# Patient Record
Sex: FEMALE | Race: WHITE | NOT HISPANIC OR LATINO | URBAN - METROPOLITAN AREA
[De-identification: names, ages, dates, MRNs, and addresses within clinical notes are randomized per-mention and may not be internally consistent; named-entity substitution may affect disease eponyms.]

---

## 2020-11-25 PROCEDURE — 86787 VARICELLA-ZOSTER ANTIBODY: CPT | Performed by: INTERNAL MEDICINE

## 2020-11-25 PROCEDURE — 86706 HEP B SURFACE ANTIBODY: CPT | Performed by: INTERNAL MEDICINE

## 2020-11-25 PROCEDURE — 86480 TB TEST CELL IMMUN MEASURE: CPT | Performed by: INTERNAL MEDICINE

## 2021-01-05 ENCOUNTER — IMMUNIZATION (OUTPATIENT)
Dept: IMMUNIZATION | Facility: CLINIC | Age: 31
End: 2021-01-05

## 2021-02-02 ENCOUNTER — IMMUNIZATION (OUTPATIENT)
Dept: IMMUNIZATION | Facility: CLINIC | Age: 31
End: 2021-02-02

## 2024-01-10 ENCOUNTER — APPOINTMENT (EMERGENCY)
Dept: RADIOLOGY | Facility: HOSPITAL | Age: 34
End: 2024-01-10
Attending: EMERGENCY MEDICINE
Payer: COMMERCIAL

## 2024-01-10 ENCOUNTER — HOSPITAL ENCOUNTER (OUTPATIENT)
Facility: HOSPITAL | Age: 34
Setting detail: OBSERVATION
Discharge: HOME | End: 2024-01-12
Attending: EMERGENCY MEDICINE | Admitting: COLON & RECTAL SURGERY
Payer: COMMERCIAL

## 2024-01-10 DIAGNOSIS — K81.9 ACALCULOUS CHOLECYSTITIS: Primary | ICD-10-CM

## 2024-01-10 DIAGNOSIS — K80.00 CHOLELITHIASIS AND ACUTE CHOLECYSTITIS WITHOUT OBSTRUCTION: ICD-10-CM

## 2024-01-10 LAB
ALBUMIN SERPL-MCNC: 3.6 G/DL (ref 3.5–5.7)
ALP SERPL-CCNC: 104 IU/L (ref 34–125)
ALT SERPL-CCNC: 94 IU/L (ref 7–52)
ANION GAP SERPL CALC-SCNC: 7 MEQ/L (ref 3–15)
AST SERPL-CCNC: 84 IU/L (ref 13–39)
B-HCG UR QL: NEGATIVE
BACTERIA URNS QL MICRO: ABNORMAL /HPF
BASOPHILS # BLD: 0.15 K/UL (ref 0.01–0.1)
BASOPHILS NFR BLD: 1.1 %
BILIRUB SERPL-MCNC: 0.2 MG/DL (ref 0.3–1.2)
BILIRUB UR QL STRIP.AUTO: NEGATIVE MG/DL
BUN SERPL-MCNC: 14 MG/DL (ref 7–25)
CALCIUM SERPL-MCNC: 8.6 MG/DL (ref 8.6–10.3)
CHLORIDE SERPL-SCNC: 109 MEQ/L (ref 98–107)
CLARITY UR REFRACT.AUTO: CLEAR
CO2 SERPL-SCNC: 26 MEQ/L (ref 21–31)
COLOR UR AUTO: COLORLESS
CREAT SERPL-MCNC: 0.6 MG/DL (ref 0.6–1.2)
DIFFERENTIAL METHOD BLD: ABNORMAL
EGFRCR SERPLBLD CKD-EPI 2021: >60 ML/MIN/1.73M*2
EOSINOPHIL # BLD: 0.38 K/UL (ref 0.04–0.36)
EOSINOPHIL NFR BLD: 2.8 %
ERYTHROCYTE [DISTWIDTH] IN BLOOD BY AUTOMATED COUNT: 13.6 % (ref 11.7–14.4)
GLUCOSE SERPL-MCNC: 82 MG/DL (ref 70–99)
GLUCOSE UR STRIP.AUTO-MCNC: NEGATIVE MG/DL
HCG UR QL: NEGATIVE
HCT VFR BLDCO AUTO: 34.5 % (ref 35–45)
HGB BLD-MCNC: 11.1 G/DL (ref 11.8–15.7)
HGB UR QL STRIP.AUTO: NEGATIVE
HYALINE CASTS #/AREA URNS LPF: ABNORMAL /LPF
IMM GRANULOCYTES # BLD AUTO: 0.03 K/UL (ref 0–0.08)
IMM GRANULOCYTES NFR BLD AUTO: 0.2 %
KETONES UR STRIP.AUTO-MCNC: NEGATIVE MG/DL
LEUKOCYTE ESTERASE UR QL STRIP.AUTO: 1
LIPASE SERPL-CCNC: 27 U/L (ref 11–82)
LYMPHOCYTES # BLD: 3.58 K/UL (ref 1.2–3.5)
LYMPHOCYTES NFR BLD: 26.7 %
MCH RBC QN AUTO: 30.2 PG (ref 28–33.2)
MCHC RBC AUTO-ENTMCNC: 32.2 G/DL (ref 32.2–35.5)
MCV RBC AUTO: 93.8 FL (ref 83–98)
MONOCYTES # BLD: 1.54 K/UL (ref 0.28–0.8)
MONOCYTES NFR BLD: 11.5 %
MUCOUS THREADS URNS QL MICRO: ABNORMAL /LPF
NEUTROPHILS # BLD: 7.72 K/UL (ref 1.7–7)
NEUTS SEG NFR BLD: 57.7 %
NITRITE UR QL STRIP.AUTO: NEGATIVE
NRBC BLD-RTO: 0 %
PDW BLD AUTO: 10.3 FL (ref 9.4–12.3)
PH UR STRIP.AUTO: 6 [PH]
PLATELET # BLD AUTO: 448 K/UL (ref 150–369)
POCT TEST: NORMAL
POTASSIUM SERPL-SCNC: 3.9 MEQ/L (ref 3.5–5.1)
PROT SERPL-MCNC: 6.5 G/DL (ref 6–8.2)
PROT UR QL STRIP.AUTO: NEGATIVE
RBC # BLD AUTO: 3.68 M/UL (ref 3.93–5.22)
RBC #/AREA URNS HPF: ABNORMAL /HPF
SODIUM SERPL-SCNC: 142 MEQ/L (ref 136–145)
SP GR UR REFRACT.AUTO: 1.01
SQUAMOUS URNS QL MICRO: ABNORMAL /HPF
UROBILINOGEN UR STRIP-ACNC: 0.2 EU/DL
WBC # BLD AUTO: 13.4 K/UL (ref 3.8–10.5)
WBC #/AREA URNS HPF: ABNORMAL /HPF

## 2024-01-10 PROCEDURE — 99285 EMERGENCY DEPT VISIT HI MDM: CPT | Mod: 25

## 2024-01-10 PROCEDURE — 96376 TX/PRO/DX INJ SAME DRUG ADON: CPT | Mod: 59

## 2024-01-10 PROCEDURE — 63600000 HC DRUGS/DETAIL CODE: Performed by: EMERGENCY MEDICINE

## 2024-01-10 PROCEDURE — 3E0337Z INTRODUCTION OF ELECTROLYTIC AND WATER BALANCE SUBSTANCE INTO PERIPHERAL VEIN, PERCUTANEOUS APPROACH: ICD-10-PCS | Performed by: EMERGENCY MEDICINE

## 2024-01-10 PROCEDURE — 81001 URINALYSIS AUTO W/SCOPE: CPT | Performed by: EMERGENCY MEDICINE

## 2024-01-10 PROCEDURE — 96374 THER/PROPH/DIAG INJ IV PUSH: CPT | Mod: 59

## 2024-01-10 PROCEDURE — 3E033GC INTRODUCTION OF OTHER THERAPEUTIC SUBSTANCE INTO PERIPHERAL VEIN, PERCUTANEOUS APPROACH: ICD-10-PCS | Performed by: EMERGENCY MEDICINE

## 2024-01-10 PROCEDURE — 80053 COMPREHEN METABOLIC PANEL: CPT | Performed by: EMERGENCY MEDICINE

## 2024-01-10 PROCEDURE — 25000000 HC PHARMACY GENERAL: Performed by: EMERGENCY MEDICINE

## 2024-01-10 PROCEDURE — 96367 TX/PROPH/DG ADDL SEQ IV INF: CPT | Mod: 59

## 2024-01-10 PROCEDURE — 83690 ASSAY OF LIPASE: CPT | Performed by: EMERGENCY MEDICINE

## 2024-01-10 PROCEDURE — 74177 CT ABD & PELVIS W/CONTRAST: CPT | Mod: MG

## 2024-01-10 PROCEDURE — 25500000 HC DRUGS/INCIDENT RAD: Performed by: EMERGENCY MEDICINE

## 2024-01-10 PROCEDURE — 85025 COMPLETE CBC W/AUTO DIFF WBC: CPT | Performed by: EMERGENCY MEDICINE

## 2024-01-10 PROCEDURE — 3E033NZ INTRODUCTION OF ANALGESICS, HYPNOTICS, SEDATIVES INTO PERIPHERAL VEIN, PERCUTANEOUS APPROACH: ICD-10-PCS | Performed by: EMERGENCY MEDICINE

## 2024-01-10 PROCEDURE — 96361 HYDRATE IV INFUSION ADD-ON: CPT | Mod: 59

## 2024-01-10 PROCEDURE — 76705 ECHO EXAM OF ABDOMEN: CPT

## 2024-01-10 PROCEDURE — 63600000 HC DRUGS/DETAIL CODE: Performed by: PHYSICIAN ASSISTANT

## 2024-01-10 PROCEDURE — 63600105 HC IODINE BASED CONTRAST: Mod: JZ | Performed by: EMERGENCY MEDICINE

## 2024-01-10 PROCEDURE — 84703 CHORIONIC GONADOTROPIN ASSAY: CPT | Performed by: EMERGENCY MEDICINE

## 2024-01-10 PROCEDURE — 36415 COLL VENOUS BLD VENIPUNCTURE: CPT | Performed by: EMERGENCY MEDICINE

## 2024-01-10 PROCEDURE — 3E03329 INTRODUCTION OF OTHER ANTI-INFECTIVE INTO PERIPHERAL VEIN, PERCUTANEOUS APPROACH: ICD-10-PCS | Performed by: EMERGENCY MEDICINE

## 2024-01-10 PROCEDURE — 96375 TX/PRO/DX INJ NEW DRUG ADDON: CPT | Mod: 59

## 2024-01-10 PROCEDURE — 96366 THER/PROPH/DIAG IV INF ADDON: CPT | Mod: 59

## 2024-01-10 PROCEDURE — A9698 NON-RAD CONTRAST MATERIALNOC: HCPCS | Performed by: EMERGENCY MEDICINE

## 2024-01-10 PROCEDURE — 3E0333Z INTRODUCTION OF ANTI-INFLAMMATORY INTO PERIPHERAL VEIN, PERCUTANEOUS APPROACH: ICD-10-PCS | Performed by: EMERGENCY MEDICINE

## 2024-01-10 PROCEDURE — 96365 THER/PROPH/DIAG IV INF INIT: CPT | Mod: 59

## 2024-01-10 RX ORDER — MORPHINE SULFATE 2 MG/ML
2 INJECTION, SOLUTION INTRAMUSCULAR; INTRAVENOUS ONCE
Status: COMPLETED | OUTPATIENT
Start: 2024-01-10 | End: 2024-01-10

## 2024-01-10 RX ORDER — FAMOTIDINE 10 MG/ML
20 INJECTION INTRAVENOUS ONCE
Status: COMPLETED | OUTPATIENT
Start: 2024-01-10 | End: 2024-01-10

## 2024-01-10 RX ORDER — KETOROLAC TROMETHAMINE 15 MG/ML
15 INJECTION, SOLUTION INTRAMUSCULAR; INTRAVENOUS ONCE
Status: COMPLETED | OUTPATIENT
Start: 2024-01-10 | End: 2024-01-10

## 2024-01-10 RX ORDER — IOPAMIDOL 755 MG/ML
100 INJECTION, SOLUTION INTRAVASCULAR
Status: COMPLETED | OUTPATIENT
Start: 2024-01-10 | End: 2024-01-10

## 2024-01-10 RX ADMIN — KETOROLAC TROMETHAMINE 15 MG: 15 INJECTION, SOLUTION INTRAMUSCULAR; INTRAVENOUS at 20:59

## 2024-01-10 RX ADMIN — IOPAMIDOL 100 ML: 755 INJECTION, SOLUTION INTRAVENOUS at 23:22

## 2024-01-10 RX ADMIN — FAMOTIDINE 20 MG: 10 INJECTION, SOLUTION INTRAVENOUS at 18:51

## 2024-01-10 RX ADMIN — MORPHINE SULFATE 2 MG: 2 INJECTION, SOLUTION INTRAMUSCULAR; INTRAVENOUS at 22:34

## 2024-01-10 RX ADMIN — IOHEXOL 1000 ML: 9 SOLUTION ORAL at 22:15

## 2024-01-10 NOTE — ED PROVIDER NOTES
"Emergency Medicine Note  HPI   HISTORY OF PRESENT ILLNESS     The patient complains of epigastric pain which started gradually last night.  It is a sharp pain that radiates to her back.  It is associated with nausea.  No vomiting.  She had a small bowel movement yesterday and again this morning.  After that, she had some diarrhea.  She went to urgent care and they \"did not do anything\".  She still feels like there is something wrong, however.  No fevers or chills.  She does not drink alcohol.  She has never had this pain before.  No history of peptic ulcers or gastritis.            Patient History   PAST HISTORY     Reviewed from Nursing Triage:  Meds       No past medical history on file.    No past surgical history on file.    No family history on file.           Review of Systems   REVIEW OF SYSTEMS     Review of Systems      VITALS     ED Vitals    Date/Time Temp Pulse Resp BP SpO2 [facility]   01/11/24 1210 -- -- -- 110/61 -- [facility]   01/11/24 1145 36.9 °C (98.5 °F) 76 14 131/67 98 % [facility]   01/11/24 0800 37.1 °C (98.7 °F) 77 22 125/76 98 % [facility]   01/11/24 0633 -- 73 -- 120/73 98 % BD   01/11/24 0345 -- 50 -- 113/71 97 % BD   01/11/24 0044 -- 50 -- 137/76 98 % [facility]   01/10/24 2345 -- 56 -- 121/70 99 % [facility]   01/10/24 2237 -- 61 -- -- 98 % [facility]   01/10/24 1900 -- 66 15 129/77 99 % [facility]   01/10/24 1742 -- 62 16 118/76 99 % AMP   01/10/24 1718 36.8 °C (98.3 °F) 62 16 126/74 100 % NN        Pulse Ox %: 98 % (01/10/24 2352)  Pulse Ox Interpretation: Normal (01/10/24 2352)  Heart Rate: 61 (01/10/24 2352)        Physical Exam   PHYSICAL EXAM     Physical Exam  Vitals and nursing note reviewed.   Constitutional:       Appearance: She is well-developed.   HENT:      Head: Normocephalic and atraumatic.   Eyes:      Conjunctiva/sclera: Conjunctivae normal.   Neck:      Vascular: No JVD.   Cardiovascular:      Rate and Rhythm: Normal rate and regular rhythm.   Pulmonary:      Effort: Pulmonary effort is normal. No respiratory distress. "      Breath sounds: Normal breath sounds.   Abdominal:      General: Bowel sounds are normal.      Palpations: Abdomen is soft.      Tenderness: There is abdominal tenderness (Epigastric). There is no guarding or rebound.   Musculoskeletal:         General: No deformity.      Cervical back: Neck supple.   Skin:     General: Skin is warm and dry.   Neurological:      General: No focal deficit present.      Mental Status: She is alert and oriented to person, place, and time.      Cranial Nerves: No cranial nerve deficit.   Psychiatric:         Thought Content: Thought content normal.         Judgment: Judgment normal.           PROCEDURES     Procedures     DATA     Results     Procedure Component Value Units Date/Time    Urinalysis with Reflex Culture [[id number]]  (Abnormal) Collected: 01/10/24 1949    Specimen: Urine, Clean Catch Updated: 01/10/24 2022    Narrative:      The following orders were created for panel order Urinalysis with Reflex Culture.  Procedure                               Abnormality         Status                     ---------                               -----------         ------                     UA Reflex to Culture (Ma...[[id number]]  Abnormal            Final result               UA Microscopic[[id number]]               Abnormal            Final result                 Please view results for these tests on the individual orders.    UA Microscopic [[id number]]  (Abnormal) Collected: 01/10/24 1949    Specimen: Urine, Clean Catch Updated: 01/10/24 2022     RBC, Urine 0 TO 4 /HPF      WBC, Urine 0 TO 3 /HPF      Squamous Epithelial Rare /hpf      Hyaline Cast None Seen /lpf      Bacteria, Urine None Seen /HPF      Mucus Rare /LPF     UA Reflex to Culture (Macroscopic) [[id number]]  (Abnormal) Collected: 01/10/24 1949    Specimen: Urine, Clean Catch Updated: 01/10/24 2020     Color, Urine Colorless     Clarity, Urine Clear     Specific Gravity, Urine 1.010     pH, Urine 6.0     Leukocyte  Esterase +1     Nitrite, Urine Negative     Protein, Urine Negative     Glucose, Urine Negative mg/dL      Ketones, Urine Negative mg/dL      Urobilinogen, Urine 0.2 EU/dL      Bilirubin, Urine Negative mg/dL      Blood, Urine Negative     Comment: The sensitivity of the occult blood test is equivalent to approximately 4 intact RBC/HPF.       Comprehensive metabolic panel [[id number]]  (Abnormal) Collected: 01/10/24 1742    Specimen: Blood, Venous Updated: 01/10/24 1844     Sodium 142 mEQ/L      Potassium 3.9 mEQ/L      Comment: Results obtained on plasma. Plasma Potassium values may be up to 0.4 mEQ/L less than serum values. The differences may be greater for patients with high platelet or white cell counts.        Chloride 109 mEQ/L      CO2 26 mEQ/L      BUN 14 mg/dL      Creatinine 0.6 mg/dL      Glucose 82 mg/dL      Calcium 8.6 mg/dL      AST (SGOT) 84 IU/L      ALT (SGPT) 94 IU/L      Alkaline Phosphatase 104 IU/L      Total Protein 6.5 g/dL      Comment: Test performed on plasma which typically contains approximately 0.4 g/dL more protein than serum.        Albumin 3.6 g/dL      Bilirubin, Total 0.2 mg/dL      eGFR >60.0 mL/min/1.73m*2      Comment: Calculation based on the Chronic Kidney Disease Epidemiology Collaboration (CKD-EPI) equation refit without adjustment for race.        Anion Gap 7 mEQ/L     Lipase, if pain is above umbilicus [[id number]]  (Normal) Collected: 01/10/24 1742    Specimen: Blood, Venous Updated: 01/10/24 1844     Lipase 27 U/L     BhCG, Serum, Qual [[id number]]  (Normal) Collected: 01/10/24 1742    Specimen: Blood, Venous Updated: 01/10/24 1808     Preg Test, Serum Negative    CBC and differential [[id number]]  (Abnormal) Collected: 01/10/24 1742    Specimen: Blood, Venous Updated: 01/10/24 1757     WBC 13.40 K/uL      RBC 3.68 M/uL      Hemoglobin 11.1 g/dL      Hematocrit 34.5 %      MCV 93.8 fL      MCH 30.2 pg      MCHC 32.2 g/dL      RDW 13.6 %      Platelets 448 K/uL      MPV 10.3  fL      Differential Type Auto     nRBC 0.0 %      Immature Granulocytes 0.2 %      Neutrophils 57.7 %      Lymphocytes 26.7 %      Monocytes 11.5 %      Eosinophils 2.8 %      Basophils 1.1 %      Immature Granulocytes, Absolute 0.03 K/uL      Neutrophils, Absolute 7.72 K/uL      Lymphocytes, Absolute 3.58 K/uL      Monocytes, Absolute 1.54 K/uL      Eosinophils, Absolute 0.38 K/uL      Basophils, Absolute 0.15 K/uL           Imaging Results          CT ABDOMEN PELVIS WITH IV CONTRAST (Edited Result - FINAL)  Result time 01/11/24 09:36:14    Addendum (preliminary) 1 of 1    Vipul ERVIN spoke with Dr. Ladonna Sen at 8:58am on 1/11/2024.               Final result                 Impression:    IMPRESSION:    1.  Redemonstrated edematous thickening of gallbladder wall and small amount of  pericholecystic fluid. Findings are concordant with prior sonogram and  compatible with acute acalculous cholecystitis based on patient's symptoms.  However, consider reactive gallbladder changes secondary to primary hepatic  pathology in the absence of cholelithiasis.  2.  Mild hepatomegaly. Periportal edema is nonspecific but could be compatible  with acute hepatitis or cholangitis.  3.  Status post splenectomy.  4.  Trace abdominal ascites. Moderate pelvic ascites.  5.  Very small right pleural effusion with mild bibasilar atelectasis.    Consider obtaining abdominal MRI/MRCP with and without contrast for further  evaluation of liver, gallbladder and biliary tree, as clinically indicated.             Narrative:    CLINICAL HISTORY:  34-year-old female with sharp, epigastric abdominal pain  radiating to back with nausea since last night.    COMPARISON:  Ultrasound examination of gallbladder dated 1/10/2024.    TECHNIQUE:  Axial CT images of the abdomen and pelvis are obtained following the  uneventful administration of intravenous contrast. Oral contrast is  administered. Coronal and sagittal images are reformatted. One or more  dose  reduction techniques (e.g., automated exposure control, adjustment of the mA  and/or kV according to the patient size, use of iterative reconstruction  technique) are utilized for this examination.    CONTRAST:  100 mL Isovue-370 IV contrast    COMMENT:    Lung Bases:  Very small, right-sided pleural effusion and trace pleural fluid in  left hemithorax with mild, passive or dependent atelectasis in posterior lower  lobes.    Liver:  Mildly enlarged. Mild periportal edema.    Gallbladder and Biliary Tree:  Edematous thickening of gallbladder wall and  small amount of pericholecystic fluid are concordant with sonographic findings.    Spleen:  Surgically absent.    Pancreas:  No suspicious findings.    Adrenal Glands:  No suspicious findings.    Kidneys:  Mild, right-sided pelvocaliectasis and mild right hydroureter, without  obstructive calculus identified. Small extrarenal pelves are noted bilaterally.    Pelvic Viscera:  No suspicious findings.    Bowel:  No bowel obstruction. Very small hiatal hernia. Portion of normal  caliber, gas containing appendix is seen in right lower quadrant.    Peritoneum, Retroperitoneum and Lymph Nodes:  No pneumoperitoneum. Moderate  volume of pelvic ascites. Very small volume of ascites in paracolic gutters.  Trace perihepatic ascites. No suspect mass or lymphadenopathy.    Osseous Structures:  No suspicious findings.    Additional Findings:  None.                    Preliminary Interpretation    Preliminary Impression:    Correlation was made with earlier ultrasound. Gallbladder wall edema and pericholecystic fluid concerning for acute cholecystitis. No calcified gallstones are seen. Status post splenectomy. No evidence for hydronephrosis. No evidence for bowel obstruction. Normal appendix. Moderate amount of stool in the colon. Moderate pelvic ascites. Bibasilar atelectasis. Small right pleural effusion.                                ULTRASOUND GALLBLADDER (Final result)   Result time 01/10/24 19:44:52    Final result                 Impression:    IMPRESSION:  Gallbladder wall thickening and pericholecystic fluid, concerning for acute  cholecystitis.               Narrative:      CLINICAL HISTORY:    Epigastric abdominal pain.    COMPARISON:   None    COMMENT:  Technique: Realtime grayscale and Color Doppler ultrasonography of the right  upper quadrant of the abdomen was performed.    Liver:              Normal in size, measuring 16.3 cm. There is normal liver  echogenicity. There is no mass. There is no intrahepatic or extrahepatic bile  duct dilatation. The common bile duct measures 2 mm, which is within normal  limits.  Gallbladder: There is gallbladder wall thickening, which measures up to 5 mm.  Additionally, there is a trace amount of pericholecystic fluid.  Pancreas:     The body appears normal. The head and tail are not well  visualized.  Right kidney: The right kidney measures 12.3 x 4.6 x 4.2 cm. There is normal  echogenicity. There is no hydronephrosis, calculus or mass.  Visualized upper abdominal aorta/IVC: Unremarkable                                ECG 12 lead          Scoring tools                                  ED Course & MDM   MDM / ED COURSE / CLINICAL IMPRESSION / DISPO     [facility]    ED Course as of 01/12/24 1004   Wed Ezra 10, 2024   2012 I spoke to the patient and her  about her results and plan of care.  I spoke to the surgery resident who will come to the ED to evaluate her. [HS]   3168 Preliminary Impression:    Correlation was made with earlier ultrasound. Gallbladder wall edema and pericholecystic fluid concerning for acute cholecystitis. No calcified gallstones are seen. Status post splenectomy. No evidence for hydronephrosis. No evidence for bowel obstruction. Normal appendix. Moderate amount of stool in the colon. Moderate pelvic ascites. Bibasilar atelectasis. Small right pleural effusion.    [KM]   3778 Paged surgery with CT results [KM]      ED  Course User Index  [HS] Cal Galan (Ed) MD Alexsander  [KM] Deneen Yo PA C     Clinical Impression      Acalculous cholecystitis     _________________     ED Disposition   Admit / Observation                   Cal Galan (Ed) MD Alexsander  01/12/24 1008

## 2024-01-11 ENCOUNTER — ANESTHESIA EVENT (INPATIENT)
Dept: OPERATING ROOM | Facility: HOSPITAL | Age: 34
End: 2024-01-11
Payer: COMMERCIAL

## 2024-01-11 ENCOUNTER — APPOINTMENT (INPATIENT)
Dept: RADIOLOGY | Facility: HOSPITAL | Age: 34
End: 2024-01-11
Attending: STUDENT IN AN ORGANIZED HEALTH CARE EDUCATION/TRAINING PROGRAM
Payer: COMMERCIAL

## 2024-01-11 PROBLEM — K80.00 CHOLELITHIASIS AND ACUTE CHOLECYSTITIS WITHOUT OBSTRUCTION: Status: ACTIVE | Noted: 2024-01-11

## 2024-01-11 LAB
ALBUMIN SERPL-MCNC: 3.3 G/DL (ref 3.5–5.7)
ALP SERPL-CCNC: 107 IU/L (ref 34–125)
ALT SERPL-CCNC: 127 IU/L (ref 7–52)
ANION GAP SERPL CALC-SCNC: 7 MEQ/L (ref 3–15)
AST SERPL-CCNC: 124 IU/L (ref 13–39)
BILIRUB DIRECT SERPL-MCNC: 0.1 MG/DL
BILIRUB SERPL-MCNC: 0.2 MG/DL (ref 0.3–1.2)
BUN SERPL-MCNC: 11 MG/DL (ref 7–25)
CALCIUM SERPL-MCNC: 8.3 MG/DL (ref 8.6–10.3)
CHLORIDE SERPL-SCNC: 107 MEQ/L (ref 98–107)
CO2 SERPL-SCNC: 25 MEQ/L (ref 21–31)
CREAT SERPL-MCNC: 0.7 MG/DL (ref 0.6–1.2)
EGFRCR SERPLBLD CKD-EPI 2021: >60 ML/MIN/1.73M*2
ERYTHROCYTE [DISTWIDTH] IN BLOOD BY AUTOMATED COUNT: 13.7 % (ref 11.7–14.4)
GLUCOSE SERPL-MCNC: 111 MG/DL (ref 70–99)
HCT VFR BLDCO AUTO: 31.2 % (ref 35–45)
HGB BLD-MCNC: 10.1 G/DL (ref 11.8–15.7)
MAGNESIUM SERPL-MCNC: 1.8 MG/DL (ref 1.8–2.5)
MCH RBC QN AUTO: 30.1 PG (ref 28–33.2)
MCHC RBC AUTO-ENTMCNC: 32.4 G/DL (ref 32.2–35.5)
MCV RBC AUTO: 93.1 FL (ref 83–98)
PDW BLD AUTO: 11.4 FL (ref 9.4–12.3)
PHOSPHATE SERPL-MCNC: 4.3 MG/DL (ref 2.4–4.7)
PLATELET # BLD AUTO: 386 K/UL (ref 150–369)
POTASSIUM SERPL-SCNC: 3.4 MEQ/L (ref 3.5–5.1)
PROT SERPL-MCNC: 5.8 G/DL (ref 6–8.2)
RBC # BLD AUTO: 3.35 M/UL (ref 3.93–5.22)
SODIUM SERPL-SCNC: 139 MEQ/L (ref 136–145)
WBC # BLD AUTO: 13.63 K/UL (ref 3.8–10.5)

## 2024-01-11 PROCEDURE — 63700000 HC SELF-ADMINISTRABLE DRUG

## 2024-01-11 PROCEDURE — 63700000 HC SELF-ADMINISTRABLE DRUG: Performed by: STUDENT IN AN ORGANIZED HEALTH CARE EDUCATION/TRAINING PROGRAM

## 2024-01-11 PROCEDURE — 25800000 HC PHARMACY IV SOLUTIONS: Performed by: STUDENT IN AN ORGANIZED HEALTH CARE EDUCATION/TRAINING PROGRAM

## 2024-01-11 PROCEDURE — 93005 ELECTROCARDIOGRAM TRACING: CPT

## 2024-01-11 PROCEDURE — 21400000 HC ROOM AND CARE CCU/INTERMEDIATE

## 2024-01-11 PROCEDURE — 83735 ASSAY OF MAGNESIUM: CPT

## 2024-01-11 PROCEDURE — 80048 BASIC METABOLIC PNL TOTAL CA: CPT

## 2024-01-11 PROCEDURE — 63600000 HC DRUGS/DETAIL CODE: Mod: JZ | Performed by: STUDENT IN AN ORGANIZED HEALTH CARE EDUCATION/TRAINING PROGRAM

## 2024-01-11 PROCEDURE — 36415 COLL VENOUS BLD VENIPUNCTURE: CPT

## 2024-01-11 PROCEDURE — 25000000 HC PHARMACY GENERAL

## 2024-01-11 PROCEDURE — G0378 HOSPITAL OBSERVATION PER HR: HCPCS

## 2024-01-11 PROCEDURE — 80076 HEPATIC FUNCTION PANEL: CPT

## 2024-01-11 PROCEDURE — 25000000 HC PHARMACY GENERAL: Performed by: STUDENT IN AN ORGANIZED HEALTH CARE EDUCATION/TRAINING PROGRAM

## 2024-01-11 PROCEDURE — 85027 COMPLETE CBC AUTOMATED: CPT

## 2024-01-11 PROCEDURE — 99223 1ST HOSP IP/OBS HIGH 75: CPT | Performed by: COLON & RECTAL SURGERY

## 2024-01-11 PROCEDURE — 71045 X-RAY EXAM CHEST 1 VIEW: CPT

## 2024-01-11 PROCEDURE — 63600000 HC DRUGS/DETAIL CODE: Mod: JZ,JG

## 2024-01-11 PROCEDURE — 84100 ASSAY OF PHOSPHORUS: CPT

## 2024-01-11 PROCEDURE — 63600000 HC DRUGS/DETAIL CODE: Performed by: PHARMACIST

## 2024-01-11 PROCEDURE — 25800000 HC PHARMACY IV SOLUTIONS

## 2024-01-11 RX ORDER — DEXTROSE MONOHYDRATE, SODIUM CHLORIDE, AND POTASSIUM CHLORIDE 50; 1.49; 4.5 G/1000ML; G/1000ML; G/1000ML
INJECTION, SOLUTION INTRAVENOUS CONTINUOUS
Status: DISCONTINUED | OUTPATIENT
Start: 2024-01-11 | End: 2024-01-12

## 2024-01-11 RX ORDER — SUMATRIPTAN 6 MG/.5ML
6 INJECTION, SOLUTION SUBCUTANEOUS ONCE
Status: COMPLETED | OUTPATIENT
Start: 2024-01-11 | End: 2024-01-11

## 2024-01-11 RX ORDER — DOCUSATE SODIUM 100 MG/1
100 CAPSULE, LIQUID FILLED ORAL 2 TIMES DAILY PRN
Status: DISCONTINUED | OUTPATIENT
Start: 2024-01-11 | End: 2024-01-12 | Stop reason: HOSPADM

## 2024-01-11 RX ORDER — DIPHENHYDRAMINE HCL 50 MG/ML
50 VIAL (ML) INJECTION ONCE
Status: COMPLETED | OUTPATIENT
Start: 2024-01-11 | End: 2024-01-11

## 2024-01-11 RX ORDER — IBUPROFEN 200 MG
16-32 TABLET ORAL AS NEEDED
Status: DISCONTINUED | OUTPATIENT
Start: 2024-01-11 | End: 2024-01-12 | Stop reason: HOSPADM

## 2024-01-11 RX ORDER — CYANOCOBALAMIN (VITAMIN B-12) 500 MCG
1 TABLET ORAL NIGHTLY
Status: COMPLETED | OUTPATIENT
Start: 2024-01-11 | End: 2024-01-11

## 2024-01-11 RX ORDER — OXYCODONE HYDROCHLORIDE 5 MG/1
5 TABLET ORAL EVERY 6 HOURS PRN
Status: DISCONTINUED | OUTPATIENT
Start: 2024-01-11 | End: 2024-01-12 | Stop reason: HOSPADM

## 2024-01-11 RX ORDER — METOCLOPRAMIDE HYDROCHLORIDE 5 MG/ML
5 INJECTION INTRAMUSCULAR; INTRAVENOUS EVERY 6 HOURS PRN
Status: DISCONTINUED | OUTPATIENT
Start: 2024-01-11 | End: 2024-01-11

## 2024-01-11 RX ORDER — ONDANSETRON HYDROCHLORIDE 2 MG/ML
4 INJECTION, SOLUTION INTRAVENOUS EVERY 6 HOURS PRN
Status: DISCONTINUED | OUTPATIENT
Start: 2024-01-11 | End: 2024-01-11

## 2024-01-11 RX ORDER — KETOROLAC TROMETHAMINE 15 MG/ML
15 INJECTION, SOLUTION INTRAMUSCULAR; INTRAVENOUS EVERY 6 HOURS PRN
Status: DISCONTINUED | OUTPATIENT
Start: 2024-01-11 | End: 2024-01-12 | Stop reason: HOSPADM

## 2024-01-11 RX ORDER — LANOLIN ALCOHOL/MO/W.PET/CERES
400 CREAM (GRAM) TOPICAL ONCE
Status: DISCONTINUED | OUTPATIENT
Start: 2024-01-11 | End: 2024-01-11

## 2024-01-11 RX ORDER — SENNOSIDES 8.6 MG/1
1 TABLET ORAL NIGHTLY
Status: DISCONTINUED | OUTPATIENT
Start: 2024-01-11 | End: 2024-01-12 | Stop reason: HOSPADM

## 2024-01-11 RX ORDER — ACETAMINOPHEN 325 MG/1
975 TABLET ORAL EVERY 6 HOURS PRN
Status: DISCONTINUED | OUTPATIENT
Start: 2024-01-11 | End: 2024-01-12 | Stop reason: HOSPADM

## 2024-01-11 RX ORDER — DEXTROSE 50 % IN WATER (D50W) INTRAVENOUS SYRINGE
25 AS NEEDED
Status: DISCONTINUED | OUTPATIENT
Start: 2024-01-11 | End: 2024-01-12 | Stop reason: HOSPADM

## 2024-01-11 RX ORDER — DEXTROSE 40 %
15-30 GEL (GRAM) ORAL AS NEEDED
Status: DISCONTINUED | OUTPATIENT
Start: 2024-01-11 | End: 2024-01-12 | Stop reason: HOSPADM

## 2024-01-11 RX ORDER — HEPARIN SODIUM 5000 [USP'U]/ML
5000 INJECTION, SOLUTION INTRAVENOUS; SUBCUTANEOUS EVERY 8 HOURS
Status: DISCONTINUED | OUTPATIENT
Start: 2024-01-12 | End: 2024-01-11

## 2024-01-11 RX ORDER — POTASSIUM CHLORIDE 14.9 MG/ML
20 INJECTION INTRAVENOUS ONCE
Status: DISCONTINUED | OUTPATIENT
Start: 2024-01-11 | End: 2024-01-11

## 2024-01-11 RX ORDER — CLONAZEPAM 0.5 MG/1
0.5 TABLET ORAL DAILY PRN
COMMUNITY

## 2024-01-11 RX ORDER — KETOROLAC TROMETHAMINE 30 MG/ML
30 INJECTION, SOLUTION INTRAMUSCULAR; INTRAVENOUS ONCE
Status: COMPLETED | OUTPATIENT
Start: 2024-01-11 | End: 2024-01-11

## 2024-01-11 RX ORDER — IBUPROFEN 400 MG/1
800 TABLET ORAL EVERY 6 HOURS PRN
Status: DISCONTINUED | OUTPATIENT
Start: 2024-01-11 | End: 2024-01-11

## 2024-01-11 RX ORDER — GUAIFENESIN 600 MG/1
600 TABLET, EXTENDED RELEASE ORAL 2 TIMES DAILY
Status: DISCONTINUED | OUTPATIENT
Start: 2024-01-11 | End: 2024-01-12

## 2024-01-11 RX ORDER — DEXTROSE MONOHYDRATE, SODIUM CHLORIDE, AND POTASSIUM CHLORIDE 50; 1.49; 4.5 G/1000ML; G/1000ML; G/1000ML
INJECTION, SOLUTION INTRAVENOUS CONTINUOUS
Status: DISCONTINUED | OUTPATIENT
Start: 2024-01-11 | End: 2024-01-11

## 2024-01-11 RX ORDER — HEPARIN SODIUM 5000 [USP'U]/ML
5000 INJECTION, SOLUTION INTRAVENOUS; SUBCUTANEOUS EVERY 8 HOURS
Status: DISCONTINUED | OUTPATIENT
Start: 2024-01-12 | End: 2024-01-12

## 2024-01-11 RX ORDER — MORPHINE SULFATE 2 MG/ML
2 INJECTION, SOLUTION INTRAMUSCULAR; INTRAVENOUS ONCE
Status: DISCONTINUED | OUTPATIENT
Start: 2024-01-11 | End: 2024-01-11

## 2024-01-11 RX ORDER — PANTOPRAZOLE SODIUM 40 MG/10ML
40 INJECTION, POWDER, LYOPHILIZED, FOR SOLUTION INTRAVENOUS EVERY 24 HOURS
Status: DISCONTINUED | OUTPATIENT
Start: 2024-01-11 | End: 2024-01-12 | Stop reason: HOSPADM

## 2024-01-11 RX ORDER — METRONIDAZOLE 500 MG/100ML
500 INJECTION, SOLUTION INTRAVENOUS
Status: DISCONTINUED | OUTPATIENT
Start: 2024-01-11 | End: 2024-01-12

## 2024-01-11 RX ORDER — HYDROMORPHONE HYDROCHLORIDE 1 MG/ML
0.5 INJECTION, SOLUTION INTRAMUSCULAR; INTRAVENOUS; SUBCUTANEOUS EVERY 4 HOURS PRN
Status: DISCONTINUED | OUTPATIENT
Start: 2024-01-11 | End: 2024-01-11

## 2024-01-11 RX ORDER — CLONAZEPAM 0.5 MG/1
0.5 TABLET ORAL NIGHTLY PRN
Status: DISCONTINUED | OUTPATIENT
Start: 2024-01-11 | End: 2024-01-12 | Stop reason: HOSPADM

## 2024-01-11 RX ADMIN — POTASSIUM CHLORIDE, DEXTROSE MONOHYDRATE AND SODIUM CHLORIDE: 150; 5; 450 INJECTION, SOLUTION INTRAVENOUS at 18:04

## 2024-01-11 RX ADMIN — METRONIDAZOLE 500 MG: 5 INJECTION, SOLUTION INTRAVENOUS at 08:20

## 2024-01-11 RX ADMIN — POTASSIUM CHLORIDE, DEXTROSE MONOHYDRATE AND SODIUM CHLORIDE: 150; 5; 450 INJECTION, SOLUTION INTRAVENOUS at 10:04

## 2024-01-11 RX ADMIN — CLONAZEPAM 0.5 MG: 0.5 TABLET ORAL at 20:45

## 2024-01-11 RX ADMIN — METRONIDAZOLE 500 MG: 5 INJECTION, SOLUTION INTRAVENOUS at 17:07

## 2024-01-11 RX ADMIN — OXYCODONE HYDROCHLORIDE 5 MG: 5 TABLET ORAL at 08:17

## 2024-01-11 RX ADMIN — HYDROMORPHONE HYDROCHLORIDE 0.5 MG: 1 INJECTION, SOLUTION INTRAMUSCULAR; INTRAVENOUS; SUBCUTANEOUS at 10:04

## 2024-01-11 RX ADMIN — POTASSIUM CHLORIDE, DEXTROSE MONOHYDRATE AND SODIUM CHLORIDE: 150; 5; 450 INJECTION, SOLUTION INTRAVENOUS at 03:43

## 2024-01-11 RX ADMIN — DOCUSATE SODIUM 100 MG: 100 CAPSULE, LIQUID FILLED ORAL at 12:59

## 2024-01-11 RX ADMIN — HYDROMORPHONE HYDROCHLORIDE 0.5 MG: 1 INJECTION, SOLUTION INTRAMUSCULAR; INTRAVENOUS; SUBCUTANEOUS at 06:31

## 2024-01-11 RX ADMIN — PANTOPRAZOLE SODIUM 40 MG: 40 INJECTION, POWDER, LYOPHILIZED, FOR SOLUTION INTRAVENOUS at 08:17

## 2024-01-11 RX ADMIN — OXYCODONE HYDROCHLORIDE 5 MG: 5 TABLET ORAL at 20:45

## 2024-01-11 RX ADMIN — KETOROLAC TROMETHAMINE 30 MG: 30 INJECTION, SOLUTION INTRAMUSCULAR at 16:10

## 2024-01-11 RX ADMIN — SENNOSIDES 1 TABLET: 8.6 TABLET, FILM COATED ORAL at 20:45

## 2024-01-11 RX ADMIN — Medication 1 MG: at 03:41

## 2024-01-11 RX ADMIN — ONDANSETRON 4 MG: 2 INJECTION INTRAMUSCULAR; INTRAVENOUS at 01:32

## 2024-01-11 RX ADMIN — MORPHINE SULFATE 2 MG: 2 INJECTION, SOLUTION INTRAMUSCULAR; INTRAVENOUS at 02:27

## 2024-01-11 RX ADMIN — METRONIDAZOLE 500 MG: 5 INJECTION, SOLUTION INTRAVENOUS at 01:34

## 2024-01-11 RX ADMIN — CEFTRIAXONE SODIUM 1 G: 1 INJECTION, POWDER, FOR SOLUTION INTRAMUSCULAR; INTRAVENOUS at 00:41

## 2024-01-11 RX ADMIN — SUMATRIPTAN 6 MG: 6 INJECTION, SOLUTION SUBCUTANEOUS at 16:10

## 2024-01-11 RX ADMIN — MAGNESIUM SULFATE HEPTAHYDRATE 2 G: 40 INJECTION, SOLUTION INTRAVENOUS at 14:20

## 2024-01-11 RX ADMIN — ONDANSETRON 4 MG: 2 INJECTION INTRAMUSCULAR; INTRAVENOUS at 11:35

## 2024-01-11 RX ADMIN — DIPHENHYDRAMINE HYDROCHLORIDE 50 MG: 50 INJECTION INTRAMUSCULAR; INTRAVENOUS at 14:09

## 2024-01-11 ASSESSMENT — COGNITIVE AND FUNCTIONAL STATUS - GENERAL
CLIMB 3 TO 5 STEPS WITH RAILING: 4 - NONE
STANDING UP FROM CHAIR USING ARMS: 4 - NONE
MOVING TO AND FROM BED TO CHAIR: 4 - NONE
MOVING TO AND FROM BED TO CHAIR: 4 - NONE
CLIMB 3 TO 5 STEPS WITH RAILING: 4 - NONE
STANDING UP FROM CHAIR USING ARMS: 4 - NONE
CLIMB 3 TO 5 STEPS WITH RAILING: 4 - NONE
STANDING UP FROM CHAIR USING ARMS: 4 - NONE
WALKING IN HOSPITAL ROOM: 4 - NONE
MOVING TO AND FROM BED TO CHAIR: 4 - NONE

## 2024-01-11 NOTE — PLAN OF CARE
Care Coordination Admission Assessment Note    General Information:  Readmission Within the last 30 days: no previous admission in last 30 days  Does patient have a :    Patient-Specific Goals (include timeframe): to have the surgery    Living Arrangements:  Arrived From: home  Current Living Arrangements: home  People in Home: spouse  Home Accessibility:    Living Arrangement Comments: lives w/ spouse in 2 , 5 [place]    Housing Stability and Utility Access (SDOH):  In the last 12 months, was there a time when you were not able to pay the mortgage or rent on time?: No  In the last 12 months, how many places have you lived?: 1  In the last 12 months, was there a time when you did not have a steady place to sleep or slept in a shelter (including now)?: No  In the past 12 months has the electric, gas, oil, or water company threatened to shut off services in your home?: No    Functional Status Prior to Admission:   Assistive Device/Animal Currently Used at Home: none  Functional Status Comments:    IADL Comments:       Supports and Services:  Current Outpatient/Agency/Support Group: none  Type of Current Home Care Services:    History of home care episode or rehab stay:      Discharge Needs Assessment:   Concerns to be Addressed: no discharge needs identified  Current Discharge Risk:    Anticipated Changes Related to Illness: none    Patient/Family Anticipated Discharge Plan:  Patient/Family Anticipates Transition To: home with family  Patient/Family Anticipated Services at Transition: none    Connection to Community  Not applicable      Patient Choice:   Offered/Gave Vendor List: no  Patient's Choice of Community Agency(s):         Anticipated Discharge Plan:     Anticipated Discharge Disposition: home without assistance or services     Transportation Needs (SDOH):  Transportation Concerns:    Transportation Anticipated: family or friend will provide  Is Out of Hospital DNR needed at discharge?:  no    In the past 12 months, has lack of transportation kept you from medical appointments or from getting medications?: No  In the past 12 months, has lack of transportation kept you from meetings, work, or from getting things needed for daily living?: No    Concerns - comments:  RNCC met w/ pt at bedside to complete CMA    Confirmed PCP, preferred pharmacy, address, emergency contacts    Vaccine status: rec'd Moderna covid vaccine plus 1 booster; did not get flu shot    LW/POA: none    Adm w/ acute cholecystitis  Plan : NPO/ IVF, for OR for lap brandi 1/12    No dc needs identified  CC will continue to follow until discharged

## 2024-01-11 NOTE — H&P
General Surgery History and Physical ([facility] [id number])         HPI     Patient is a 34 y.o. female, with previous medical history of ITP s/p laparoscopic splenectomy (2010), urinary stent, who presents with abdominal discomfort, distention.  Patient's  at bedside.  Patient reports that yesterday she started having discomfort in her upper abdomen.  Describes it as soreness, not as severe as pain.  She was also feeling that area.  Later on her soreness started radiating to her back.  Endorses nausea.  Denies vomiting, fever, chills.    In the ED she is afebrile hemodynamically stable  Labs are significant for leukocytosis white count 13.4, BMP within normal limits.  LFTs mildly elevated (AST 84, ALT 94, , T. bili 0.2, lipase 27).  Ultrasound showing gallbladder wall thickening 4.4-5mm, pericholecystic fluid. CBD is 1.8 mm  CT abdomen pelvis was done, showing gallbladder wall edema and pericholecystic fluid concerning for acute cholecystitis      Medical History: No past medical history on file.    Surgical History: No past surgical history on file.    Social History:   Social History     Socioeconomic History   • Marital status:      Social Determinants of Health     Food Insecurity: No Food Insecurity (1/10/2024)    Hunger Vital Sign    • Worried About Running Out of Food in the Last Year: Never true    • Ran Out of Food in the Last Year: Never true       Family History: No family history on file.    Allergies: Patient has no known allergies.    Home Medications:  Not in a hospital admission.    Current Medications:  •  acetaminophen, 975 mg, oral, q6h PRN  •  cefTRIAXone, 1 g, intravenous, q24h INT  •  glucose, 16-32 g of dextrose, oral, PRN **OR** dextrose, 15-30 g of dextrose, oral, PRN **OR** glucagon, 1 mg, intramuscular, PRN **OR** dextrose 50 % in water (D50), 25 mL, intravenous, PRN  •  potassium chloride-D5-0.45%NaCl, , intravenous, Continuous  •  [START ON 1/12/2024] heparin (porcine),  5,000 Units, subcutaneous, q8h NIKOLE  •  metroNIDAZOLE, 500 mg, intravenous, q8h INT  •  ondansetron, 4 mg, intravenous, q6h PRN  •  oxyCODONE, 5 mg, oral, q6h PRN  •  pantoprazole, 40 mg, intravenous, q24h    Review of Systems  Pertinent items are noted in HPI.    Objective     Vital Signs for the last 24 hours:  Temp:  [36.8 °C (98.3 °F)] 36.8 °C (98.3 °F)  Heart Rate:  [56-66] 56  Resp:  [15-16] 15  BP: (118-129)/(70-77) 121/70    Physicial Exam    General appearance: alert, appears stated age and cooperative  Head: normocephalic, without obvious abnormality, atraumatic  Eyes: PERRL, EOMI  Neck: no JVD and supple, symmetrical, trachea midline  Lungs: Non copious breathing, symmetrical chest expansion  Heart: RRR  Abdomen: Soft, diffusely tender, mostly epigastrium and right upper quadrant, upon palpation of lower abdomen patient endorses tenderness radiating to her right upper quadrant, no guarding, no rigidity, not peritonitic, negative Cisneros  Extremities: extremities normal, warm and well-perfused; no cyanosis, clubbing, or edema  Skin: Skin color, texture, turgor normal. No rashes or lesions  Neurologic: Grossly normal        Labs   Latest Reference Range & Units 01/10/24 17:42   WBC 3.80 - 10.50 K/uL 13.40 (H)   RBC 3.93 - 5.22 M/uL 3.68 (L)   Hemoglobin 11.8 - 15.7 g/dL 11.1 (L)   Hematocrit 35.0 - 45.0 % 34.5 (L)   MCV 83.0 - 98.0 fL 93.8   MCH 28.0 - 33.2 pg 30.2   MCHC 32.2 - 35.5 g/dL 32.2   RDW 11.7 - 14.4 % 13.6   Platelets 150 - 369 K/uL 448 (H)   MPV 9.4 - 12.3 fL 10.3   (H): Data is abnormally high  (L): Data is abnormally low     Latest Reference Range & Units 01/10/24 17:42   Sodium 136 - 145 mEQ/L 142   Potassium, Bld 3.5 - 5.1 mEQ/L 3.9   Chloride 98 - 107 mEQ/L 109 (H)   CO2 21 - 31 mEQ/L 26   BUN 7 - 25 mg/dL 14   Creatinine 0.6 - 1.2 mg/dL 0.6   Glucose 70 - 99 mg/dL 82   Calcium 8.6 - 10.3 mg/dL 8.6   AST (SGOT) 13 - 39 IU/L 84 (H)   ALT (SGPT) 7 - 52 IU/L 94 (H)   Alkaline Phosphatase 34 -  125 IU/L 104   Total Protein 6.0 - 8.2 g/dL 6.5   Albumin 3.5 - 5.7 g/dL 3.6   Bilirubin, Total 0.3 - 1.2 mg/dL 0.2 (L)   eGFR >=60.0 mL/min/1.73m*2 >60.0   Anion Gap 3 - 15 mEQ/L 7   Lipase 11 - 82 U/L 27   (H): Data is abnormally high  (L): Data is abnormally low    Imaging  U/s Gallbladder 1/10/2024    Narrative & Impression      CLINICAL HISTORY:    Epigastric abdominal pain.     COMPARISON:   None     COMMENT:  Technique: Realtime grayscale and Color Doppler ultrasonography of the right  upper quadrant of the abdomen was performed.     Liver:              Normal in size, measuring 16.3 cm. There is normal liver  echogenicity. There is no mass. There is no intrahepatic or extrahepatic bile  duct dilatation. The common bile duct measures 2 mm, which is within normal  limits.  Gallbladder: There is gallbladder wall thickening, which measures up to 5 mm.  Additionally, there is a trace amount of pericholecystic fluid.  Pancreas:     The body appears normal. The head and tail are not well  visualized.  Right kidney: The right kidney measures 12.3 x 4.6 x 4.2 cm. There is normal  echogenicity. There is no hydronephrosis, calculus or mass.  Visualized upper abdominal aorta/IVC: Unremarkable     --  IMPRESSION:  Gallbladder wall thickening and pericholecystic fluid, concerning for acute  cholecystitis.         CT A/P 1/10/2024    Deneen Yo PA C on 1/10/2024 11:50 PM ([facility], Emergency Medicine)   Preliminary Impression:     Correlation was made with earlier ultrasound. Gallbladder wall edema and pericholecystic fluid concerning for acute cholecystitis. No calcified gallstones are seen. Status post splenectomy. No evidence for hydronephrosis. No evidence for bowel obstruction. Normal appendix. Moderate amount of stool in the colon. Moderate pelvic ascites. Bibasilar atelectasis. Small right pleural effusion.            Assessment/Plan     34 y.o. F, with PMH of ITP s/p  laparoscopic splenectomy (2010), urinary stent, who presents with abdominal soreness and nausea. ,    Nontoxic-appearing, afebrile, hemodynamically stable. Labs significant for leukocytosis 13.4, BMP within normal limits.  LFTs mildly elevated (AST 84, ALT 94, , T. bili 0.2, lipase 27). Ultrasound showing gallbladder wall thickening 4.4-5mm, pericholecystic fluid. CBD is 1.8 mm, consistent with acute cholecystitis    -Admit to general surgery  -NPO/IVF  -Ceftriaxone, Flagyl  -Pain meds  -Plan for lap brandi on Friday 1/12    Code Status: Full Code    Discussed with Dr. Tu Wall MD  PGY-3 General Surgery Resident   Please page [phone] with any questions or concerns

## 2024-01-11 NOTE — PRE-PROCEDURE INSTRUCTIONS
Surgery Pre-operative Note    Pre-op diagnosis Pre-op Diagnosis     * Cholelithiasis and acute cholecystitis without obstruction [K80.00]    Procedure:  CHOLECYSTECTOMY LAPAROSCOPIC    Schedule:  1/11/24   Code status: Full Code   Labs Results from last 7 days   Lab Units 01/11/24  0357 01/10/24  1742   WBC K/uL 13.63* 13.40*   HEMOGLOBIN g/dL 10.1* 11.1*   HEMATOCRIT % 31.2* 34.5*   PLATELETS K/uL 386* 448*     Results from last 7 days   Lab Units 01/11/24  0357 01/10/24  1742   SODIUM mEQ/L 139 142   POTASSIUM mEQ/L 3.4* 3.9   CHLORIDE mEQ/L 107 109*   CO2 mEQ/L 25 26   BUN mg/dL 11 14   CREATININE mg/dL 0.7 0.6   GLUCOSE mg/dL 111* 82   CALCIUM mg/dL 8.3* 8.6   MAGNESIUM mg/dL 1.8  --    PHOSPHORUS mg/dL 4.3  --         Coagulation studies  Anticoagulation  Antiplatelets     PT/INR ordered    Blood Type and screen ordered   CXR/Imaging:  XR CHEST 1 VW 01/11/2024    Narrative  CLINICAL HISTORY: OR tomorrow.    AP portable view of the chest.    COMPARISON: CT abdomen/pelvis on 1024    COMMENT:    The heart is normal in size.  The cardiomediastinal silhouette is unremarkable.  The lungs are clear. The trace bilateral pleural effusions seen on the earlier  CT are not visible radiographically. The visualized soft tissue and osseous  structures are unremarkable.    Impression  IMPRESSION:    No active disease.     Diet NPO after midnight   Consent Obtained, in OR preop   Allergies NKDA   Periop abx Ancef on call to OR as needed      Please page [phone] with any questions or concerns.  LETICIA Arroyo

## 2024-01-11 NOTE — PROGRESS NOTES
Spoke with patient and confirmed with medication list from [facility] and/or patient's own pharmacy to complete the medication reconciliation.     Prior to admission medication list:  Current Outpatient Medications:   •  clonazePAM (klonoPIN) 0.5 mg tablet, Take 0.5 mg by mouth daily as needed for anxiety.    Comments about home medications:  -Patient reports she weaned herself off Prozac last week.    Compliance:   -Recent fills on clonazepam.

## 2024-01-12 ENCOUNTER — ANESTHESIA (INPATIENT)
Dept: OPERATING ROOM | Facility: HOSPITAL | Age: 34
End: 2024-01-12
Payer: COMMERCIAL

## 2024-01-12 VITALS
DIASTOLIC BLOOD PRESSURE: 69 MMHG | OXYGEN SATURATION: 99 % | RESPIRATION RATE: 18 BRPM | HEIGHT: 60 IN | BODY MASS INDEX: 23.36 KG/M2 | WEIGHT: 119 LBS | HEART RATE: 70 BPM | SYSTOLIC BLOOD PRESSURE: 115 MMHG | TEMPERATURE: 97.4 F

## 2024-01-12 LAB
ABO + RH BLD: NORMAL
ALBUMIN SERPL-MCNC: 3.1 G/DL (ref 3.5–5.7)
ALP SERPL-CCNC: 92 IU/L (ref 34–125)
ALT SERPL-CCNC: 88 IU/L (ref 7–52)
ANION GAP SERPL CALC-SCNC: 5 MEQ/L (ref 3–15)
APTT PPP: 24 SEC (ref 23–35)
AST SERPL-CCNC: 57 IU/L (ref 13–39)
ATRIAL RATE: 49
BILIRUB DIRECT SERPL-MCNC: 0.1 MG/DL
BILIRUB SERPL-MCNC: 0.2 MG/DL (ref 0.3–1.2)
BLD GP AB SCN SERPL QL: NEGATIVE
BUN SERPL-MCNC: 7 MG/DL (ref 7–25)
CALCIUM SERPL-MCNC: 8.1 MG/DL (ref 8.6–10.3)
CHLORIDE SERPL-SCNC: 110 MEQ/L (ref 98–107)
CO2 SERPL-SCNC: 24 MEQ/L (ref 21–31)
CREAT SERPL-MCNC: 0.6 MG/DL (ref 0.6–1.2)
D AG BLD QL: POSITIVE
EGFRCR SERPLBLD CKD-EPI 2021: >60 ML/MIN/1.73M*2
ERYTHROCYTE [DISTWIDTH] IN BLOOD BY AUTOMATED COUNT: 13.8 % (ref 11.7–14.4)
GLUCOSE SERPL-MCNC: 97 MG/DL (ref 70–99)
HCT VFR BLDCO AUTO: 33.5 % (ref 35–45)
HGB BLD-MCNC: 10.8 G/DL (ref 11.8–15.7)
INR PPP: 1.1
LABORATORY COMMENT REPORT: NORMAL
MAGNESIUM SERPL-MCNC: 2 MG/DL (ref 1.8–2.5)
MCH RBC QN AUTO: 30.3 PG (ref 28–33.2)
MCHC RBC AUTO-ENTMCNC: 32.2 G/DL (ref 32.2–35.5)
MCV RBC AUTO: 93.8 FL (ref 83–98)
P AXIS: 59
PDW BLD AUTO: 11.4 FL (ref 9.4–12.3)
PHOSPHATE SERPL-MCNC: 3.3 MG/DL (ref 2.4–4.7)
PLATELET # BLD AUTO: 397 K/UL (ref 150–369)
POTASSIUM SERPL-SCNC: 4.1 MEQ/L (ref 3.5–5.1)
PR INTERVAL: 132
PROT SERPL-MCNC: 5.4 G/DL (ref 6–8.2)
PROTHROMBIN TIME: 14.1 SEC (ref 12.2–14.5)
QRS DURATION: 78
QT INTERVAL: 448
QTC CALCULATION(BAZETT): 404
R AXIS: 71
RBC # BLD AUTO: 3.57 M/UL (ref 3.93–5.22)
SODIUM SERPL-SCNC: 139 MEQ/L (ref 136–145)
SPECIMEN EXP DATE BLD: NORMAL
T WAVE AXIS: 56
VENTRICULAR RATE: 49
WBC # BLD AUTO: 9.17 K/UL (ref 3.8–10.5)

## 2024-01-12 PROCEDURE — 0FT44ZZ RESECTION OF GALLBLADDER, PERCUTANEOUS ENDOSCOPIC APPROACH: ICD-10-PCS | Performed by: COLON & RECTAL SURGERY

## 2024-01-12 PROCEDURE — 25800000 HC PHARMACY IV SOLUTIONS: Performed by: STUDENT IN AN ORGANIZED HEALTH CARE EDUCATION/TRAINING PROGRAM

## 2024-01-12 PROCEDURE — 25800000 HC PHARMACY IV SOLUTIONS: Performed by: NURSE ANESTHETIST, CERTIFIED REGISTERED

## 2024-01-12 PROCEDURE — G0378 HOSPITAL OBSERVATION PER HR: HCPCS

## 2024-01-12 PROCEDURE — 84100 ASSAY OF PHOSPHORUS: CPT

## 2024-01-12 PROCEDURE — 71000011 HC PACU PHASE 1 EA ADDL MIN: Performed by: COLON & RECTAL SURGERY

## 2024-01-12 PROCEDURE — 63700000 HC SELF-ADMINISTRABLE DRUG

## 2024-01-12 PROCEDURE — 71000001 HC PACU PHASE 1 INITIAL 30MIN: Performed by: COLON & RECTAL SURGERY

## 2024-01-12 PROCEDURE — 200200 PR NO CHARGE: Performed by: COLON & RECTAL SURGERY

## 2024-01-12 PROCEDURE — 85610 PROTHROMBIN TIME: CPT

## 2024-01-12 PROCEDURE — 63600000 HC DRUGS/DETAIL CODE: Mod: JW | Performed by: NURSE ANESTHETIST, CERTIFIED REGISTERED

## 2024-01-12 PROCEDURE — 27200000 HC STERILE SUPPLY: Performed by: COLON & RECTAL SURGERY

## 2024-01-12 PROCEDURE — 63600000 HC DRUGS/DETAIL CODE: Mod: JZ | Performed by: STUDENT IN AN ORGANIZED HEALTH CARE EDUCATION/TRAINING PROGRAM

## 2024-01-12 PROCEDURE — 63600000 HC DRUGS/DETAIL CODE: Mod: JZ,JG

## 2024-01-12 PROCEDURE — 36000014 HC OR LEVEL 4 EA ADDL MIN: Performed by: COLON & RECTAL SURGERY

## 2024-01-12 PROCEDURE — 93010 ELECTROCARDIOGRAM REPORT: CPT | Performed by: INTERNAL MEDICINE

## 2024-01-12 PROCEDURE — 63600000 HC DRUGS/DETAIL CODE: Mod: JZ | Performed by: ANESTHESIOLOGY

## 2024-01-12 PROCEDURE — 25000000 HC PHARMACY GENERAL: Performed by: NURSE ANESTHETIST, CERTIFIED REGISTERED

## 2024-01-12 PROCEDURE — 88304 TISSUE EXAM BY PATHOLOGIST: CPT | Performed by: COLON & RECTAL SURGERY

## 2024-01-12 PROCEDURE — 25000000 HC PHARMACY GENERAL: Performed by: STUDENT IN AN ORGANIZED HEALTH CARE EDUCATION/TRAINING PROGRAM

## 2024-01-12 PROCEDURE — 47562 LAPAROSCOPIC CHOLECYSTECTOMY: CPT | Performed by: COLON & RECTAL SURGERY

## 2024-01-12 PROCEDURE — 63600000 HC DRUGS/DETAIL CODE: Mod: JZ,JG | Performed by: COLON & RECTAL SURGERY

## 2024-01-12 PROCEDURE — 36415 COLL VENOUS BLD VENIPUNCTURE: CPT

## 2024-01-12 PROCEDURE — 80048 BASIC METABOLIC PNL TOTAL CA: CPT

## 2024-01-12 PROCEDURE — 86901 BLOOD TYPING SEROLOGIC RH(D): CPT

## 2024-01-12 PROCEDURE — 63700000 HC SELF-ADMINISTRABLE DRUG: Performed by: STUDENT IN AN ORGANIZED HEALTH CARE EDUCATION/TRAINING PROGRAM

## 2024-01-12 PROCEDURE — 25800000 HC PHARMACY IV SOLUTIONS

## 2024-01-12 PROCEDURE — 63600000 HC DRUGS/DETAIL CODE: Mod: JZ | Performed by: NURSE ANESTHETIST, CERTIFIED REGISTERED

## 2024-01-12 PROCEDURE — 63600000 HC DRUGS/DETAIL CODE: Performed by: PHARMACIST

## 2024-01-12 PROCEDURE — 83735 ASSAY OF MAGNESIUM: CPT

## 2024-01-12 PROCEDURE — 80076 HEPATIC FUNCTION PANEL: CPT

## 2024-01-12 PROCEDURE — 85027 COMPLETE CBC AUTOMATED: CPT

## 2024-01-12 PROCEDURE — 37000001 HC ANESTHESIA GENERAL: Performed by: COLON & RECTAL SURGERY

## 2024-01-12 PROCEDURE — 36000004 HC OR LEVEL 4 INITIAL 30MIN: Performed by: COLON & RECTAL SURGERY

## 2024-01-12 PROCEDURE — 85730 THROMBOPLASTIN TIME PARTIAL: CPT

## 2024-01-12 RX ORDER — MIDAZOLAM HYDROCHLORIDE 2 MG/2ML
INJECTION, SOLUTION INTRAMUSCULAR; INTRAVENOUS AS NEEDED
Status: DISCONTINUED | OUTPATIENT
Start: 2024-01-12 | End: 2024-01-12 | Stop reason: SURG

## 2024-01-12 RX ORDER — IBUPROFEN 200 MG
16-32 TABLET ORAL AS NEEDED
Status: DISCONTINUED | OUTPATIENT
Start: 2024-01-12 | End: 2024-01-12 | Stop reason: HOSPADM

## 2024-01-12 RX ORDER — DEXTROSE 40 %
15-30 GEL (GRAM) ORAL AS NEEDED
Status: DISCONTINUED | OUTPATIENT
Start: 2024-01-12 | End: 2024-01-12 | Stop reason: HOSPADM

## 2024-01-12 RX ORDER — IPRATROPIUM BROMIDE AND ALBUTEROL SULFATE 2.5; .5 MG/3ML; MG/3ML
3 SOLUTION RESPIRATORY (INHALATION) ONCE
Status: COMPLETED | OUTPATIENT
Start: 2024-01-12 | End: 2024-01-12

## 2024-01-12 RX ORDER — HYDROMORPHONE HYDROCHLORIDE 1 MG/ML
0.5 INJECTION, SOLUTION INTRAMUSCULAR; INTRAVENOUS; SUBCUTANEOUS
Status: DISCONTINUED | OUTPATIENT
Start: 2024-01-12 | End: 2024-01-12 | Stop reason: HOSPADM

## 2024-01-12 RX ORDER — SODIUM CHLORIDE 9 MG/ML
INJECTION, SOLUTION INTRAVENOUS CONTINUOUS PRN
Status: DISCONTINUED | OUTPATIENT
Start: 2024-01-12 | End: 2024-01-12 | Stop reason: SURG

## 2024-01-12 RX ORDER — FENTANYL CITRATE 50 UG/ML
INJECTION, SOLUTION INTRAMUSCULAR; INTRAVENOUS AS NEEDED
Status: DISCONTINUED | OUTPATIENT
Start: 2024-01-12 | End: 2024-01-12 | Stop reason: SURG

## 2024-01-12 RX ORDER — SODIUM CHLORIDE, SODIUM GLUCONATE, SODIUM ACETATE, POTASSIUM CHLORIDE AND MAGNESIUM CHLORIDE 30; 37; 368; 526; 502 MG/100ML; MG/100ML; MG/100ML; MG/100ML; MG/100ML
80 INJECTION, SOLUTION INTRAVENOUS CONTINUOUS
Status: DISCONTINUED | OUTPATIENT
Start: 2024-01-12 | End: 2024-01-12

## 2024-01-12 RX ORDER — LIDOCAINE HYDROCHLORIDE 10 MG/ML
INJECTION, SOLUTION INFILTRATION; PERINEURAL AS NEEDED
Status: DISCONTINUED | OUTPATIENT
Start: 2024-01-12 | End: 2024-01-12 | Stop reason: SURG

## 2024-01-12 RX ORDER — PROPOFOL 10 MG/ML
INJECTION, EMULSION INTRAVENOUS AS NEEDED
Status: DISCONTINUED | OUTPATIENT
Start: 2024-01-12 | End: 2024-01-12 | Stop reason: SURG

## 2024-01-12 RX ORDER — DEXTROSE 50 % IN WATER (D50W) INTRAVENOUS SYRINGE
25 AS NEEDED
Status: DISCONTINUED | OUTPATIENT
Start: 2024-01-12 | End: 2024-01-12 | Stop reason: HOSPADM

## 2024-01-12 RX ORDER — ONDANSETRON HYDROCHLORIDE 2 MG/ML
4 INJECTION, SOLUTION INTRAVENOUS
Status: DISCONTINUED | OUTPATIENT
Start: 2024-01-12 | End: 2024-01-12 | Stop reason: HOSPADM

## 2024-01-12 RX ORDER — HEPARIN SODIUM 5000 [USP'U]/ML
5000 INJECTION, SOLUTION INTRAVENOUS; SUBCUTANEOUS EVERY 8 HOURS
Status: DISCONTINUED | OUTPATIENT
Start: 2024-01-13 | End: 2024-01-12 | Stop reason: HOSPADM

## 2024-01-12 RX ORDER — ROCURONIUM BROMIDE 10 MG/ML
INJECTION, SOLUTION INTRAVENOUS AS NEEDED
Status: DISCONTINUED | OUTPATIENT
Start: 2024-01-12 | End: 2024-01-12 | Stop reason: SURG

## 2024-01-12 RX ORDER — FAMOTIDINE 10 MG/ML
INJECTION INTRAVENOUS AS NEEDED
Status: DISCONTINUED | OUTPATIENT
Start: 2024-01-12 | End: 2024-01-12 | Stop reason: SURG

## 2024-01-12 RX ORDER — HYDROMORPHONE HYDROCHLORIDE 1 MG/ML
INJECTION, SOLUTION INTRAMUSCULAR; INTRAVENOUS; SUBCUTANEOUS AS NEEDED
Status: DISCONTINUED | OUTPATIENT
Start: 2024-01-12 | End: 2024-01-12 | Stop reason: SURG

## 2024-01-12 RX ORDER — ONDANSETRON HYDROCHLORIDE 2 MG/ML
INJECTION, SOLUTION INTRAVENOUS AS NEEDED
Status: DISCONTINUED | OUTPATIENT
Start: 2024-01-12 | End: 2024-01-12 | Stop reason: SURG

## 2024-01-12 RX ORDER — OXYCODONE HYDROCHLORIDE 5 MG/1
5 TABLET ORAL EVERY 6 HOURS PRN
Qty: 16 TABLET | Refills: 0 | Status: SHIPPED | OUTPATIENT
Start: 2024-01-13

## 2024-01-12 RX ORDER — SODIUM CHLORIDE, SODIUM GLUCONATE, SODIUM ACETATE, POTASSIUM CHLORIDE AND MAGNESIUM CHLORIDE 30; 37; 368; 526; 502 MG/100ML; MG/100ML; MG/100ML; MG/100ML; MG/100ML
INJECTION, SOLUTION INTRAVENOUS CONTINUOUS PRN
Status: DISCONTINUED | OUTPATIENT
Start: 2024-01-12 | End: 2024-01-12 | Stop reason: SURG

## 2024-01-12 RX ORDER — FENTANYL CITRATE 50 UG/ML
50 INJECTION, SOLUTION INTRAMUSCULAR; INTRAVENOUS EVERY 5 MIN PRN
Status: COMPLETED | OUTPATIENT
Start: 2024-01-12 | End: 2024-01-12

## 2024-01-12 RX ORDER — DEXAMETHASONE SODIUM PHOSPHATE 4 MG/ML
INJECTION, SOLUTION INTRA-ARTICULAR; INTRALESIONAL; INTRAMUSCULAR; INTRAVENOUS; SOFT TISSUE AS NEEDED
Status: DISCONTINUED | OUTPATIENT
Start: 2024-01-12 | End: 2024-01-12 | Stop reason: SURG

## 2024-01-12 RX ORDER — KETOROLAC TROMETHAMINE 15 MG/ML
INJECTION, SOLUTION INTRAMUSCULAR; INTRAVENOUS AS NEEDED
Status: DISCONTINUED | OUTPATIENT
Start: 2024-01-12 | End: 2024-01-12 | Stop reason: SURG

## 2024-01-12 RX ORDER — METOCLOPRAMIDE HYDROCHLORIDE 5 MG/ML
INJECTION INTRAMUSCULAR; INTRAVENOUS AS NEEDED
Status: DISCONTINUED | OUTPATIENT
Start: 2024-01-12 | End: 2024-01-12 | Stop reason: SURG

## 2024-01-12 RX ORDER — GUAIFENESIN AND DEXTROMETHORPHAN HYDROBROMIDE 10; 100 MG/5ML; MG/5ML
5 SYRUP ORAL EVERY 4 HOURS PRN
Status: DISCONTINUED | OUTPATIENT
Start: 2024-01-12 | End: 2024-01-12 | Stop reason: HOSPADM

## 2024-01-12 RX ORDER — BUPIVACAINE HYDROCHLORIDE 5 MG/ML
INJECTION, SOLUTION EPIDURAL; INTRACAUDAL
Status: DISCONTINUED | OUTPATIENT
Start: 2024-01-12 | End: 2024-01-12 | Stop reason: HOSPADM

## 2024-01-12 RX ADMIN — IPRATROPIUM BROMIDE AND ALBUTEROL SULFATE 3 ML: .5; 3 SOLUTION RESPIRATORY (INHALATION) at 12:57

## 2024-01-12 RX ADMIN — POTASSIUM CHLORIDE, DEXTROSE MONOHYDRATE AND SODIUM CHLORIDE: 150; 5; 450 INJECTION, SOLUTION INTRAVENOUS at 14:03

## 2024-01-12 RX ADMIN — FENTANYL CITRATE 50 MCG: 50 INJECTION, SOLUTION INTRAMUSCULAR; INTRAVENOUS at 11:06

## 2024-01-12 RX ADMIN — SODIUM CHLORIDE, SODIUM GLUCONATE, SODIUM ACETATE, POTASSIUM CHLORIDE AND MAGNESIUM CHLORIDE: 526; 502; 368; 37; 30 INJECTION, SOLUTION INTRAVENOUS at 12:39

## 2024-01-12 RX ADMIN — HYDROMORPHONE HYDROCHLORIDE 0.25 MG: 1 INJECTION, SOLUTION INTRAMUSCULAR; INTRAVENOUS; SUBCUTANEOUS at 12:51

## 2024-01-12 RX ADMIN — SUGAMMADEX 200 MG: 100 INJECTION, SOLUTION INTRAVENOUS at 12:30

## 2024-01-12 RX ADMIN — PROPOFOL 150 MG: 10 INJECTION, EMULSION INTRAVENOUS at 11:07

## 2024-01-12 RX ADMIN — FENTANYL CITRATE 50 MCG: 50 INJECTION INTRAMUSCULAR; INTRAVENOUS at 13:16

## 2024-01-12 RX ADMIN — HEPARIN SODIUM 5000 UNITS: 5000 INJECTION INTRAVENOUS; SUBCUTANEOUS at 00:10

## 2024-01-12 RX ADMIN — ROCURONIUM BROMIDE 100 MG: 10 INJECTION INTRAVENOUS at 11:07

## 2024-01-12 RX ADMIN — CEFAZOLIN 2 G: 2 INJECTION, POWDER, FOR SOLUTION INTRAMUSCULAR; INTRAVENOUS at 11:14

## 2024-01-12 RX ADMIN — ONDANSETRON HYDROCHLORIDE 4 MG: 2 SOLUTION INTRAMUSCULAR; INTRAVENOUS at 12:26

## 2024-01-12 RX ADMIN — DEXAMETHASONE SODIUM PHOSPHATE 4 MG: 4 INJECTION, SOLUTION INTRAMUSCULAR; INTRAVENOUS at 11:17

## 2024-01-12 RX ADMIN — DOCUSATE SODIUM 100 MG: 100 CAPSULE, LIQUID FILLED ORAL at 08:52

## 2024-01-12 RX ADMIN — FENTANYL CITRATE 50 MCG: 50 INJECTION, SOLUTION INTRAMUSCULAR; INTRAVENOUS at 11:35

## 2024-01-12 RX ADMIN — ACETAMINOPHEN 975 MG: 325 TABLET ORAL at 08:42

## 2024-01-12 RX ADMIN — GUAIFENESIN AND DEXTROMETHORPHAN 5 ML: 100; 10 SYRUP ORAL at 17:08

## 2024-01-12 RX ADMIN — SUGAMMADEX 200 MG: 100 INJECTION, SOLUTION INTRAVENOUS at 12:36

## 2024-01-12 RX ADMIN — KETOROLAC TROMETHAMINE 30 MG: 15 INJECTION, SOLUTION INTRAMUSCULAR; INTRAVENOUS at 12:29

## 2024-01-12 RX ADMIN — METRONIDAZOLE 500 MG: 5 INJECTION, SOLUTION INTRAVENOUS at 00:10

## 2024-01-12 RX ADMIN — OXYCODONE HYDROCHLORIDE 5 MG: 5 TABLET ORAL at 17:11

## 2024-01-12 RX ADMIN — SODIUM CHLORIDE: 9 INJECTION, SOLUTION INTRAVENOUS at 11:02

## 2024-01-12 RX ADMIN — PANTOPRAZOLE SODIUM 40 MG: 40 INJECTION, POWDER, LYOPHILIZED, FOR SOLUTION INTRAVENOUS at 09:00

## 2024-01-12 RX ADMIN — FAMOTIDINE 20 MG: 10 INJECTION, SOLUTION INTRAVENOUS at 11:00

## 2024-01-12 RX ADMIN — HYDROMORPHONE HYDROCHLORIDE 0.25 MG: 1 INJECTION, SOLUTION INTRAMUSCULAR; INTRAVENOUS; SUBCUTANEOUS at 12:45

## 2024-01-12 RX ADMIN — POTASSIUM CHLORIDE, DEXTROSE MONOHYDRATE AND SODIUM CHLORIDE: 150; 5; 450 INJECTION, SOLUTION INTRAVENOUS at 08:22

## 2024-01-12 RX ADMIN — LIDOCAINE HYDROCHLORIDE 5 ML: 10 INJECTION, SOLUTION INFILTRATION; PERINEURAL at 11:07

## 2024-01-12 RX ADMIN — METOCLOPRAMIDE 10 MG: 5 INJECTION, SOLUTION INTRAMUSCULAR; INTRAVENOUS at 10:57

## 2024-01-12 RX ADMIN — METRONIDAZOLE 500 MG: 5 INJECTION, SOLUTION INTRAVENOUS at 08:21

## 2024-01-12 RX ADMIN — MIDAZOLAM HYDROCHLORIDE 2 MG: 1 INJECTION, SOLUTION INTRAMUSCULAR; INTRAVENOUS at 11:02

## 2024-01-12 RX ADMIN — CEFTRIAXONE SODIUM 1 G: 1 INJECTION, POWDER, FOR SOLUTION INTRAMUSCULAR; INTRAVENOUS at 00:10

## 2024-01-12 RX ADMIN — FENTANYL CITRATE 50 MCG: 50 INJECTION INTRAMUSCULAR; INTRAVENOUS at 13:22

## 2024-01-12 ASSESSMENT — COGNITIVE AND FUNCTIONAL STATUS - GENERAL
CLIMB 3 TO 5 STEPS WITH RAILING: 4 - NONE
STANDING UP FROM CHAIR USING ARMS: 4 - NONE
WALKING IN HOSPITAL ROOM: 4 - NONE
MOVING TO AND FROM BED TO CHAIR: 4 - NONE

## 2024-01-12 ASSESSMENT — ENCOUNTER SYMPTOMS: HEADACHES: 1

## 2024-01-12 ASSESSMENT — PAIN SCALES - GENERAL: PAIN_LEVEL: 3

## 2024-01-12 NOTE — PLAN OF CARE
Problem: Adult Inpatient Plan of Care  Goal: Plan of Care Review  Outcome: Progressing  Flowsheets (Taken 1/12/2024 1812)  Progress: improving  Outcome Evaluation: Pt post op and being discharged home  Plan of Care Reviewed With: patient  Goal: Patient-Specific Goal (Individualized)  Outcome: Progressing  Goal: Absence of Hospital-Acquired Illness or Injury  Outcome: Progressing  Goal: Optimal Comfort and Wellbeing  Outcome: Progressing  Goal: Readiness for Transition of Care  Outcome: Progressing   Plan of Care Review  Plan of Care Reviewed With: patient  Progress: improving  Outcome Evaluation: Pt post op and being discharged home

## 2024-01-12 NOTE — PLAN OF CARE
Care Coordination Discharge Plan Note     Discharge Needs Assessment  Concerns to be Addressed: no discharge needs identified  Current Discharge Risk:      Anticipated Discharge Plan  Anticipated Discharge Disposition: home without assistance or services     Patient Choice  Offered/Gave Vendor List: no  Patient's Choice of Community Agency(s):      Concerns Comments: Per update from clinical team dc following OR today vs tomorrow. No discharge needs anticipated.    Discharge Plan:   Disposition/Destination: Home  /    Discharge Facility:    Community Resources:      Discharge Transportation:  Is Out of Hospital DNR needed at Discharge: no  Does patient need discharge transport?

## 2024-01-12 NOTE — ANESTHESIA PROCEDURE NOTES
Airway  Urgency: elective    Start Time: 1/12/2024 11:13 AM  Airway not difficult    General Information and Staff    Patient location during procedure: OR  Performed by: Herlinda Grant CRNA  Authorized by: Magda Mathews MD      Indications and Patient Condition  Indications for airway management: anesthesia  Sedation level: deep  Preoxygenated: yes  Patient position: sniffing  MILS maintained throughout  Mask difficulty assessment: 0 - not attempted    Final Airway Details  Final airway type: endotracheal airway      Successful airway: ETT  Cuffed: yes   Successful intubation technique: video laryngoscopy  Facilitating devices/methods: intubating stylet  Endotracheal tube insertion site: oral  Blade type: meyer.  Blade size: #3  ETT size (mm): 7.0  Cormack-Lehane Classification: grade I - full view of glottis  Placement verified by: chest auscultation and capnometry   Measured from: lips  ETT to lips (cm): 22  Number of attempts at approach: 1  Number of other approaches attempted: 0  Atraumatic airway insertion

## 2024-01-12 NOTE — ANESTHESIOLOGIST PRE-PROCEDURE ATTESTATION
Pre-Procedure Patient Identification:  I am the Primary Anesthesiologist and have identified the patient on 01/12/24 at 10:31 AM.   I have confirmed the procedure(s) will be performed by the following surgeon/proceduralist Notamika, Harish WHITE MD.

## 2024-01-12 NOTE — DISCHARGE INSTRUCTIONS
[facility] Discharge Instructions  [facility]      You had acute cholecystitis and underwent a laparoscopic cholecystectomy      Follow Up Appointment:  Call [phone] to schedule an appointment with Dr Mae in 10-14 days after discharge   Please make an appointment to see your PCP in 2-4 weeks following discharge    Medications:  Resume all home medications   AVOID blood thinning medications such as NSAIDS (Aleve, Advil, Ibuprofen, Motrin, Aspirin, ect)    PAIN: Take Oxycode 5mg 1 tablet every 4- 6 hours as needed for severe pain. You can also take TYLENOL 650mg every 4 hours as needed for mild pain. Do not operate heavy machinery while taking the narcotics       Reasons to Call the Surgeon:  Severe and persistent shortness of breath not relieved with rest   Severe abdominal pain, nausea or vomiting   Fever above 101.5 oF   Redness, swelling or drainage from incision     Activity:  No strenuous exercise or heavy lifting until follow up with doctor   No driving while on pain medication, or for the first few days  Walking is the best exercise. Walk at least 2-3 times a day and increase your distance a small amount each day   Going up and down stairs is okay       Nutrition:  Low fat diet       Wound Care Instructions:  You have 4 incisions which are covered with steri-strips, they will fall off on their own in several days.  Shower daily  Clean incision with soap and water. No lotions, creams, perfumes, etc. directly on incision   No tub baths until incision completely healed in about 4-6 weeks       Avoid Constipation  Drink plenty of water   Use over the counter stool softeners (Colace), laxatives (senokot, milk of magnesia), or suppositories (Dulcolax)   Stop medications if experience diarrhea     Please call Dr. Mae's office if you have any questions/concerns. Call if you experience the following: fevers (>101)/chills, nausea, vomiting, diarrhea, pain not controlled by prescribed  medications, bleeding or other drainage from wound, headache, lightheadedness, dizziness or changes in vision.     Dr. Harish Mae  [facility]  [address]  [address] [place] [address] [phone]

## 2024-01-12 NOTE — ANESTHESIA PREPROCEDURE EVALUATION
Relevant Problems   No relevant active problems       Anesthesia ROS/MED HX    Anesthesia History    Previous anesthetics  No history of anesthetic complications  Pulmonary - neg  Neuro/Psych    Headaches  Cardiovascular- neg   ECG reviewed  Hematological    thrombocytopenia  GI/Hepatic   GERD    Control: well controlled  Musculoskeletal- neg  Renal Disease- neg  Endo/Other- neg  Body Habitus: Normal  ROS/MED HX Comments:    ROS/Hx: History of ITP s/p splenectomy- no treatments needed after the splenectomy  Takes clonazepam prn at home  Pt had CP last night- EKG Sinus bradycardia.  Pt is active w a 1 year old child.  She does not have CP or SOB at home.  She also says she has CP when she coughs- she has had URI symptoms.  Her CP does not appear to be cardiac related     Hemoglobin 10.8, plt 397  Pregnancy negative  Vomited yesterday due to migraines but nausea has resolved.       No past surgical history on file.    Physical Exam    Airway   Mallampati: II  Cardiovascular - normal   Rhythm: regular   Rate: normalPulmonary - normal   clear to auscultation  Dental - normal        Anesthesia Plan    Plan: general    Technique: general endotracheal     Lines and Monitors: PIV     Airway: video laryngoscope   2 ASA  Blood Products:     Use of Blood Products Discussed: Yes     Consented to blood products  Anesthetic plan and risks discussed with: patient  Induction:    intravenous   Postop Plan:   Patient Disposition: inpatient floor planned admission   Pain Management: IV analgesics  Comments:    Plan: Rapid sequence induction.

## 2024-01-12 NOTE — OR SURGEON
Pre-Procedure patient identification:  I am the primary operating surgeon/proceduralist and I have reviewed the applicable pathology reports and radiology studies for this procedure. I have identified the patient on 01/12/24 at 10:03 AM Harish Mae MD  Phone Number: [phone]

## 2024-01-12 NOTE — OP NOTE
CHOLECYSTECTOMY LAPAROSCOPIC Procedure Note    Hospital: [facility]  Medical Record Number:  [id number]  Patient Name:  Arianna Yee  YOB: 1990   Date of Operation:  1/12/2024    Procedure:    CHOLECYSTECTOMY LAPAROSCOPIC  CPT(R) Code:  99624 - [place] LAP,CHOLECYSTECTOMY      Pre-op Diagnosis     * Cholelithiasis and acute cholecystitis without obstruction [K80.00]       Post-op Diagnosis     * Cholelithiasis and acute cholecystitis without obstruction [K80.00]    Surgeon(s) and Role:     * Harish Mae MD - Primary     * Sakshi Mccain MD - Resident - Assisting     * Harriet Peña DO - Resident - Observing    Anesthesia: General    Staff:   Circulator: Clive More, CHRISTOPHER; Zandra Paz RN  Scrub Person: Hannah Weinstein RN; Melisa Nuñez RN     Clinical History: This 34-year-old woman presented with significant right upper quadrant pain.  Imaging shows significant gallbladder wall thickening but no obvious stones.  It appears that she has a calculus cholecystitis.  She has improved somewhat with antibiotics.  She presents for cholecystectomy.    Findings: Significant gallbladder wall edema and reactive fluid around the liver.    Procedure Details   The patient was placed in supine position the operating table and general anesthesia was induced. The abdomen was prepped with ChloraPrep and draped sterilely. A vertical incision in the umbilicus was made and the peritoneal cavity was entered. Stay sutures placed and the Brandt trocar was inserted. The abdomen was inflated. A 5 mm subxiphoid port was placed, and 2 right subcostal 5 mm ports were placed. The gallbladder was retracted superiorly and the peritoneum stripped off of the neck of the gallbladder to expose the cystic duct. This was doubly clipped and divided. The cystic artery was doubly clipped and divided. The gallbladder was  from the liver bed using hook cautery.  There was no spillage  of bile or stones.  The gallbladder was placed in a specimen bag and removed through the umbilical port. Hemostasis was confirmed. The abdomen was deflated and the ports removed. The umbilical site was closed with a figure-of-eight suture of 0 Maxon and the skin incisions closed with 4-0 Polysorb subcutaneous sutures and Steri-Strips.    Estimated Blood Loss: No blood loss documented.    Specimens:                Order Name Source Comment Collection Info Order Time   PATHOLOGY TISSUE EXAM Gallbladder Pre-op diagnosis:  Cholelithiasis and acute cholecystitis without obstruction [K80.00] Collected By: Harish Mae MD 1/12/2024 12:08 PM     Release to patient   Immediate              Drains: * No LDAs found *    Implants: * No implants in log *           Complications:  None; patient tolerated the procedure well.           Disposition: PACU - hemodynamically stable.           Condition: stable    Harish Mae MD

## 2024-01-12 NOTE — PLAN OF CARE
Plan of Care Review  Plan of Care Reviewed With: patient  Progress: no change  Outcome Evaluation: pt continued to have c/o pain to abdomen with relief from prn oxycodone. NPO since MN for sx in AM.

## 2024-01-12 NOTE — BRIEF OP NOTE
CHOLECYSTECTOMY LAPAROSCOPIC Procedure Note    Procedure:    CHOLECYSTECTOMY LAPAROSCOPIC  CPT(R) Code:  96982 - [place] LAP,CHOLECYSTECTOMY      Pre-op Diagnosis     * Cholelithiasis and acute cholecystitis without obstruction [K80.00]       Post-op Diagnosis     * Cholelithiasis and acute cholecystitis without obstruction [K80.00]    Surgeon(s) and Role:     * Harish Mae MD - Primary     * Sakshi Mccain MD - Resident - Assisting     * Harriet Peña DO - Resident - Observing    Anesthesia: General    Staff:   Circulator: Clive More, CHRISTOPHER; Zandra Paz RN  Scrub Person: Hannah Weinstein RN; Melisa Nuñez RN    Procedure Details   Laparoscopic cholecystectomy     Estimated Blood Loss: No blood loss documented.    Specimens:                Order Name Source Comment Collection Info Order Time   PATHOLOGY TISSUE EXAM Gallbladder Pre-op diagnosis:  Cholelithiasis and acute cholecystitis without obstruction [K80.00] Collected By: Harish Mae MD 1/12/2024 12:08 PM     Release to patient   Immediate              Drains: * No LDAs found *    Implants: * No implants in log *           Complications:  None; patient tolerated the procedure well.           Disposition: PACU - hemodynamically stable.           Condition: stable    Harish Mae MD  Phone Number: [phone]

## 2024-01-12 NOTE — PROGRESS NOTES
[facility] Service (Minimally Invasive Surgery, Breast Surgery, [facility] Colorectal Surgery) Daily Progress Note  Pager: [phone]    Subjective     Patient is feeling much improved this morning, severe migraine pain has resolved. She denies nausea, emesis. Endorses flatus and bowel movements. Slight abdominal discomfort at present. NPO for OR today.     Objective     Vital signs in last 24 hours:  Temp:  [36.5 °C (97.7 °F)-36.9 °C (98.5 °F)] 36.5 °C (97.7 °F)  Heart Rate:  [48-85] 55  Resp:  [14-18] 18  BP: (110-131)/(61-79) 126/76      Intake/Output Summary (Last 24 hours) at 1/12/2024 1049  Last data filed at 1/11/2024 1707  Gross per 24 hour   Intake 150 ml   Output --   Net 150 ml     Intake/Output this shift:  No intake/output data recorded.    Physical Exam  General appearance: alert, appears stated age and cooperative  Head: normocephalic, without obvious abnormality, atraumatic  Eyes: PERRL, EOMI  Neck: no JVD and supple, symmetrical, trachea midline  Lungs: Non copious breathing, symmetrical chest expansion  Heart: RRR  Abdomen: Soft, mildly TTP right abdomen, no guarding, no rigidity, not peritonitic  Extremities: extremities normal, warm and well-perfused; no cyanosis, clubbing, or edema  Skin: Skin color, texture, turgor normal. No rashes or lesions  Neurologic: Grossly normal      VTE Assessment: I have reassessed and the patient's VTE risk and treatment plan is appropriate.    Labs  CBC Results       01/12/24 01/11/24 01/10/24     0547 0357 1742    WBC 9.17 13.63 13.40    RBC 3.57 3.35 3.68    HGB 10.8 10.1 11.1    HCT 33.5 31.2 34.5    MCV 93.8 93.1 93.8    MCH 30.3 30.1 30.2    MCHC 32.2 32.4 32.2     386 448        CMP Results       01/12/24 01/11/24 01/10/24     0547 0357 1742     139 142    K 4.1 3.4 3.9    Cl 110 107 109    CO2 24 25 26    Glucose 97 111 82    BUN 7 11 14    Creatinine 0.6 0.7 0.6    Calcium 8.1 8.3 8.6    Anion Gap 5 7 7    AST 57 124 84    ALT 88 127 94    Albumin 3.1 3.3 3.6     EGFR >60.0 >60.0 >60.0         Comment for K at 0357 on 01/11/24: Results obtained on plasma. Plasma Potassium values may be up to 0.4 mEQ/L less than serum values. The differences may be greater for patients with high platelet or white cell counts.    Comment for K at 1742 on 01/10/24: Results obtained on plasma. Plasma Potassium values may be up to 0.4 mEQ/L less than serum values. The differences may be greater for patients with high platelet or white cell counts.    Comment for EGFR at 0547 on 01/12/24: Calculation based on the Chronic Kidney Disease Epidemiology Collaboration (CKD-EPI) equation refit without adjustment for race.    Comment for EGFR at 0357 on 01/11/24: Calculation based on the Chronic Kidney Disease Epidemiology Collaboration (CKD-EPI) equation refit without adjustment for race.    Comment for EGFR at 1742 on 01/10/24: Calculation based on the Chronic Kidney Disease Epidemiology Collaboration (CKD-EPI) equation refit without adjustment for race.          Assessment/Plan     34 y.o. F, with PMH of ITP s/p laparoscopic splenectomy (2010), urinary stent, who presents with abdominal soreness and nausea. Labs significant for leukocytosis 13.4, BMP within normal limits.  LFTs mildly elevated (AST 84, ALT 94, , T. bili 0.2, lipase 27). Ultrasound showing gallbladder wall thickening 4.4-5mm, pericholecystic fluid. CBD is 1.8 mm, consistent with acute cholecystitis     -OR today, lap brandi   -Ceftriaxone, Flagyl  -Pain meds  -Plan for low fat diet 2 wks at dc    Please page [phone] with any questions/concerns.  [address] Arroyo C

## 2024-01-12 NOTE — ANESTHESIA POSTPROCEDURE EVALUATION
Patient: Arianna Yee    Procedure Summary     Date: 01/12/24 Room / Location: [facility] OR 12 / [facility] OR    Anesthesia Start: 1102 Anesthesia Stop: 1257    Procedure: CHOLECYSTECTOMY LAPAROSCOPIC Diagnosis:       Cholelithiasis and acute cholecystitis without obstruction      (Cholelithiasis and acute cholecystitis without obstruction [K80.00])    Surgeons: Harish aMe MD Responsible Provider: Magda Mathews MD    Anesthesia Type: general ASA Status: 2          Anesthesia Type: general  PACU Vitals  1/12/2024 1239 - 1/12/2024 1339      1/12/2024  1243 1/12/2024  1300 1/12/2024  1315 1/12/2024  1316    BP: 112/67 117/61 128/92 118/61    Temp: 37.1 °C (98.7 °F) -- -- --    Pulse: 76 92 104 100    Resp: 18 20 18 20    SpO2: 100 % 100 % 98 % --              1/12/2024  1322 1/12/2024  1330          BP: 110/60 102/55      Temp: -- --      Pulse: 70 70      Resp: 16 15      SpO2: -- 99 %              Anesthesia Post Evaluation    Pain score: 3  Pain management: adequate  Mode of pain management: IV medication  Patient location during evaluation: PACU  Patient participation: complete - patient participated  Level of consciousness: awake and alert  Cardiovascular status: acceptable  Airway Patency: adequate  Respiratory status: acceptable  Hydration status: acceptable  Anesthetic complications: no

## 2024-01-13 NOTE — NURSING NOTE
D/C instructions reviewed with pt and a copy was given to her. She verbalized an understanding and denied any questions.  Pt D/C to home

## 2024-01-15 ENCOUNTER — TELEPHONE (OUTPATIENT)
Dept: SURGERY | Facility: CLINIC | Age: 34
End: 2024-01-15
Payer: COMMERCIAL

## 2024-01-15 LAB
CASE RPRT: NORMAL
CLINICAL INFO: NORMAL
PATH REPORT.FINAL DX SPEC: NORMAL
PATH REPORT.GROSS SPEC: NORMAL

## 2024-01-17 PROBLEM — Z71.89: Status: ACTIVE | Noted: 2024-01-17

## 2024-01-17 NOTE — TELEPHONE ENCOUNTER
She had a laparoscopic cholecystectomy a week and a half ago.  She had some cold-like symptoms at that time and then was improving but now is feeling worse.  She just recently got tested and was negative for flu but positive for COVID.  She does not have any risk factors such as pulmonary disease.  She is vaccinated but not boosted recently.  I do not think she is at any higher risk because of her recent surgery.  I we decided not to pursue Paxlovid.

## 2024-01-17 NOTE — DISCHARGE SUMMARY
Inpatient Discharge Summary    BRIEF OVERVIEW  Admitting Provider:  H&P Notes 12/17/2023 to 1/17/2024         Date of Service Author Author Type Status Note Type File Time    01/11/24 0029 Aneesh Wall MD Resident Attested H&P 01/11/24 1827          Attending Provider: No att. providers found Attending phys phone: N/A  Primary Care Physician at Discharge: Delfino Boothe [phone]    Admission Date: 1/10/2024     Discharge Date: 1/16/2024    Primary Discharge Diagnosis  Cholelithiasis and acute cholecystitis without obstruction    Secondary Discharge Diagnosis  Active Hospital Problems    Diagnosis Date Noted   • Cholelithiasis and acute cholecystitis without obstruction 01/11/2024      Resolved Hospital Problems   No resolved problems to display.       DETAILS OF HOSPITAL STAY    Operative Procedures Performed  Procedure(s):  CHOLECYSTECTOMY LAPAROSCOPIC    Consults:       Consult Orders During Admission:  None       Imaging  X-RAY CHEST 1 VIEW    Result Date: 1/11/2024  IMPRESSION: No active disease.    CT ABDOMEN PELVIS WITH IV CONTRAST    Addendum Date: 1/11/2024    Vipul ERVIN spoke with Dr. Ladonna Sen at 8:58am on 1/11/2024.    Result Date: 1/11/2024  IMPRESSION: 1.  Redemonstrated edematous thickening of gallbladder wall and small amount of pericholecystic fluid. Findings are concordant with prior sonogram and compatible with acute acalculous cholecystitis based on patient's symptoms. However, consider reactive gallbladder changes secondary to primary hepatic pathology in the absence of cholelithiasis. 2.  Mild hepatomegaly. Periportal edema is nonspecific but could be compatible with acute hepatitis or cholangitis. 3.  Status post splenectomy. 4.  Trace abdominal ascites. Moderate pelvic ascites. 5.  Very small right pleural effusion with mild bibasilar atelectasis. Consider obtaining abdominal MRI/MRCP with and without contrast for further evaluation of liver, gallbladder and biliary tree, as  clinically indicated.    ULTRASOUND GALLBLADDER    Result Date: 1/10/2024  IMPRESSION: Gallbladder wall thickening and pericholecystic fluid, concerning for acute cholecystitis.       Presenting Problem/History of Present Illness  Acalculous cholecystitis [K81.9]  Cholelithiasis and acute cholecystitis without obstruction [K80.00]     Hospital Course  Arianna Yee presented to the ED on 1/10 with two days of upper abdominal pain which became severe. She was found to have thickening inflammatory changes of the gallbladder with no obvious stones on ultrasound and was diagnosed with acalculous cholecystitis. She was admitted to the general surgery service with plans for a laparoscopic cholecystectomy. On 1/11, she complained of a severe migraine causing pain and nausea that was refractory to tylenol, NSAIDs, and the narcotic pain medications prescribed for her abdominal pain. Her migraine eventually subsided with administration of sumitriptan, IV benadryl, and other analgesic medications. On 1/12 she underwent a laparoscopic cholecystectomy without complication and recovered in the PACU before being discharged to [facility]. On post-op exam, she was feeling very well and denied pain, and had been tolerating a regular low fat diet. She was discharged home that evening with instructions to follow up in the office in two weeks.     Discharge Orders     Medication List      START taking these medications    oxyCODONE 5 mg immediate release tablet  Commonly known as: ROXICODONE  Take 1 tablet (5 mg total) by mouth every 6 (six) hours as needed for severe pain.  Dose: 5 mg        CONTINUE taking these medications    clonazePAM 0.5 mg tablet  Commonly known as: klonoPIN  Take 0.5 mg by mouth daily as needed for anxiety.  Dose: 0.5 mg              Instructions for after discharge     Call provider for:  persistent nausea or vomiting      Call provider for:  redness, tenderness, or signs of infection (pain, swelling,  redness, odor or green/yellow discharge around incision site)      Call provider for:  severe uncontrolled pain      Call provider for:  temperature >100.4      Follow-up with primary physician (PCP)      Follow-up with provider      Harish Mae MD   [phone]    [address]  [place]  [facility] [address]             Outpatient Follow-Ups            In 2 weeks Harish Mae MD [facility]        Referrals:  No orders of the defined types were placed in this encounter.      Test Results Pending at Discharge  Unresulted Labs (From admission, onward)    None        Discharge Disposition  Disposition: Home   Destination:        Code Status at Discharge: Prior  Physician Order for Life-Sustaining Treatment Document Status      No documents found

## 2024-02-08 ENCOUNTER — OFFICE VISIT (OUTPATIENT)
Dept: SURGERY | Facility: CLINIC | Age: 34
End: 2024-02-08
Payer: COMMERCIAL

## 2024-02-08 VITALS
HEIGHT: 60 IN | SYSTOLIC BLOOD PRESSURE: 120 MMHG | BODY MASS INDEX: 23.56 KG/M2 | OXYGEN SATURATION: 98 % | HEART RATE: 98 BPM | WEIGHT: 120 LBS | DIASTOLIC BLOOD PRESSURE: 72 MMHG | TEMPERATURE: 97.9 F

## 2024-02-08 DIAGNOSIS — K81.0 ACUTE CHOLECYSTITIS WITHOUT CALCULUS: Primary | ICD-10-CM

## 2024-02-08 DIAGNOSIS — N13.39 OTHER HYDRONEPHROSIS: ICD-10-CM

## 2024-02-08 PROCEDURE — 99024 POSTOP FOLLOW-UP VISIT: CPT | Performed by: COLON & RECTAL SURGERY

## 2024-02-08 NOTE — ASSESSMENT & PLAN NOTE
MD Notification    Notified Person: MD    Notified Person Name: Dr. Hensley with Cards    Notification Date/Time:10/14/18 @ 1050  Notification Interaction: Spoke with physician    Purpose of Notification:  Pt c/o epigastric/ substernal CP, 12 lead performed that appeared to have Twave changes, had Dr. Hensley assess it. /57  Orders Received:    Comments:  MD went to assess pt   She is healing up very nicely from her surgery.  She does not have any diet or activity restrictions except to avoid heavy lifting and straining.  Her child is a bit over 20 pounds but I think it is okay for her to lift the baby.  We discussed avoiding sun exposure on the fresh incisions.  She will follow-up with me as needed.

## 2024-02-08 NOTE — ASSESSMENT & PLAN NOTE
She has mild hydronephrosis on the right side on her imaging.  She had hydronephrosis during her pregnancy and required stent placement.  I am concerned that she has not returned to normal and I recommended follow-up with urology.

## 2024-02-08 NOTE — PROGRESS NOTES
Colorectal Postoperative Follow-up     Arianna Yee returns after her recent procedure.    She presented to the hospital with significant right upper quadrant pain and imaging showed gallbladder wall thickening without any obvious stones.  We proceeded to laparoscopic cholecystectomy and confirmed that edematous gallbladder with reactive fluid.  Uncomplicated laparoscopic cholecystectomy was performed and she went home later on the day of surgery.    Subjective: She feels well now.  She is not having any further pain.  She has been eating normally.  She is not having any postprandial diarrhea.  She does get some discomfort at the most lateral port site when she is holding her baby.    She developed a COVID infection the week after her discharge and she has recovered from this now.    Of note, in addition to the biliary findings, she was noted to have mild hydronephrosis on the right side.  She informs me that during her pregnancy 14 months ago, she had hydronephrosis that required stent placement.    Exam:   Physical Exam  Cardiovascular:      Rate and Rhythm: Normal rate and regular rhythm.   Pulmonary:      Breath sounds: Normal breath sounds.   Abdominal:      General: There is no distension.      Palpations: Abdomen is soft. There is no mass.      Tenderness: There is no abdominal tenderness.      Comments: Laparoscopic incisions are healing beautifully.         CHOLECYSTECTOMY LAPAROSCOPIC Procedure Note     Hospital: [facility]  Medical Record Number:  [id number]  Patient Name:  Arianna Yee  YOB: 1990   Date of Operation:  1/12/2024     Procedure:    CHOLECYSTECTOMY LAPAROSCOPIC  CPT(R) Code:  10896 - CT LAP,CHOLECYSTECTOMY        Pre-op Diagnosis     * Cholelithiasis and acute cholecystitis without obstruction [K80.00]       Post-op Diagnosis     * Cholelithiasis and acute cholecystitis without obstruction [K80.00]     Surgeon(s) and Role:     * Harish Mae MD - Primary      * Sakshi Mccain MD - Resident - Assisting     * Harriet Peña DO - Resident - Observing     Anesthesia: General     Staff:   Circulator: Clive More, CHRISTOPHER; Zandra Paz RN  Scrub Person: Hannah Weinstein RN; Melisa Nuñez RN      Clinical History: This 34-year-old woman presented with significant right upper quadrant pain.  Imaging shows significant gallbladder wall thickening but no obvious stones.  It appears that she has acalculus cholecystitis.  She has improved somewhat with antibiotics.  She presents for cholecystectomy.     Findings: Significant gallbladder wall edema and reactive fluid around the liver.     Procedure Details   The patient was placed in supine position the operating table and general anesthesia was induced. The abdomen was prepped with ChloraPrep and draped sterilely. A vertical incision in the umbilicus was made and the peritoneal cavity was entered. Stay sutures placed and the Brandt trocar was inserted. The abdomen was inflated. A 5 mm subxiphoid port was placed, and 2 right subcostal 5 mm ports were placed. The gallbladder was retracted superiorly and the peritoneum stripped off of the neck of the gallbladder to expose the cystic duct. This was doubly clipped and divided. The cystic artery was doubly clipped and divided. The gallbladder was  from the liver bed using hook cautery.  There was no spillage of bile or stones.  The gallbladder was placed in a specimen bag and removed through the umbilical port. Hemostasis was confirmed. The abdomen was deflated and the ports removed. The umbilical site was closed with a figure-of-eight suture of 0 Maxon and the skin incisions closed with 4-0 Polysorb subcutaneous sutures and Steri-Strips.    Final Diagnosis   Date Value Ref Range Status   01/12/2024   Final    GALLBLADDER:    Gallbladder with no pathologic change.        **ATTENTION PATIENTS**  **The findings in this report have been made available  for review potentially before your provider has had a chance to review and discuss the results with you.  Please allow time for your provider to review your results.  If you have any questions or concerns about these results, please contact the healthcare provider who ordered the test.**               Assessment / Plan    Acute cholecystitis without calculus  She is healing up very nicely from her surgery.  She does not have any diet or activity restrictions except to avoid heavy lifting and straining.  Her child is a bit over 20 pounds but I think it is okay for her to lift the baby.  We discussed avoiding sun exposure on the fresh incisions.  She will follow-up with me as needed.    Other hydronephrosis  She has mild hydronephrosis on the right side on her imaging.  She had hydronephrosis during her pregnancy and required stent placement.  I am concerned that she has not returned to normal and I recommended follow-up with urology.

## 2024-02-08 NOTE — LETTER
February 8, 2024     Delfino Boothe  [address] [facility] [address]    Patient: Arianna Yee  YOB: 1990  Date of Visit: 2/8/2024      Dear Dr. Boothe:    Thank you for referring Arianna Yee to me for evaluation. Below are my notes for this consultation.    If you have questions, please do not hesitate to call me. I look forward to following your patient along with you.         Sincerely,        Harish Mae MD        CC: No Recipients    Harish Mae MD  2/8/2024  5:46 PM  Signed  Colorectal Postoperative Follow-up     Arianna Yee returns after her recent procedure.    She presented to the hospital with significant right upper quadrant pain and imaging showed gallbladder wall thickening without any obvious stones.  We proceeded to laparoscopic cholecystectomy and confirmed that edematous gallbladder with reactive fluid.  Uncomplicated laparoscopic cholecystectomy was performed and she went home later on the day of surgery.    Subjective: She feels well now.  She is not having any further pain.  She has been eating normally.  She is not having any postprandial diarrhea.  She does get some discomfort at the most lateral port site when she is holding her baby.    She developed a COVID infection the week after her discharge and she has recovered from this now.    Of note, in addition to the biliary findings, she was noted to have mild hydronephrosis on the right side.  She informs me that during her pregnancy 14 months ago, she had hydronephrosis that required stent placement.    Exam:   Physical Exam  Cardiovascular:      Rate and Rhythm: Normal rate and regular rhythm.   Pulmonary:      Breath sounds: Normal breath sounds.   Abdominal:      General: There is no distension.      Palpations: Abdomen is soft. There is no mass.      Tenderness: There is no abdominal tenderness.      Comments: Laparoscopic incisions are healing beautifully.         CHOLECYSTECTOMY LAPAROSCOPIC Procedure  Note     Hospital: [facility]  Medical Record Number:  [id number]  Patient Name:  Arianna Yee  YOB: 1990   Date of Operation:  1/12/2024     Procedure:    CHOLECYSTECTOMY LAPAROSCOPIC  CPT(R) Code:  67187 - MI LAP,CHOLECYSTECTOMY        Pre-op Diagnosis     * Cholelithiasis and acute cholecystitis without obstruction [K80.00]       Post-op Diagnosis     * Cholelithiasis and acute cholecystitis without obstruction [K80.00]     Surgeon(s) and Role:     * Harish Mae MD - Primary     * Sakshi Mccain MD - Resident - Assisting     * Harriet Peña DO - Resident - Observing     Anesthesia: General     Staff:   Circulator: Clive More, CHRISTOPHER; Zandra Paz RN  Scrub Person: Hannah Weinstein RN; Melisa Nuñez RN      Clinical History: This 34-year-old woman presented with significant right upper quadrant pain.  Imaging shows significant gallbladder wall thickening but no obvious stones.  It appears that she has acalculus cholecystitis.  She has improved somewhat with antibiotics.  She presents for cholecystectomy.     Findings: Significant gallbladder wall edema and reactive fluid around the liver.     Procedure Details   The patient was placed in supine position the operating table and general anesthesia was induced. The abdomen was prepped with ChloraPrep and draped sterilely. A vertical incision in the umbilicus was made and the peritoneal cavity was entered. Stay sutures placed and the Brandt trocar was inserted. The abdomen was inflated. A 5 mm subxiphoid port was placed, and 2 right subcostal 5 mm ports were placed. The gallbladder was retracted superiorly and the peritoneum stripped off of the neck of the gallbladder to expose the cystic duct. This was doubly clipped and divided. The cystic artery was doubly clipped and divided. The gallbladder was  from the liver bed using hook cautery.  There was no spillage of bile or stones.  The  gallbladder was placed in a specimen bag and removed through the umbilical port. Hemostasis was confirmed. The abdomen was deflated and the ports removed. The umbilical site was closed with a figure-of-eight suture of 0 Maxon and the skin incisions closed with 4-0 Polysorb subcutaneous sutures and Steri-Strips.    Final Diagnosis   Date Value Ref Range Status   01/12/2024   Final    GALLBLADDER:    Gallbladder with no pathologic change.        **ATTENTION PATIENTS**  **The findings in this report have been made available for review potentially before your provider has had a chance to review and discuss the results with you.  Please allow time for your provider to review your results.  If you have any questions or concerns about these results, please contact the healthcare provider who ordered the test.**               Assessment / Plan    Acute cholecystitis without calculus  She is healing up very nicely from her surgery.  She does not have any diet or activity restrictions except to avoid heavy lifting and straining.  Her child is a bit over 20 pounds but I think it is okay for her to lift the baby.  We discussed avoiding sun exposure on the fresh incisions.  She will follow-up with me as needed.    Other hydronephrosis  She has mild hydronephrosis on the right side on her imaging.  She had hydronephrosis during her pregnancy and required stent placement.  I am concerned that she has not returned to normal and I recommended follow-up with urology.

## 2024-06-05 LAB
EXTERNAL ABO GROUPING: NORMAL
EXTERNAL ANTIBODY SCREEN: NORMAL
EXTERNAL CHLAMYDIA SCREEN: NEGATIVE
EXTERNAL GONORRHEA SCREEN: NEGATIVE
EXTERNAL HEMATOCRIT: 38.4 %
EXTERNAL HEMOGLOBIN: 12.8 G/DL
EXTERNAL HEPATITIS B SURFACE ANTIGEN: NEGATIVE
EXTERNAL HIV-1/2 AB-AG: NORMAL
EXTERNAL PLATELET COUNT: 480 K/ΜL
EXTERNAL RH FACTOR: POSITIVE
EXTERNAL RUBELLA IGG QUANTITATION: NORMAL
EXTERNAL SYPHILIS TOTAL IGG/IGM SCREENING: NONREACTIVE
HCV AB SER-ACNC: NONREACTIVE

## 2024-06-12 ENCOUNTER — TELEPHONE (OUTPATIENT)
Dept: OBGYN CLINIC | Facility: CLINIC | Age: 34
End: 2024-06-12

## 2024-06-12 NOTE — TELEPHONE ENCOUNTER
Relocating to the [facility] after 7/10. She is currently 11 weeks now based on LMP 3/25, is being cared for by Leann CHUNG and her last visit with them will be 7/11. Genetic and all prenatal labs, 2 Ultrasounds/Dating & Viability have been completed. Last Pap was done in 2023. Prior normal pregnancy/vaginal delivery 10/2022 with pre-eclampsia.

## 2024-06-27 ENCOUNTER — TELEPHONE (OUTPATIENT)
Dept: OBGYN CLINIC | Facility: CLINIC | Age: 34
End: 2024-06-27

## 2024-06-27 NOTE — TELEPHONE ENCOUNTER
Pt has an upcoming OB appointment scheduled on 7/31/24, spoke with patient to please bring any prior OB records or have forwarded to  the office after her OB palomo't on 7/11/24.  PN labs received from [facility] scanned into chart however unable to read some lab values. Requested to bring a copy to her upcoming appointment.

## 2024-07-26 NOTE — PROGRESS NOTES
China Scott 1990  OB INTAKE INTERVIEW  Patient is 34 y.o. who presents for OB intake at 18.2wks  She is accompanied by herself  during this encounter  The father of her baby (Ced) is involved in the pregnancy and is 37 years old.      Last Menstrual Period: 2024 (exact)  Menstrual cycles are regular at every 28 days.    Ultrasound showed SLIUP -corresponds with LMP, FHT detected. Measured 7 weeks 2 days on 05/15/2024.  Seen on 24 for vaginal bleeding-  Estimated Date of Delivery: 2024     Signs/Symptoms of Pregnancy  Current pregnancy symptoms:   Nausea in waves, not frequent: took Diclegis in first trimester.   Constipation YES, taking Colace as needed.   Headaches YES,frequent migraines-Recommend Tylenol with caffeine PRN for first line headache treatment in pregnancy. Discussed may consider initiation of Riboflavin (VIT B2) 400 mg daily with Magnesium Oxide 400 mg daily for migraine prevention. Also encourage adequate hydration.        Cramping/spotting YES, Seen for spotting at 16w2d on 24. Reports she has not had any further spotting or cramping.   Upon review 24 US showed -anterior placenta, NT wnl, CL wnl. +FHT on 24   PICA cravings no     Diabetes- There is no height or weight on file to calculate BMI.  If patient has 1 or more, please order early 1-hour GTT  History of GDM no  BMI >35 no BMI on 24-26.89  History of PCOS or current metformin use no  History of LGA/macrosomic infant (4000g/9lbs) no    If patient has 2 or more, please order early 1-hour GTT  BMI>30 no  AMA no  First degree relative with type 2 diabetes YES, her father   History of chronic HTN, hyperlipidemia, elevated A1C no  High risk race (, , ,  or ) no     Hypertension- if you answer yes to any of the following, please order baseline preeclampsia labs (cbc, comprehensive metabolic panel, urine protein creatinine ratio, uric  acid)  History of of chronic HTN no  History of gestational HTN YES, third trimester    History of preeclampsia, eclampsia, or HELLP syndrome YES, Pre-eclampsia   History of diabetes no  History of lupus, autoimmune disease, kidney disease no     Thyroid- if yes order TSH with reflex T4  History of thyroid disease YES   2024-TSH-2.970 and T4-1.0  7/3/24-  TSH-3.150 and T4 1.12    Bleeding Disorder or Hx of DVT-patient or first degree relative with history of. Order the following if not done previously.  (Factor V, antithrombin III, prothrombin gene mutation, protein C and S Ag, lupus anticoagulant, anticardiolipin, beta-2 glycoprotein)  YES, ITP , splenectomy     Discussed with Dr. Mendoza to confirm no additional blood ork is needed.   OB/GYN-  History of abnormal pap smear no   Date of last pap smear 2023-NILM/Negative HPV  History of HPV no  History of Herpes/HSV no  History of other STI (gonorrhea, chlamydia, trich) no  History of prior  YES  History of prior  no  History of  delivery prior to 36 weeks 6 days no  History of blood transfusion no  (Had immuno globin for ITPi in )   Ok for blood transfusion YES     Substance screening-   History of tobacco use no  Currently using tobacco no  Substance Use Screen Level No Risk      MRSA Screening-   Does the pt have a hx of MRSA? no     Immunizations:  Influenza vaccine given this season No  Discussed Tdap vaccine yes  Discussed COVID Vaccine yes     Genetic/MFM-  Do you or your partner have a history of any of the following in yourselves or first degree relatives?  Cystic fibrosis no  Spinal muscular atrophy no  Hemoglobinopathy/Sickle Cell/Thalassemia no  Fragile X Intellectual Disability no     If yes, discuss Carrier Screening and recommend consultation with MFM/Genetic Counseling and place specific MFM Referral for.     If no, discuss Carrier Screening being completed once in a lifetime as a standard of care lab test.  Place orders for Cystic Fibrosis Gene Test ([id number]) and Spinal Muscular Atrophy DNA ([id number])  4/13/22-CF SMA carrier screening done-Both Negative     Appointment for Nuchal Translucency Ultrasound at [facility] scheduled for            Interview education  [facility] Pregnancy Essentials Book reviewed, discussed and attached to their AVS yes        Ambulatory Referral to  placed  EPDS: 8 Has been on and off SSI during college, stopped all medication with last pregnancy/. Took Klonopin as needed.   Has 2 yrs old at home and recently relocated to the area.       Prenatal lab work scripts  6/5/24- Materna 21-negative   7/13/24-MSAFP screen-negative   6/05/24 PN blood work:  HBSAG screen -Negative  Hep B surface AB, qual: reactive   HCV-NR  Rubella-immune  Blood type B positive  HIV-negative  RPR-non reactive  HH-12.8/38.4  WBC-13.0  Plt 480(H)  CMP- Create 0.52, Na 132, AST-24, ALT- 12  Prot-Creat Urine (random)- Create urine-8.9, Protein <4.0  Prot/Create ratio -Abnormal (dilute specimen)  Urine culture-no growth  6/5/24-GC/Chlamydia-negative.   TSH-2.970  T4-1.0  7/13/24-  TSH-3.150  T4 1.12         Preferred Lab: [facility]   Extra labs ordered:       Aspirin/Preeclampsia Screen     Risk Level Risk Factor Recommendation   LOW Prior Uncomplicated full-term delivery no No Aspirin recommendation           MODERATE Nulliparity no Recommend low-dose aspirin if      BMI>30 no 2 or more moderate risk factors     Family History Preeclampsia (mother/sister) no       35yr old or greater Pt will be 36 yo on 1/10/2025       Black Race, Concern for SDOH/Low Socioeconomic no       IVF Pregnancy no       Personal History Risks (low birth weight, prior adverse preg outcome, >10yr preg interval) no             HIGH History of Preeclampsia YES Recommend low-dose aspirin if      Multifetal gestation no 1 or more high risk factors     Chronic HTN no       Type 1 or 2 Diabetes no       Renal Disease no       Autoimmune Disease no         Contraindications to ASA therapy:  NSAID/ ASA allergy: no  Nasal polyps: no  Asthma with history of ASA induced bronchospasm: no  Relative contraindications:  History of GI bleed: no  Active peptic ulcer disease: no  Severe hepatic dysfunction: no     Patient should be recommended to take ASA 162mg during this pregnancy from 12-36wks to lower her risk of preeclampsia:   High Risk-history of pre-eclampsia, has been taking low dose ASA 81 mg daily. Please review for appropriate dosing of 162 mg ASA daily as indicated.        The patient has a history now or in prior pregnancy notable for:   See Below         Details that I feel the provider should be aware of:   China is an 18 week OB transfer from [facility]. She desires to establish care with [facility]. Her prior pregnancy history includes one full term vaginal delivery that was complicated by GHTN in third trimester, PP Pre-eclampsia, placed on mag drip and MAB in 2021, requiring medical intervention with D&E.   GHTN-Third trimester   History of PP Pre-eclampsia -placed on Procardia 6 weeks PP. -Baseline CMP, Urine Prot/create random done 6/5/24   Seen for spotting at 16w2d on 7/17/24 Upon review 6/19/24 US showed -anterior placenta, NT wnl, CL wnl. +FHT on 7/17/24   Hydronephrosis right Kidney 7//2022-at 20 weeks pregnancy      Past medical history includes:  Thyroid disease-Currently on Synthroid 75 mcg daily. (During pregnancy only) Managed by OB during pregnancy.  Dose recently  increased from 50 mcg to 75 mcg 7/13/24   Anxiety-History of medical management with Prozac -Does not like to take medication with pregnancy.    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 Discussed may consider initiation of Riboflavin (VIT B2) 400 mg daily with Magnesium Oxide 400 mg daily for migraine prevention. Also encourage adequate hydration.           PN1 visit scheduled. The patient was oriented to our practice, the navigator role, reviewed delivering physicians and [facility] for Delivery. All questions were answered.    Interviewed by: SANJAY Aguilar RN

## 2024-07-31 ENCOUNTER — INITIAL PRENATAL (OUTPATIENT)
Dept: OBGYN CLINIC | Facility: CLINIC | Age: 34
End: 2024-07-31

## 2024-07-31 ENCOUNTER — PATIENT OUTREACH (OUTPATIENT)
Dept: OBGYN CLINIC | Facility: CLINIC | Age: 34
End: 2024-07-31

## 2024-07-31 ENCOUNTER — INITIAL PRENATAL (OUTPATIENT)
Dept: OBGYN CLINIC | Facility: CLINIC | Age: 34
End: 2024-07-31
Payer: COMMERCIAL

## 2024-07-31 VITALS
DIASTOLIC BLOOD PRESSURE: 60 MMHG | WEIGHT: 147 LBS | SYSTOLIC BLOOD PRESSURE: 90 MMHG | BODY MASS INDEX: 28.86 KG/M2 | HEIGHT: 60 IN

## 2024-07-31 VITALS
BODY MASS INDEX: 28.86 KG/M2 | SYSTOLIC BLOOD PRESSURE: 90 MMHG | WEIGHT: 147 LBS | DIASTOLIC BLOOD PRESSURE: 60 MMHG | HEIGHT: 60 IN

## 2024-07-31 DIAGNOSIS — O99.282 HYPOTHYROID IN PREGNANCY, ANTEPARTUM, SECOND TRIMESTER: Primary | ICD-10-CM

## 2024-07-31 DIAGNOSIS — E03.9 HYPOTHYROID IN PREGNANCY, ANTEPARTUM, SECOND TRIMESTER: Primary | ICD-10-CM

## 2024-07-31 DIAGNOSIS — Z3A.18 18 WEEKS GESTATION OF PREGNANCY: ICD-10-CM

## 2024-07-31 DIAGNOSIS — Z36.89 ENCOUNTER FOR OTHER SPECIFIED ANTENATAL SCREENING: ICD-10-CM

## 2024-07-31 DIAGNOSIS — Z3A.18 18 WEEKS GESTATION OF PREGNANCY: Primary | ICD-10-CM

## 2024-07-31 DIAGNOSIS — Z87.59 HISTORY OF SEVERE PRE-ECLAMPSIA: ICD-10-CM

## 2024-07-31 LAB
SL AMB  POCT GLUCOSE, UA: NORMAL
SL AMB POCT URINE PROTEIN: NORMAL

## 2024-07-31 PROCEDURE — PNV: Performed by: OBSTETRICS & GYNECOLOGY

## 2024-07-31 PROCEDURE — 81002 URINALYSIS NONAUTO W/O SCOPE: CPT | Performed by: OBSTETRICS & GYNECOLOGY

## 2024-07-31 RX ORDER — FAMOTIDINE 20 MG/1
20 TABLET, FILM COATED ORAL 2 TIMES DAILY
COMMUNITY

## 2024-07-31 RX ORDER — ASPIRIN 81 MG/1
81 TABLET, CHEWABLE ORAL DAILY
COMMUNITY

## 2024-07-31 RX ORDER — LEVOTHYROXINE SODIUM 0.07 MG/1
75 TABLET ORAL DAILY
COMMUNITY
Start: 2024-07-15 | End: 2025-07-15

## 2024-07-31 RX ORDER — CHOLECALCIFEROL (VITAMIN D3) 25 MCG
TABLET,CHEWABLE ORAL
COMMUNITY

## 2024-07-31 NOTE — PATIENT INSTRUCTIONS
Congratulations!! Please review our Pregnancy Essential Guide and [facility] L&D Virtual tour from our networks website.     [facility] Pregnancy Essentials Guide  [facility] ([email or web address])     [facility] ([place])

## 2024-07-31 NOTE — PROGRESS NOTES
SW referred for initial prenatal assessment. Patient is a 52h4uRC transfer with an MARIALUISA of 12/30/24. She is agreeable to meeting with SW today.    Patient reports this is a planned pregnancy, she is expecting a girl. Patient and FOB ( Ced) both work and drive. Patient denies needing information for MA/WIC/SNAP, parenting education, or baby supply resources today. Patient denies current or h/o substance use, DV/IPV, CYS involvement, and legal concerns.     Reports h/o depression and anxiety - has felt low this pregnancy, but states she has been under more stress recently. She feels more anxious/worried than depressed. Patient sees a therapist once every 4-6 weeks, but could see her more often if needed. Has been on/off medication in the past, unsure if she would like to start during/after pregnancy. SW encouraged her to discuss this with the provider if ever interested. Indicates good support from FOB, family, and friends.     No additional SW needs identified today, SW to f/u with patient in 3 months.

## 2024-08-01 ENCOUNTER — TELEPHONE (OUTPATIENT)
Age: 34
End: 2024-08-01

## 2024-08-01 NOTE — TELEPHONE ENCOUNTER
----- Message from Keegan Osorio MD sent at 8/1/2024  9:59 AM EDT -----  Regarding: RE: 18 wk transfer of care from [facility]  Gary Reich,    She needs a detailed US with us in 1 to 4 weeks.    Thanks,    Vel  ----- Message -----  From: Rachana Hoyos  Sent: 8/1/2024   9:40 AM EDT  To: Keegan sOorio MD  Subject: 18 wk transfer of care from [facility]     Good morning Dr. Osorio,    We have an 18 wk transfer of care patient from [facility].    Thank you    Rachana

## 2024-08-02 ENCOUNTER — TELEPHONE (OUTPATIENT)
Age: 34
End: 2024-08-02

## 2024-08-21 ENCOUNTER — ROUTINE PRENATAL (OUTPATIENT)
Facility: HOSPITAL | Age: 34
End: 2024-08-21
Attending: OBSTETRICS & GYNECOLOGY
Payer: COMMERCIAL

## 2024-08-21 VITALS
BODY MASS INDEX: 30.39 KG/M2 | HEIGHT: 60 IN | SYSTOLIC BLOOD PRESSURE: 98 MMHG | HEART RATE: 82 BPM | WEIGHT: 154.8 LBS | DIASTOLIC BLOOD PRESSURE: 62 MMHG

## 2024-08-21 DIAGNOSIS — O99.282 HYPOTHYROID IN PREGNANCY, ANTEPARTUM, SECOND TRIMESTER: ICD-10-CM

## 2024-08-21 DIAGNOSIS — E03.9 HYPOTHYROID IN PREGNANCY, ANTEPARTUM, SECOND TRIMESTER: ICD-10-CM

## 2024-08-21 DIAGNOSIS — Z3A.21 21 WEEKS GESTATION OF PREGNANCY: Primary | ICD-10-CM

## 2024-08-21 DIAGNOSIS — Z36.86 ENCOUNTER FOR ANTENATAL SCREENING FOR CERVICAL LENGTH: ICD-10-CM

## 2024-08-21 DIAGNOSIS — Z36.3 ENCOUNTER FOR ANTENATAL SCREENING FOR MALFORMATION USING ULTRASOUND: ICD-10-CM

## 2024-08-21 DIAGNOSIS — Z36.89 ENCOUNTER FOR OTHER SPECIFIED ANTENATAL SCREENING: ICD-10-CM

## 2024-08-21 PROCEDURE — 76817 TRANSVAGINAL US OBSTETRIC: CPT | Performed by: OBSTETRICS & GYNECOLOGY

## 2024-08-21 PROCEDURE — 76805 OB US >/= 14 WKS SNGL FETUS: CPT | Performed by: OBSTETRICS & GYNECOLOGY

## 2024-08-21 PROCEDURE — 99203 OFFICE O/P NEW LOW 30 MIN: CPT | Performed by: OBSTETRICS & GYNECOLOGY

## 2024-08-21 NOTE — PROGRESS NOTES
Ultrasound Probe Disinfection    A transvaginal ultrasound was performed.   Prior to use, disinfection was performed with High Level Disinfection Process (Refurrlon).  Probe serial number [id number] was used.    Viv Cunha  08/21/24  2:35 PM

## 2024-08-21 NOTE — PROGRESS NOTES
On exam today, the patient appears well, in no acute distress, and denies any complaints. China recently transferred from [place].  She has a history of a prior term vaginal delivery complicated by preeclampsia.  She is currently taking low-dose aspirin.  She has a history of hypothyroidism currently on levothyroxine.  There is a history of depression and anxiety not currently requiring medications.  She has a remote history of ITP status post a splenectomy in 2010.  She had a cholecystectomy in January.  There is a family history of diabetes and hypertension.  A review of systems is otherwise negative.    I reviewed the patient's genetic screening.  The patient had noninvasive prenatal testing through [facility] using the [id number] plus test.  Her results were normal, placing her in a very low risk category.  The sensitivity of detecting Trisomy 21 with this test is 99.1% with a specificity of 99.9%.  The sensitivity of detecting Trisomy 18 is >99.9% with a specificity of 99.6%.  The sensitivity of detecting Trisomy 13 is 91.7% with a specificity of 99.7%.  Her negative result is very reassuring that the likelihood of her having a fetus with the aforementioned Trisomies is very low.    The fetal anatomic survey is complete. There is no sonographic evidence of fetal abnormalities at this time. Good fetal movement and tone are seen. The amniotic fluid volume appears normal. A transvaginal ultrasound was performed to assess the cervix, which was not seen well transabdominally. The cervical length was 3.28 centimeters, which is normal for the current gestational age. There was no significant funneling or dynamic changes appreciated. The patient was informed of today's findings and all of her questions were answered. The limitations of ultrasound were reviewed.    The patient and I discussed her current state of hypothyroidism with a TSH level of 3.15 in July.  She had her Synthroid adjusted from 50 mcg to  75 at that time.. I recommend repeating her TSH within the next week or so and adjusting her Synthroid dose to a TSH level with the trimester specific goal in (mU/ML) as follows: First trimester 0.1 to 2.5, Second trimester 0.2 to 3.0, Third trimester 0.3 to 3.0.. I also recommend drawing a TSH level every 4-6 weeks and adjusting her Synthroid dose accordingly as pregnancy can significantly increase your requirements for thyroid hormones especially during the second trimester. As long as the patient's hypothyroidism is well controlled, she should not have any significant increased risk of adverse pregnancy outcome. Poorly controlled maternal hypothyroidism does place the pregnancy at risk for preeclampsia, placental abruption, nonreassuring fetal heart rate,  birth, low birthweight,  morbidity and mortality, neuropsychological and cognitive impairment, and postpartum hemorrhage. We discussed these risks and the importance of medication adherence to achieve euthyroidism. As long as the patient's hypothyroidism is well controlled and no issues arise during the pregnancy, no deviation from normal labor and delivery is required.    We discussed follow-up in detail, and I recommend she return in 10 to 12 weeks to assess fetal growth.    Thank you very much for allowing us to participate in the care of this very nice patient. Should you have any questions, please do not hesitate to contact our office.

## 2024-08-24 LAB — TSH SERPL DL<=0.005 MIU/L-ACNC: 2.45 UIU/ML (ref 0.45–4.5)

## 2024-08-28 ENCOUNTER — ROUTINE PRENATAL (OUTPATIENT)
Dept: OBGYN CLINIC | Facility: CLINIC | Age: 34
End: 2024-08-28
Payer: COMMERCIAL

## 2024-08-28 VITALS
DIASTOLIC BLOOD PRESSURE: 58 MMHG | SYSTOLIC BLOOD PRESSURE: 98 MMHG | BODY MASS INDEX: 30.43 KG/M2 | WEIGHT: 155 LBS | HEIGHT: 60 IN

## 2024-08-28 DIAGNOSIS — E03.9 HYPOTHYROID IN PREGNANCY, ANTEPARTUM, SECOND TRIMESTER: Primary | ICD-10-CM

## 2024-08-28 DIAGNOSIS — F32.A DEPRESSION AFFECTING PREGNANCY IN SECOND TRIMESTER, ANTEPARTUM: ICD-10-CM

## 2024-08-28 DIAGNOSIS — O99.342 DEPRESSION AFFECTING PREGNANCY IN SECOND TRIMESTER, ANTEPARTUM: ICD-10-CM

## 2024-08-28 DIAGNOSIS — Z3A.22 22 WEEKS GESTATION OF PREGNANCY: ICD-10-CM

## 2024-08-28 DIAGNOSIS — O99.282 HYPOTHYROID IN PREGNANCY, ANTEPARTUM, SECOND TRIMESTER: Primary | ICD-10-CM

## 2024-08-28 LAB
SL AMB  POCT GLUCOSE, UA: NORMAL
SL AMB POCT URINE PROTEIN: NORMAL

## 2024-08-28 PROCEDURE — PNV: Performed by: OBSTETRICS & GYNECOLOGY

## 2024-08-28 PROCEDURE — 81002 URINALYSIS NONAUTO W/O SCOPE: CPT | Performed by: OBSTETRICS & GYNECOLOGY

## 2024-08-28 RX ORDER — DOXYLAMINE SUCCINATE AND PYRIDOXINE HYDROCHLORIDE, DELAYED RELEASE TABLETS 10 MG/10 MG 10; 10 MG/1; MG/1
TABLET, DELAYED RELEASE ORAL
COMMUNITY
Start: 2024-05-23

## 2024-08-28 RX ORDER — FLUOXETINE 10 MG/1
10 CAPSULE ORAL DAILY
Qty: 30 CAPSULE | Refills: 6 | Status: SHIPPED | OUTPATIENT
Start: 2024-08-28

## 2024-08-28 RX ORDER — CLONAZEPAM 0.5 MG/1
0.5 TABLET ORAL
COMMUNITY

## 2024-08-28 NOTE — PROGRESS NOTES
Routine Prenatal Visit  [facility] [address], [address], [place] [address]    Assessment/Plan:  China is a 34 y.o. year old  at 22w2d who presents for routine prenatal visit.     1. Hypothyroid in pregnancy, antepartum, second trimester  2. Depression affecting pregnancy in second trimester, antepartum  Assessment & Plan:  She has been struggling with depression and non medical management has not been helping. She feels as though she is getting worse and she would like to restart prozac, which she has used in the past. She has done well previously on 10 mg so will start at that dose a monitor response.  Orders:  -     FLUoxetine (PROzac) 10 mg capsule; Take 1 capsule (10 mg total) by mouth daily  3. 22 weeks gestation of pregnancy  -     POCT urine dip        Subjective:     CC: Prenatal care    China Scott is a 34 y.o.  female who presents for routine prenatal care at 22w2d.  Pregnancy ROS: no leakage of fluid, pelvic pain, or vaginal bleeding.  normal fetal movement.    The following portions of the patient's history were reviewed and updated as appropriate: allergies, current medications, past family history, past medical history, obstetric history, gynecologic history, past social history, past surgical history and problem list.      Objective:  BP 98/58 (BP Location: Left arm, Patient Position: Sitting, Cuff Size: Standard)   Ht 5' (1.524 m)   Wt 70.3 kg (155 lb)   LMP 2024 (Exact Date)   BMI 30.27 kg/m²   Pregravid Weight/BMI: 62.1 kg (137 lb) (BMI 26.76)  Current Weight: 70.3 kg (155 lb)   Total Weight Gain: 8.165 kg (18 lb)   Pre- Vitals      Flowsheet Row Most Recent Value   Prenatal Assessment    Fetal Heart Rate 145   Fundal Height (cm) 24 cm   Movement Present   Prenatal Vitals    Blood Pressure 98/58   Weight - Scale 70.3 kg (155 lb)   Urine Albumin/Glucose    Dilation/Effacement/Station    Vaginal Drainage    Edema              General: Well  appearing, no distress  Respiratory: Unlabored breathing  Cardiovascular: Regular rate.  Abdomen: Soft, gravid, nontender  Fundal Height: Appropriate for gestational age.  Extremities: Warm and well perfused.  Non tender.

## 2024-08-30 PROBLEM — Z87.59 HISTORY OF SEVERE PRE-ECLAMPSIA: Status: ACTIVE | Noted: 2024-05-01

## 2024-08-30 PROBLEM — Z3A.18 18 WEEKS GESTATION OF PREGNANCY: Status: ACTIVE | Noted: 2024-07-31

## 2024-08-30 PROBLEM — Z87.59 HISTORY OF SEVERE PRE-ECLAMPSIA: Status: ACTIVE | Noted: 2024-07-31

## 2024-08-30 NOTE — PROGRESS NOTES
Routine Prenatal Visit  [facility] [address], [address], [place] [address]    Assessment/Plan:  China is a 34 y.o. year old  at 18w2d who presents for routine prenatal visit.     1. Hypothyroid in pregnancy, antepartum, second trimester  Assessment & Plan:  Recent TSH was 3.15 and her synthroid dose was increased from 50 to 75 mcg.   Orders:  -     TSH, 3rd generation with Free T4 reflex; Future; Expected date: 2024  -     TSH, 3rd generation with Free T4 reflex  2. History of severe pre-eclampsia  Assessment & Plan:  Tolerating ASA  3. 18 weeks gestation of pregnancy  -     POCT urine dip        Subjective:     CC: Prenatal care    China Scott is a 34 y.o.  female who presents for routine prenatal care at 18w2d.  Pregnancy ROS: no leakage of fluid, pelvic pain, or vaginal bleeding.  normal fetal movement.    The following portions of the patient's history were reviewed and updated as appropriate: allergies, current medications, past family history, past medical history, obstetric history, gynecologic history, past social history, past surgical history and problem list.      Objective:  BP 90/60 (Patient Position: Sitting, Cuff Size: Standard)   Ht 5' (1.524 m)   Wt 66.7 kg (147 lb)   LMP 2024 (Exact Date)   BMI 28.71 kg/m²   Pregravid Weight/BMI: 62.1 kg (137 lb) (BMI 26.76)  Current Weight: 66.7 kg (147 lb)   Total Weight Gain: 4.536 kg (10 lb)   Pre- Vitals      Flowsheet Row Most Recent Value   Prenatal Assessment    Fetal Heart Rate 140   Fundal Height (cm) 20 cm   Movement Present   Prenatal Vitals    Blood Pressure 90/60   Weight - Scale 66.7 kg (147 lb)   Urine Albumin/Glucose    Dilation/Effacement/Station    Vaginal Drainage    Edema              General: Well appearing, no distress  Respiratory: Unlabored breathing  Cardiovascular: Regular rate.  Abdomen: Soft, gravid, nontender  Fundal Height: Appropriate for gestational age.  Extremities:  Warm and well perfused.  Non tender.

## 2024-09-03 NOTE — ASSESSMENT & PLAN NOTE
She has been struggling with depression and non medical management has not been helping. She feels as though she is getting worse and she would like to restart prozac, which she has used in the past. She has done well previously on 10 mg so will start at that dose a monitor response.

## 2024-09-27 ENCOUNTER — ROUTINE PRENATAL (OUTPATIENT)
Dept: OBGYN CLINIC | Facility: CLINIC | Age: 34
End: 2024-09-27
Payer: COMMERCIAL

## 2024-09-27 VITALS
SYSTOLIC BLOOD PRESSURE: 110 MMHG | BODY MASS INDEX: 31.33 KG/M2 | WEIGHT: 159.6 LBS | DIASTOLIC BLOOD PRESSURE: 62 MMHG | HEIGHT: 60 IN

## 2024-09-27 DIAGNOSIS — Z87.59 HISTORY OF SEVERE PRE-ECLAMPSIA: ICD-10-CM

## 2024-09-27 DIAGNOSIS — O99.342 DEPRESSION AFFECTING PREGNANCY IN SECOND TRIMESTER, ANTEPARTUM: ICD-10-CM

## 2024-09-27 DIAGNOSIS — Z36.89 ENCOUNTER FOR OTHER SPECIFIED ANTENATAL SCREENING: ICD-10-CM

## 2024-09-27 DIAGNOSIS — O99.282 HYPOTHYROID IN PREGNANCY, ANTEPARTUM, SECOND TRIMESTER: ICD-10-CM

## 2024-09-27 DIAGNOSIS — F32.A DEPRESSION AFFECTING PREGNANCY IN SECOND TRIMESTER, ANTEPARTUM: ICD-10-CM

## 2024-09-27 DIAGNOSIS — E03.9 HYPOTHYROID IN PREGNANCY, ANTEPARTUM, SECOND TRIMESTER: ICD-10-CM

## 2024-09-27 DIAGNOSIS — Z3A.26 26 WEEKS GESTATION OF PREGNANCY: Primary | ICD-10-CM

## 2024-09-27 LAB
SL AMB  POCT GLUCOSE, UA: NORMAL
SL AMB POCT URINE PROTEIN: NORMAL

## 2024-09-27 PROCEDURE — 81002 URINALYSIS NONAUTO W/O SCOPE: CPT | Performed by: OBSTETRICS & GYNECOLOGY

## 2024-09-27 PROCEDURE — PNV: Performed by: OBSTETRICS & GYNECOLOGY

## 2024-09-28 NOTE — PROGRESS NOTES
Routine Prenatal Visit  [facility] - [address], [address], [place] [address]    Assessment/Plan:  China is a 34 y.o. year old  at 26w5d who presents for routine prenatal visit.     1. 26 weeks gestation of pregnancy  -     POCT urine dip  2. Encounter for other specified  screening  -     Glucose, 1H PG; Future  -     CBC; Future  -     RPR, Rfx Qn RPR/Confirm TP; Future  -     Glucose, 1H PG  -     CBC  -     RPR, Rfx Qn RPR/Confirm TP  3. Hypothyroid in pregnancy, antepartum, second trimester  4. Depression affecting pregnancy in second trimester, antepartum  5. History of severe pre-eclampsia      Next OB Visit 2 weeks.    Subjective:     CC: Prenatal care    China Scott is a 34 y.o.  female who presents for routine prenatal care at 26w5d.  Pregnancy ROS: no leakage of fluid, pelvic pain, or vaginal bleeding.  normal fetal movement.    The following portions of the patient's history were reviewed and updated as appropriate: allergies, current medications, past family history, past medical history, obstetric history, gynecologic history, past social history, past surgical history and problem list.      Objective:  /62 (BP Location: Right arm, Patient Position: Sitting, Cuff Size: Standard)   Ht 5' (1.524 m)   Wt 72.4 kg (159 lb 9.6 oz)   LMP 2024 (Exact Date)   BMI 31.17 kg/m²   Pregravid Weight/BMI: 62.1 kg (137 lb) (BMI 26.76)  Current Weight: 72.4 kg (159 lb 9.6 oz)   Total Weight Gain: 10.3 kg (22 lb 9.6 oz)   Pre-Sherin Vitals      Flowsheet Row Most Recent Value   Prenatal Assessment    Fetal Heart Rate 150   Fundal Height (cm) 27 cm   Movement Present   Prenatal Vitals    Blood Pressure 110/62   Weight - Scale 72.4 kg (159 lb 9.6 oz)   Urine Albumin/Glucose    Dilation/Effacement/Station    Vaginal Drainage    Draining Fluid No   Edema              General: Well appearing, no distress  Abdomen: Soft, gravid, nontender  Extremities: Non  tender.

## 2024-10-01 DIAGNOSIS — E03.9 HYPOTHYROID IN PREGNANCY, ANTEPARTUM, SECOND TRIMESTER: Primary | ICD-10-CM

## 2024-10-01 DIAGNOSIS — O99.282 HYPOTHYROID IN PREGNANCY, ANTEPARTUM, SECOND TRIMESTER: Primary | ICD-10-CM

## 2024-10-09 LAB
EXTERNAL HEMATOCRIT: 32.7 %
EXTERNAL HEMOGLOBIN: 10.9 G/DL
EXTERNAL PLATELET COUNT: 444 K/ΜL
EXTERNAL SYPHILIS TOTAL IGG/IGM SCREENING: NEGATIVE

## 2024-10-10 LAB — TSH SERPL DL<=0.005 MIU/L-ACNC: 1.11 UIU/ML (ref 0.45–4.5)

## 2024-10-11 ENCOUNTER — ROUTINE PRENATAL (OUTPATIENT)
Dept: OBGYN CLINIC | Facility: CLINIC | Age: 34
End: 2024-10-11
Payer: COMMERCIAL

## 2024-10-11 VITALS
WEIGHT: 163 LBS | SYSTOLIC BLOOD PRESSURE: 110 MMHG | DIASTOLIC BLOOD PRESSURE: 56 MMHG | HEIGHT: 60 IN | BODY MASS INDEX: 32 KG/M2

## 2024-10-11 DIAGNOSIS — Z3A.28 28 WEEKS GESTATION OF PREGNANCY: ICD-10-CM

## 2024-10-11 DIAGNOSIS — E03.9 HYPOTHYROIDISM COMPLICATING PREGNANCY, THIRD TRIMESTER: Primary | ICD-10-CM

## 2024-10-11 DIAGNOSIS — Z3A.18 18 WEEKS GESTATION OF PREGNANCY: ICD-10-CM

## 2024-10-11 DIAGNOSIS — Z87.59 HISTORY OF SEVERE PRE-ECLAMPSIA: ICD-10-CM

## 2024-10-11 DIAGNOSIS — O99.283 HYPOTHYROIDISM COMPLICATING PREGNANCY, THIRD TRIMESTER: Primary | ICD-10-CM

## 2024-10-11 DIAGNOSIS — O99.343 DEPRESSION AFFECTING PREGNANCY IN THIRD TRIMESTER, ANTEPARTUM: ICD-10-CM

## 2024-10-11 DIAGNOSIS — F32.A DEPRESSION AFFECTING PREGNANCY IN THIRD TRIMESTER, ANTEPARTUM: ICD-10-CM

## 2024-10-11 LAB
ERYTHROCYTE [DISTWIDTH] IN BLOOD BY AUTOMATED COUNT: 12.1 % (ref 11.7–15.4)
GLUCOSE 1H P 50 G GLC PO SERPL-MCNC: 93 MG/DL (ref 70–139)
HCT VFR BLD AUTO: 32.7 % (ref 34–46.6)
HGB BLD-MCNC: 10.9 G/DL (ref 11.1–15.9)
MCH RBC QN AUTO: 29.9 PG (ref 26.6–33)
MCHC RBC AUTO-ENTMCNC: 33.3 G/DL (ref 31.5–35.7)
MCV RBC AUTO: 90 FL (ref 79–97)
PLATELET # BLD AUTO: 444 X10E3/UL (ref 150–450)
RBC # BLD AUTO: 3.64 X10E6/UL (ref 3.77–5.28)
RPR SER QL: NON REACTIVE
SL AMB  POCT GLUCOSE, UA: NORMAL
SL AMB POCT URINE PROTEIN: NORMAL
WBC # BLD AUTO: 13.6 X10E3/UL (ref 3.4–10.8)

## 2024-10-11 PROCEDURE — PNV: Performed by: PHYSICIAN ASSISTANT

## 2024-10-11 PROCEDURE — 81002 URINALYSIS NONAUTO W/O SCOPE: CPT | Performed by: PHYSICIAN ASSISTANT

## 2024-10-11 NOTE — ASSESSMENT & PLAN NOTE
Reviewed 28 weeks labs. Results console updated.   Reviewed weight gain in pregnancy. Reviewed dietary modifications. Continue to monitor. Reviewed option of nutritionist if worsening.   Reviewed Tdap, Flu, and Maternal vs infant RSV vaccine. Patient will consider.   Continue routine prenatal care.   Return to office for ob check in 2 weeks.

## 2024-10-11 NOTE — ASSESSMENT & PLAN NOTE
Continue Prozac 10mg daily.   Reports follows with therapist for the last 6 years in [facility] area. Has been difficult to get in with her. Reviewed option of [facility] therapist if needed.

## 2024-10-11 NOTE — PROGRESS NOTES
Routine Prenatal Visit  [facility] - [address], [address], [place] [address]    Assessment/Plan:  China is a 34 y.o. year old  at 28w4d who presents for routine prenatal visit.     1. Hypothyroidism complicating pregnancy, third trimester  Assessment & Plan:  Recent TFTs normal. Will continue current dose of Synthroid.   2. 28 weeks gestation of pregnancy  Assessment & Plan:  Reviewed 28 weeks labs. Results console updated.   Reviewed weight gain in pregnancy. Reviewed dietary modifications. Continue to monitor. Reviewed option of nutritionist if worsening.   Reviewed Tdap, Flu, and Maternal vs infant RSV vaccine. Patient will consider.   Continue routine prenatal care.   Return to office for ob check in 2 weeks.     Orders:  -     POCT urine dip  3. Depression affecting pregnancy in third trimester, antepartum  Assessment & Plan:  Continue Prozac 10mg daily.   Reports follows with therapist for the last 6 years in [facility]. Has been difficult to get in with her. Reviewed option of [facility] therapist if needed.   4. History of severe pre-eclampsia  Assessment & Plan:  Continue  mg until 36 weeks.   Continue to monitor swelling. Aware of s/s of preeclampsia to call with.   Consider home BP monitoring. Reassured normal BP today and urine dip negative.   5. Transfer @ 18 weeks      Next OB Visit 2 weeks.    Subjective:     CC: Prenatal care    China Scott is a 34 y.o.  female who presents for routine prenatal care at 28w4d.  Pregnancy ROS: Good FM, some nausea. Reports pelvic discomfort, no contractions. Noticing some swelling in the last week. Mild headaches not out of the ordinary for her. No visual changes. Concerns about weight gain. No VB, LOF, domestic violence, or smoking. Tolerating PNV.        The following portions of the patient's history were reviewed and updated as appropriate: allergies, current medications, past family history, past medical history,  obstetric history, gynecologic history, past social history, past surgical history and problem list.      Objective:  /56 (BP Location: Right arm, Patient Position: Sitting, Cuff Size: Standard)   Ht 5' (1.524 m)   Wt 73.9 kg (163 lb)   LMP 2024 (Exact Date)   BMI 31.83 kg/m²   Pregravid Weight/BMI: 55.3 kg (122 lb) (BMI 23.83)  Current Weight: 73.9 kg (163 lb)   Total Weight Gain: 18.6 kg (41 lb)   Pre-Sherin Vitals      Flowsheet Row Most Recent Value   Prenatal Assessment    Fetal Heart Rate 145   Fundal Height (cm) 28 cm   Movement Present   Prenatal Vitals    Blood Pressure 110/56   Weight - Scale 73.9 kg (163 lb)   Urine Albumin/Glucose    Dilation/Effacement/Station    Vaginal Drainage    Edema    LLE Edema None   RLE Edema None   Facial Edema None             General: Well appearing, no distress  Abdomen: Soft, gravid, nontender  Fundal Height: Appropriate for gestational age.

## 2024-10-11 NOTE — ASSESSMENT & PLAN NOTE
Continue  mg until 36 weeks.   Continue to monitor swelling. Aware of s/s of preeclampsia to call with.   Consider home BP monitoring. Reassured normal BP today and urine dip negative.

## 2024-10-16 PROBLEM — D50.8 IRON DEFICIENCY ANEMIA SECONDARY TO INADEQUATE DIETARY IRON INTAKE: Status: ACTIVE | Noted: 2024-10-16

## 2024-10-20 ENCOUNTER — NURSE TRIAGE (OUTPATIENT)
Dept: OTHER | Facility: OTHER | Age: 34
End: 2024-10-20

## 2024-10-20 NOTE — TELEPHONE ENCOUNTER
"  Reason for Disposition  • Patient sounds very sick or weak to the triager    Answer Assessment - Initial Assessment Questions  1. DESCRIPTION: \"How has your vision changed?\" (e.g., complete vision loss, blurred vision, double vision, floaters, etc.)      Black spotty vision last night- last for a couple minutes, none since     2. LOCATION: \"One or both eyes?\" If one, ask: \"Which eye?\"      Both eyes     3. SEVERITY: \"Can you see anything?\" If Yes, ask: \"What can you see?\" (e.g., fine print)      Mild- not present now     4. ONSET: \"When did this begin?\" \"Did it start suddenly (minutes, hours) or has this been gradual (weeks, months)?\"      Last night- not present now     5. PATTERN: \"Does this come and go, or has it been constant since it started?\"      Gone now     6. PAIN: \"Is there any pain in your eye(s)?\"  (Scale 1-10; or mild, moderate, severe)      Denies     7. CONTACTS-GLASSES: \"Do you wear contacts or glasses?\"      Denies     8. CAUSE: \"What do you think is causing this visual problem?\"      History of Pre-Eclampsia     9. OTHER SYMPTOMS: \"Do you have any other symptoms?\" (e.g., arm or leg weakness, dry eyes, headache)      Mild swelling in hands and feet      Numbness and tingling in arms, and legs- for the last hour, seems more present in right arm and foot       Increased fatigue       Decreased appetite    10. PREGNANCY: \"How many weeks pregnant are you?\"        29w6d    11. MARIALUISA: \"What date are you expecting to deliver?\"        12/30/24    Protocols used: Pregnancy - Vision Loss or Change-Adult-AH    "

## 2024-10-20 NOTE — TELEPHONE ENCOUNTER
Due to numbness/tingling that is present more so on her right side, recommended patient be evaluated in the ED at this time. Patient stated she is currently out of town but was going to be driving home from [place] now with her family, stated it will take about 2 hours to get home. She would prefer to be seen at the [facility] when they arrived home. On call provider contacted, recommends the patient goes to the nearest ED to her now for evaluation. Patient made aware and verbalized understanding.    Admitted

## 2024-10-21 ENCOUNTER — APPOINTMENT (OUTPATIENT)
Dept: MRI IMAGING | Facility: HOSPITAL | Age: 34
End: 2024-10-21
Payer: COMMERCIAL

## 2024-10-21 ENCOUNTER — HOSPITAL ENCOUNTER (OUTPATIENT)
Facility: HOSPITAL | Age: 34
Setting detail: OBSERVATION
Discharge: HOME | End: 2024-10-22
Attending: EMERGENCY MEDICINE | Admitting: OBSTETRICS & GYNECOLOGY
Payer: COMMERCIAL

## 2024-10-21 DIAGNOSIS — R20.2 HAND TINGLING: Primary | ICD-10-CM

## 2024-10-21 DIAGNOSIS — R20.0 NUMBNESS AND TINGLING: ICD-10-CM

## 2024-10-21 DIAGNOSIS — Z3A.30 30 WEEKS GESTATION OF PREGNANCY: ICD-10-CM

## 2024-10-21 DIAGNOSIS — R42 LIGHTHEADEDNESS: ICD-10-CM

## 2024-10-21 DIAGNOSIS — R20.2 NUMBNESS AND TINGLING: ICD-10-CM

## 2024-10-21 DIAGNOSIS — R51.9 HEADACHE: ICD-10-CM

## 2024-10-21 LAB
ALBUMIN SERPL BCG-MCNC: 3.3 G/DL (ref 3.5–5)
ALP SERPL-CCNC: 79 U/L (ref 34–104)
ALT SERPL W P-5'-P-CCNC: 8 U/L (ref 7–52)
ALT SERPL W P-5'-P-CCNC: 8 U/L (ref 7–52)
ANION GAP SERPL CALCULATED.3IONS-SCNC: 7 MMOL/L (ref 4–13)
APTT PPP: 25 SECONDS (ref 23–34)
AST SERPL W P-5'-P-CCNC: 19 U/L (ref 13–39)
ATRIAL RATE: 83 BPM
BACTERIA UR QL AUTO: NORMAL /HPF
BASOPHILS # BLD AUTO: 0.1 THOUSANDS/ΜL (ref 0–0.1)
BASOPHILS NFR BLD AUTO: 1 % (ref 0–1)
BILIRUB SERPL-MCNC: 0.27 MG/DL (ref 0.2–1)
BILIRUB UR QL STRIP: NEGATIVE
BUN SERPL-MCNC: 8 MG/DL (ref 5–25)
CALCIUM ALBUM COR SERPL-MCNC: 9 MG/DL (ref 8.3–10.1)
CALCIUM SERPL-MCNC: 8.4 MG/DL (ref 8.4–10.2)
CHLORIDE SERPL-SCNC: 103 MMOL/L (ref 96–108)
CLARITY UR: CLEAR
CO2 SERPL-SCNC: 24 MMOL/L (ref 21–32)
COLOR UR: YELLOW
CREAT SERPL-MCNC: 0.44 MG/DL (ref 0.6–1.3)
CREAT UR-MCNC: 55.2 MG/DL
EOSINOPHIL # BLD AUTO: 0.18 THOUSAND/ΜL (ref 0–0.61)
EOSINOPHIL NFR BLD AUTO: 1 % (ref 0–6)
ERYTHROCYTE [DISTWIDTH] IN BLOOD BY AUTOMATED COUNT: 13.9 % (ref 11.6–15.1)
GFR SERPL CREATININE-BSD FRML MDRD: 132 ML/MIN/1.73SQ M
GLUCOSE SERPL-MCNC: 94 MG/DL (ref 65–140)
GLUCOSE UR STRIP-MCNC: NEGATIVE MG/DL
HBV SURFACE AB SER-ACNC: 65.2 MIU/ML
HBV SURFACE AG SER QL: NORMAL
HCT VFR BLD AUTO: 35.9 % (ref 34.8–46.1)
HCV AB SER QL: NORMAL
HGB BLD-MCNC: 11.5 G/DL (ref 11.5–15.4)
HGB UR QL STRIP.AUTO: NEGATIVE
HIV 1+2 AB+HIV1 P24 AG SERPL QL IA: NORMAL
HIV 2 AB SERPL QL IA: NORMAL
HIV1 AB SERPL QL IA: NORMAL
HIV1 P24 AG SERPL QL IA: NORMAL
IMM GRANULOCYTES # BLD AUTO: 0.09 THOUSAND/UL (ref 0–0.2)
IMM GRANULOCYTES NFR BLD AUTO: 1 % (ref 0–2)
INR PPP: 1.01 (ref 0.85–1.19)
KETONES UR STRIP-MCNC: NEGATIVE MG/DL
LEUKOCYTE ESTERASE UR QL STRIP: ABNORMAL
LYMPHOCYTES # BLD AUTO: 3.02 THOUSANDS/ΜL (ref 0.6–4.47)
LYMPHOCYTES NFR BLD AUTO: 22 % (ref 14–44)
MAGNESIUM SERPL-MCNC: 1.8 MG/DL (ref 1.9–2.7)
MCH RBC QN AUTO: 30 PG (ref 26.8–34.3)
MCHC RBC AUTO-ENTMCNC: 32 G/DL (ref 31.4–37.4)
MCV RBC AUTO: 94 FL (ref 82–98)
MONOCYTES # BLD AUTO: 1.36 THOUSAND/ΜL (ref 0.17–1.22)
MONOCYTES NFR BLD AUTO: 10 % (ref 4–12)
NEUTROPHILS # BLD AUTO: 8.77 THOUSANDS/ΜL (ref 1.85–7.62)
NEUTS SEG NFR BLD AUTO: 65 % (ref 43–75)
NITRITE UR QL STRIP: NEGATIVE
NON-SQ EPI CELLS URNS QL MICRO: NORMAL /HPF
NRBC BLD AUTO-RTO: 0 /100 WBCS
P AXIS: 40 DEGREES
PH UR STRIP.AUTO: 6.5 [PH]
PLATELET # BLD AUTO: 479 THOUSANDS/UL (ref 149–390)
PMV BLD AUTO: 10.6 FL (ref 8.9–12.7)
POTASSIUM SERPL-SCNC: 3.6 MMOL/L (ref 3.5–5.3)
PR INTERVAL: 140 MS
PROT SERPL-MCNC: 6.7 G/DL (ref 6.4–8.4)
PROT UR STRIP-MCNC: NEGATIVE MG/DL
PROT UR-MCNC: 8.8 MG/DL
PROT/CREAT UR: 0.2 MG/G{CREAT} (ref 0–0.1)
PROTHROMBIN TIME: 13.8 SECONDS (ref 12.3–15)
QRS AXIS: 61 DEGREES
QRSD INTERVAL: 82 MS
QT INTERVAL: 366 MS
QTC INTERVAL: 430 MS
RBC # BLD AUTO: 3.83 MILLION/UL (ref 3.81–5.12)
RBC #/AREA URNS AUTO: NORMAL /HPF
SODIUM SERPL-SCNC: 134 MMOL/L (ref 135–147)
SP GR UR STRIP.AUTO: <1.005 (ref 1–1.03)
T WAVE AXIS: 24 DEGREES
UROBILINOGEN UR STRIP-ACNC: <2 MG/DL
VENTRICULAR RATE: 83 BPM
WBC # BLD AUTO: 13.52 THOUSAND/UL (ref 4.31–10.16)
WBC #/AREA URNS AUTO: NORMAL /HPF

## 2024-10-21 PROCEDURE — 87147 CULTURE TYPE IMMUNOLOGIC: CPT

## 2024-10-21 PROCEDURE — 84460 ALANINE AMINO (ALT) (SGPT): CPT

## 2024-10-21 PROCEDURE — 87389 HIV-1 AG W/HIV-1&-2 AB AG IA: CPT

## 2024-10-21 PROCEDURE — 99285 EMERGENCY DEPT VISIT HI MDM: CPT

## 2024-10-21 PROCEDURE — 83735 ASSAY OF MAGNESIUM: CPT

## 2024-10-21 PROCEDURE — 93010 ELECTROCARDIOGRAM REPORT: CPT | Performed by: INTERNAL MEDICINE

## 2024-10-21 PROCEDURE — 85730 THROMBOPLASTIN TIME PARTIAL: CPT

## 2024-10-21 PROCEDURE — NC001 PR NO CHARGE: Performed by: OBSTETRICS & GYNECOLOGY

## 2024-10-21 PROCEDURE — 70551 MRI BRAIN STEM W/O DYE: CPT

## 2024-10-21 PROCEDURE — 86803 HEPATITIS C AB TEST: CPT

## 2024-10-21 PROCEDURE — 81001 URINALYSIS AUTO W/SCOPE: CPT

## 2024-10-21 PROCEDURE — 99284 EMERGENCY DEPT VISIT MOD MDM: CPT

## 2024-10-21 PROCEDURE — 85025 COMPLETE CBC W/AUTO DIFF WBC: CPT

## 2024-10-21 PROCEDURE — 86706 HEP B SURFACE ANTIBODY: CPT

## 2024-10-21 PROCEDURE — 80053 COMPREHEN METABOLIC PANEL: CPT

## 2024-10-21 PROCEDURE — 36415 COLL VENOUS BLD VENIPUNCTURE: CPT

## 2024-10-21 PROCEDURE — 85610 PROTHROMBIN TIME: CPT

## 2024-10-21 PROCEDURE — 96360 HYDRATION IV INFUSION INIT: CPT

## 2024-10-21 PROCEDURE — 87340 HEPATITIS B SURFACE AG IA: CPT

## 2024-10-21 PROCEDURE — 93005 ELECTROCARDIOGRAM TRACING: CPT

## 2024-10-21 PROCEDURE — 82570 ASSAY OF URINE CREATININE: CPT

## 2024-10-21 PROCEDURE — 87086 URINE CULTURE/COLONY COUNT: CPT

## 2024-10-21 PROCEDURE — 84156 ASSAY OF PROTEIN URINE: CPT

## 2024-10-21 PROCEDURE — 99205 OFFICE O/P NEW HI 60 MIN: CPT | Performed by: STUDENT IN AN ORGANIZED HEALTH CARE EDUCATION/TRAINING PROGRAM

## 2024-10-21 RX ORDER — ACETAMINOPHEN 325 MG/1
650 TABLET ORAL EVERY 6 HOURS PRN
Status: DISCONTINUED | OUTPATIENT
Start: 2024-10-21 | End: 2024-10-22 | Stop reason: HOSPADM

## 2024-10-21 RX ORDER — CALCIUM CARBONATE 500 MG/1
1000 TABLET, CHEWABLE ORAL DAILY PRN
Status: DISCONTINUED | OUTPATIENT
Start: 2024-10-21 | End: 2024-10-22 | Stop reason: HOSPADM

## 2024-10-21 RX ORDER — LEVOTHYROXINE SODIUM 75 UG/1
75 TABLET ORAL DAILY
Status: DISCONTINUED | OUTPATIENT
Start: 2024-10-22 | End: 2024-10-22 | Stop reason: HOSPADM

## 2024-10-21 RX ORDER — FAMOTIDINE 20 MG/1
20 TABLET, FILM COATED ORAL 2 TIMES DAILY
Status: DISCONTINUED | OUTPATIENT
Start: 2024-10-21 | End: 2024-10-22 | Stop reason: HOSPADM

## 2024-10-21 RX ORDER — FLUOXETINE 10 MG/1
10 CAPSULE ORAL DAILY
Status: DISCONTINUED | OUTPATIENT
Start: 2024-10-22 | End: 2024-10-22 | Stop reason: HOSPADM

## 2024-10-21 RX ADMIN — SODIUM CHLORIDE 500 ML: 0.9 INJECTION, SOLUTION INTRAVENOUS at 15:28

## 2024-10-21 RX ADMIN — DOXYLAMINE SUCCINATE 25 MG: 25 TABLET ORAL at 20:51

## 2024-10-21 NOTE — ASSESSMENT & PLAN NOTE
Chronic per patient.  Currently rated 3/10 bifrontal without radiation photophobia nor nausea  Acetaminophen as needed for pain

## 2024-10-21 NOTE — CONSULTS
Consultation - Neurology   Name: China Scott 34 y.o. female I MRN: [id number]  Unit/Bed#: ED 10 I Date of Admission: 10/21/2024   Date of Service: 10/21/2024 I Hospital Day: 0   Consult to neurology  Consult performed by: Marianne Robbins PA-C  Consult ordered by: TITI Martinez      Physician Requesting Evaluation: Nic Cast DO   Reason for Evaluation / Principal Problem: Hand tingling     Assessment & Plan  Numbness and tingling  34 y.o. right handed female currently 30 weeks pregnant with history of ITP, migraines,and hypothyroidism who presented to the ED on 10/21/24 for evaluation of presyncope and transient RUE/RLE numbness and tingling. The latter which occurred constantly yesterday for 7 hours fluctuating in intensity, and R hand numbness which reoccurred today. Patient also with R iliopsoas weakness on exam.     Work-up:  10/9/24 TSH WNL  10/20/24 CTH negative for acute abnormality.  Labs with Na 134, Mg 1.8, WBC 13.5.   LFTs and coags WNL.   Protein/Cr ration 0.2.   UA with small leuks, negative nitrites, 2-4 WBC and occasional bacteria.    Neuroexam unremarkable aside from right iliopsoas weakness.  Etiology of symptoms unclear at this time though would recommend proceeding with MRI brain and MRV to rule out stroke and cerebral venous thrombosis.  Stroke thought to be less likely in the setting of patient reporting pain in addition to numbness and as patient reports no change in chronic headache however in the setting of hypercoagulable state/pregnancy, reasonable to proceed with thorough evaluation.  Will also obtain B12.  Patient has been started on IV fluids and OB plans to do NST.  Will continue to follow.    Plan:  Obtain MRI brain wo and MRV  B12  Headache  Chronic per patient.  Currently rated 3/10 bifrontal without radiation photophobia nor nausea  Acetaminophen as needed for pain    Recommendations for outpatient neurological follow up have yet to be determined.    History of  "Present Illness   China Scott is a 34 y.o. right handed female currently 30 weeks pregnant with history of ITP, migraines,and hypothyroidism who presented to the ED on 10/21/24 for evaluation of dizziness, lightheadedness and R hand numbness and tingling. Patient also reporting chronic HA which is stable.   Upon further conversation with the patient she reports, on Saturday 10/19 she had an episode of seeing black spots in her vision.  On Dave 10/20 she reports she \"felt like crap.\"  She notes that she began experiencing numbness, tingling and some pain in her right upper and lower extremity.  This was constant however the intensity seem to fluctuate over the course of about 7 hours.  She notes that she called OB who recommended that she be evaluated and she went to the local [facility].  They obtained a CT head which was negative.  She reports the blood work they did there was unrevealing as well.  They were recommending that she be transferred to [facility] in [place] however she signed out AMA as she did not want to go to this facility.  She was supposed to come to [facility] upon arriving home however she reports she felt better at that time and therefore did not present to the ED last night.  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  She washed it right away.  She felt fine at that time.  Later when she was in a room with the patient she reports becoming presyncopal and needing to sit down.  She ultimately decided to come to be evaluated in the ED and during the drive she appreciated recurrence of numbness and tingling in her distal right upper extremity.  In trying to understand her symptoms, she reports that the type of numbness and tingling felt similar to when she has had a panic attack in the past however previously that had been in both of her hands, not down her entire right arm and leg.  She also " admitted to having a compressed nerve in her right neck but again has never had symptoms in her leg before.  The patient feels that she is eating and drinking however her  is concerned she is not eating enough.     Of note, patient reports preeclampsia with prior pregnancy.    Today in the ED VSS. Labs with Na 134, Mg 1.8, WBC 13.5. LFTs and coags WNL. Protein/Cr ration 0.2. UA with small leuks, negative nitrites, 2-4 WBC and occasional bacteria.    Review of Systems   Constitutional:  Positive for fatigue.   HENT:  Negative for trouble swallowing.    Respiratory:  Negative for shortness of breath.    Cardiovascular:  Negative for chest pain.   Neurological:  Positive for light-headedness (presyncope), numbness (transient RUE/RLE numbness, tingling and pain) and headaches (chronic). Negative for speech difficulty and weakness.        I have reviewed the patient's PMH, PSH, Social History, Family History, Meds, and Allergies    Objective :  Temp:  [97.9 °F (36.6 °C)] 97.9 °F (36.6 °C)  HR:  [81-84] 84  BP: (124-126)/(71-78) 124/71  Resp:  [17-20] 20  SpO2:  [98 %-99 %] 99 %  O2 Device: None (Room air)    Physical Exam  Constitutional:       General: She is not in acute distress.     Appearance: She is not ill-appearing or toxic-appearing.   HENT:      Head: Normocephalic and atraumatic.   Eyes:      Extraocular Movements: Extraocular movements intact.   Cardiovascular:      Rate and Rhythm: Normal rate.   Pulmonary:      Effort: Pulmonary effort is normal. No respiratory distress.   Abdominal:      Comments: Pregnant abdomen   Psychiatric:         Speech: Speech normal.       Neurological Exam  Mental Status  Awake, alert and oriented to person, place and time. Speech is normal. Language is fluent with no aphasia. Attention and concentration are normal. Fund of knowledge is appropriate for level of education.    Cranial Nerves  CN II: Visual acuity is normal. Visual fields full to confrontation.  CN III, IV,  VI: Extraocular movements intact bilaterally.  CN V: Facial sensation is normal.  CN VII: Full and symmetric facial movement.  CN VIII: Hearing is normal.  CN XI: Shoulder shrug strength is normal.  CN XII: Tongue midline without atrophy or fasciculations.    Motor  Normal muscle bulk throughout. Normal muscle tone. Strength is 5/5 in all four extremities except as noted. No pronator drift.  R iliopsoas 4+.    Sensory  Light touch is normal in upper and lower extremities. Temperature is normal in upper and lower extremities. Vibration is normal in upper and lower extremities.     Reflexes  Brisk reflexes throughout, symmetric.    Coordination  Right: Finger-to-nose normal. Heel-to-shin normal.        Lab Results: I have personally reviewed pertinent reports.    Recent Labs     10/21/24  1345   WBC 13.52*   HGB 11.5   HCT 35.9   *   SODIUM 134*   K 3.6      CO2 24   BUN 8   CREATININE 0.44*   GLUC 94   MG 1.8*     Imaging: 10/20/24 CTH - report reviewed    VTE Prophylaxis: None currently, patient in ED

## 2024-10-21 NOTE — Clinical Note
Case was discussed with Dr. Marcos and the patient's admission status was agreed to be Admission Status: observation status to the service of Dr. Marcos .

## 2024-10-21 NOTE — ASSESSMENT & PLAN NOTE
34 y.o. right handed female currently 30 weeks pregnant with history of ITP, migraines,and hypothyroidism who presented to the ED on 10/21/24 for evaluation of presyncope and transient RUE/RLE numbness and tingling. The latter which occurred constantly yesterday for 7 hours fluctuating in intensity, and R hand numbness which reoccurred today. Patient also with R iliopsoas weakness on exam.     Work-up:  10/9/24 TSH WNL  10/20/24 CTH negative for acute abnormality.  Labs with Na 134, Mg 1.8, WBC 13.5.   LFTs and coags WNL.   Protein/Cr ration 0.2.   UA with small leuks, negative nitrites, 2-4 WBC and occasional bacteria.    Neuroexam unremarkable aside from right iliopsoas weakness.  Etiology of symptoms unclear at this time though would recommend proceeding with MRI brain and MRV to rule out stroke and cerebral venous thrombosis.  Stroke thought to be less likely in the setting of patient reporting pain in addition to numbness and as patient reports no change in chronic headache however in the setting of hypercoagulable state/pregnancy, reasonable to proceed with thorough evaluation.  Will also obtain B12.  Patient has been started on IV fluids and OB plans to do NST.  Will continue to follow.    Plan:  Obtain MRI brain wo and MRV  B12

## 2024-10-21 NOTE — ED PROVIDER NOTES
"Time reflects when diagnosis was documented in both MDM as applicable and the Disposition within this note       Time User Action Codes Description Comment    10/21/2024  2:38 PM Filomena De La O Add [R20.2] Hand tingling     10/21/2024  4:00 PM Filomena De La O Add [R42] Lightheadedness     10/21/2024  4:01 PM Filomena De La O Add [R51.9] Headache           ED Disposition       ED Disposition   Send to L&D After Provider Eval    Condition   --    Date/Time   Mon Oct 21, 2024  4:07 PM    Comment   Case was discussed with Dr. Marcos and the patient's admission status was agreed to be Admission Status: observation status to the service of Dr. Marcos .               Assessment & Plan       Medical Decision Making  DDx: Electrolyte abnormality, anemia, preeclampsia, dural venous sinus thrombosis, dysrhythmia   Patient presenting a second day of right hand tingling. Patient reports occurred similarly yesterday but returned today. Patient states sought medical treatment at [facility] and then signed out AMA yesterday, but did not come to OB at this facility as instructed. When symptoms returned she came to ED today. Patient has \"tingling\" of her right hand, no lower extremity sensations. Patient states, \"always has a headache\" denies new headache or new headache description.   Patient states, \"felt like I was going to pass out\" but did not actually have syncope.  did remark patient had very little to eat yesterday and today. FHT is 150 BPM, patient denies vaginal discharge, bleeding, pelvic/abdominal pain. Prior history includes G 4 P 1, Miscarriage 1,  1. Patient is approximately 30 weeks. Denies chest pain. Resting in no acute distress. No tachycardia, hypo/hypertension, no hypoxia. IV fluids provided.  Discussed case with OB and Neuro. Per neuro recommend MRI/MRV. Ruled out pre-eclampsia today in ED. Patient to require NST per Dr. Lopez of OB. Patient admitted to obs with Dr. Lopez.   Blood work: Mild " leukocytosis, no anemia, no omar, no gross electrolyte abnormality. No evidence of pre-eclampsia on work up today. No acute events on tele.   Exposure: discussed with patient she will have a baseline set of labs drawn today.  Patient reports that her employer to her knowledge has not completed a source patient workup.  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 aware she will need to contact her employer to follow up for source patient. Aware to follow up with work place, and if not follow up with [facility] now. Reports she cleaned area right away and wearing gloves. 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 Area is clean and well appearing. Patient is up to date on vaccines.  Discussed case with Dr. Riky Ramirez of infectious disease and due to dirty needle stick should offer prophylaxis.  Discussed that patient is up to 72 hours to start treatment however the sooner the better.  Discussed low risk to fetus. Discussed treatment would be [place] and Descovy if patient should chose to utilize HIV prophylaxis. Reviewed with patient all things discussed with Dr. Ramirez. Patient reports she would like to wait at this time, as her work place is attempting to contact the source person. Discussed risk v. Benefits. Relayed this to Dr. Lopez.     Amount and/or Complexity of Data Reviewed  Labs: ordered.             Medications   sodium chloride 0.9 % bolus 500 mL (500 mL Intravenous New Bag 10/21/24 1528)       ED Risk Strat Scores                           SBIRT 20yo+      Flowsheet Row Most Recent Value   Initial Alcohol Screen: US AUDIT-C     1. How often do you have a drink containing alcohol? 0 Filed at: 10/21/2024 1300   2. How many drinks containing alcohol do you have on a typical day you are drinking?  0 Filed at: 10/21/2024 1300   3a. Male UNDER 65: How often do you have five or more drinks on one occasion? 0 Filed at: 10/21/2024 1300   3b. FEMALE Any Age, or MALE 65+:  How often do you have 4 or more drinks on one occassion? 0 Filed at: 10/21/2024 1300   Audit-C Score 0 Filed at: 10/21/2024 1300   ORALIA: How many times in the past year have you...    Used an illegal drug or used a prescription medication for non-medical reasons? Never Filed at: 10/21/2024 1300                            History of Present Illness       Chief Complaint   Patient presents with    Dizziness     Pt to ED c/o dizziness, lightheaded and numbness in R hand. Tingling started in R arm. 30 weeks pregnant. History of pre eclampsia with previous pregnancy       Past Medical History:   Diagnosis Date    Anxiety     Cholelithiasis with acute cholecystitis 01/2024    History of cold sores     History of immune thrombocytopenia 2010    Hydronephrosis of right kidney 07/2022    Migraines     without Aura    TB lung, latent 2011    Thyroid disease     Followed by PCP      Past Surgical History:   Procedure Laterality Date    CHOLECYSTECTOMY LAPAROSCOPIC  01/12/2024    CYSTOSCOPY W/ URETERAL STENT PLACEMENT Right 07/08/2022    Removed 7/28/22    DILATION AND EVACUATION  08/10/2021    MAB    SPLENECTOMY  02/07/2010      Family History   Problem Relation Age of Onset    Thyroid disease Mother         Hypothyroid    Uterine cancer Mother     Diabetes Father         Type 2    Hyperlipidemia Sister       Social History     Tobacco Use    Smoking status: Never     Passive exposure: Never    Smokeless tobacco: Never   Vaping Use    Vaping status: Never Used   Substance Use Topics    Alcohol use: Not Currently    Drug use: Not Currently     Types: Marijuana     Comment: History of marijuana in college      E-Cigarette/Vaping    E-Cigarette Use Never User       E-Cigarette/Vaping Substances    Nicotine No     THC No     CBD No     Flavoring No     Other No     Unknown No       I have reviewed and agree with the history as documented.     Patient is a 34-year-old female currently 30 weeks pregnant arriving for evaluation of  "right hand tingling.  Patient was evaluated yesterday for right hand and right lower extremity tingling.  Patient denies any lateralizing weakness patient states that she \"just does not feel right.\"  Patient was evaluated yesterday at [facility] with a CT scan and signed out AMA after they wanted to transfer.  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  Patient states that she washed her right away.  This is a private clinic where this happened.  Patient reports that she always has headache and her headache is no worse.  Patient states that she felt as though she was going to pass out need to sit down.   is at bedside and reports concern for decreased p.o. intake yesterday prior to her event, and today prior to her event.  Patient does not feel that this is related to her tingling.  Patient states that she felt as though she was going to pass out today but did not.  Patient states that she has no chest pain.          Review of Systems   Constitutional: Negative.    HENT: Negative.     Eyes: Negative.    Respiratory: Negative.     Cardiovascular: Negative.    Gastrointestinal: Negative.    Endocrine: Negative.    Genitourinary: Negative.    Musculoskeletal: Negative.    Allergic/Immunologic: Negative.    Neurological:  Positive for light-headedness, numbness and headaches. Negative for dizziness, tremors, seizures, syncope, facial asymmetry, speech difficulty and weakness.   Hematological: Negative.    Psychiatric/Behavioral: Negative.     All other systems reviewed and are negative.          Objective       ED Triage Vitals [10/21/24 1258]   Temperature Pulse Blood Pressure Respirations SpO2 Patient Position - Orthostatic VS   97.9 °F (36.6 °C) 81 126/78 18 99 % Sitting      Temp Source Heart Rate Source BP Location FiO2 (%) Pain Score    Axillary " Monitor Right arm -- No Pain      Vitals      Date and Time Temp Pulse SpO2 Resp BP Pain Score FACES Pain Rating User   10/21/24 1724 99 °F (37.2 °C) -- -- -- 111/68 -- -- MONA   10/21/24 1722 -- 78 98 % 16 -- No Pain -- MONA   10/21/24 1600 -- 86 97 % -- 118/66 -- -- EW   10/21/24 1545 -- 81 97 % 12 -- -- -- EW   10/21/24 1530 -- 90 99 % -- 115/72 -- -- EW   10/21/24 1515 -- 79 99 % 21 113/68 -- -- EW   10/21/24 1506 -- -- -- -- -- No Pain -- AL   10/21/24 1500 -- 81 100 % 20 125/72 -- -- MB   10/21/24 1445 -- 87 100 % 22 -- -- -- EW   10/21/24 1430 -- 81 99 % 14 103/72 -- -- EW   10/21/24 1400 -- 80 100 % -- 116/71 -- -- EW   10/21/24 1345 -- 84 -- 20 124/71 -- -- EW   10/21/24 1330 -- 83 99 % -- -- -- -- EW   10/21/24 1315 -- 83 98 % 17 -- -- -- EW   10/21/24 1300 -- 84 98 % -- 126/78 -- -- EW   10/21/24 1258 97.9 °F (36.6 °C) 81 99 % 18 126/78 No Pain -- ML            Physical Exam  Vitals and nursing note reviewed.   Constitutional:       Appearance: Normal appearance. She is normal weight.   HENT:      Head: Normocephalic.      Right Ear: External ear normal.      Left Ear: External ear normal.      Nose: Nose normal.      Mouth/Throat:      Mouth: Mucous membranes are moist.      Pharynx: Oropharynx is clear.   Eyes:      General:         Right eye: No discharge.         Left eye: No discharge.      Extraocular Movements: Extraocular movements intact.      Conjunctiva/sclera: Conjunctivae normal.      Pupils: Pupils are equal, round, and reactive to light.   Cardiovascular:      Rate and Rhythm: Normal rate and regular rhythm.      Pulses: Normal pulses.      Heart sounds: Normal heart sounds.   Pulmonary:      Effort: Pulmonary effort is normal. No respiratory distress.      Breath sounds: Normal breath sounds. No stridor. No wheezing, rhonchi or rales.   Chest:      Chest wall: No tenderness.   Abdominal:      General: Abdomen is flat. Bowel sounds are normal.      Palpations: Abdomen is soft.      Comments:   BPM   Musculoskeletal:      Cervical back: Normal range of motion and neck supple.   Skin:     Capillary Refill: Capillary refill takes less than 2 seconds.   Neurological:      General: No focal deficit present.      Mental Status: She is alert and oriented to person, place, and time. Mental status is at baseline.      GCS: GCS eye subscore is 4. GCS verbal subscore is 5. GCS motor subscore is 6.      Cranial Nerves: Cranial nerves 2-12 are intact. No cranial nerve deficit, dysarthria or facial asymmetry.      Sensory: Sensory deficit present.      Motor: Motor function is intact. No weakness.      Coordination: Coordination is intact. Finger-Nose-Finger Test and Heel to Shin Test normal.      Gait: Gait is intact.      Comments: 5/5 left upper extremity strength, no numbness or tingling   5/5 bilateral lower extremity strength, no numbness or tingling     5/5 right upper extremity strength, reports numbness or tingling    Psychiatric:         Mood and Affect: Mood normal.         Behavior: Behavior normal.         Thought Content: Thought content normal.         Judgment: Judgment normal.         Results Reviewed       Procedure Component Value Units Date/Time    Urine Microscopic [[id number]] Collected: 10/21/24 1357    Lab Status: Final result Specimen: Urine, Clean Catch Updated: 10/21/24 1417     RBC, UA None Seen /hpf      WBC, UA 2-4 /hpf      Epithelial Cells Occasional /hpf      Bacteria, UA Occasional /hpf      URINE COMMENT --    Protein / creatinine ratio, urine [[id number]]  (Abnormal) Collected: 10/21/24 1357    Lab Status: Final result Specimen: Urine, Clean Catch Updated: 10/21/24 1414     Creatinine, Ur 55.2 mg/dL      Protein Urine Random 8.8 mg/dL      Prot/Creat Ratio, Ur 0.2    Comprehensive metabolic panel [[id number]]  (Abnormal) Collected: 10/21/24 1345    Lab Status: Final result Specimen: Blood from Arm, Left Updated: 10/21/24 1412     Sodium 134 mmol/L      Potassium 3.6 mmol/L       Chloride 103 mmol/L      CO2 24 mmol/L      ANION GAP 7 mmol/L      BUN 8 mg/dL      Creatinine 0.44 mg/dL      Glucose 94 mg/dL      Calcium 8.4 mg/dL      Corrected Calcium 9.0 mg/dL      AST 19 U/L      ALT 8 U/L      Alkaline Phosphatase 79 U/L      Total Protein 6.7 g/dL      Albumin 3.3 g/dL      Total Bilirubin 0.27 mg/dL      eGFR 132 ml/min/1.73sq m     Narrative:      National Kidney Disease Foundation guidelines for Chronic Kidney Disease (CKD):     Stage 1 with normal or high GFR (GFR > 90 mL/min/1.73 square meters)    Stage 2 Mild CKD (GFR = 60-89 mL/min/1.73 square meters)    Stage 3A Moderate CKD (GFR = 45-59 mL/min/1.73 square meters)    Stage 3B Moderate CKD (GFR = 30-44 mL/min/1.73 square meters)    Stage 4 Severe CKD (GFR = 15-29 mL/min/1.73 square meters)    Stage 5 End Stage CKD (GFR <15 mL/min/1.73 square meters)  Note: GFR calculation is accurate only with a steady state creatinine    Magnesium [[id number]]  (Abnormal) Collected: 10/21/24 1345    Lab Status: Final result Specimen: Blood from Arm, Left Updated: 10/21/24 1412     Magnesium 1.8 mg/dL     ALT [[id number]]  (Normal) Collected: 10/21/24 1345    Lab Status: Final result Specimen: Blood from Arm, Left Updated: 10/21/24 1412     ALT 8 U/L     Protime-INR [[id number]]  (Normal) Collected: 10/21/24 1345    Lab Status: Final result Specimen: Blood from Arm, Left Updated: 10/21/24 1407     Protime 13.8 seconds      INR 1.01    Narrative:      INR Therapeutic Range    Indication                                             INR Range      Atrial Fibrillation                                               2.0-3.0  Hypercoagulable State                                    2.0.2.3  Left Ventricular Asist Device                            2.0-3.0  Mechanical Heart Valve                                  -    Aortic(with afib, MI, embolism, HF, LA enlargement,    and/or coagulopathy)                                     2.0-3.0 (2.5-3.5)     Mitral                                                              2.5-3.5  Prosthetic/Bioprosthetic Heart Valve               2.0-3.0  Venous thromboembolism (VTE: VT, PE        2.0-3.0    APTT [[id number]]  (Normal) Collected: 10/21/24 1345    Lab Status: Final result Specimen: Blood from Arm, Left Updated: 10/21/24 1407     PTT 25 seconds     UA w Reflex to Microscopic w Reflex to Culture [[id number]]  (Abnormal) Collected: 10/21/24 1357    Lab Status: Final result Specimen: Urine, Clean Catch Updated: 10/21/24 1406     Color, UA Yellow     Clarity, UA Clear     Specific Gravity, UA <1.005     pH, UA 6.5     Leukocytes, UA Small     Nitrite, UA Negative     Protein, UA Negative mg/dl      Glucose, UA Negative mg/dl      Ketones, UA Negative mg/dl      Urobilinogen, UA <2.0 mg/dl      Bilirubin, UA Negative     Occult Blood, UA Negative     URINE COMMENT --    Urine culture [[id number]] Collected: 10/21/24 1357    Lab Status: In process Specimen: Urine, Clean Catch Updated: 10/21/24 1406    CBC and differential [[id number]]  (Abnormal) Collected: 10/21/24 1345    Lab Status: Final result Specimen: Blood from Arm, Left Updated: 10/21/24 1353     WBC 13.52 Thousand/uL      RBC 3.83 Million/uL      Hemoglobin 11.5 g/dL      Hematocrit 35.9 %      MCV 94 fL      MCH 30.0 pg      MCHC 32.0 g/dL      RDW 13.9 %      MPV 10.6 fL      Platelets 479 Thousands/uL      nRBC 0 /100 WBCs      Segmented % 65 %      Immature Grans % 1 %      Lymphocytes % 22 %      Monocytes % 10 %      Eosinophils Relative 1 %      Basophils Relative 1 %      Absolute Neutrophils 8.77 Thousands/µL      Absolute Immature Grans 0.09 Thousand/uL      Absolute Lymphocytes 3.02 Thousands/µL      Absolute Monocytes 1.36 Thousand/µL      Eosinophils Absolute 0.18 Thousand/µL      Basophils Absolute 0.10 Thousands/µL     HIV 1/2 AG/AB w Reflex [facility] for 2 yr old and above [[id number]] Collected: 10/21/24 1345    Lab Status: In process Specimen: Blood from Arm, Left  "Updated: 10/21/24 1351    Hepatitis B surface antigen [[id number]] Collected: 10/21/24 1345    Lab Status: In process Specimen: Blood from Arm, Left Updated: 10/21/24 1351    Hepatitis C antibody [[id number]] Collected: 10/21/24 1345    Lab Status: In process Specimen: Blood from Arm, Left Updated: 10/21/24 1350    Hepatitis B surface antibody [[id number]] Collected: 10/21/24 1345    Lab Status: In process Specimen: Blood from Arm, Left Updated: 10/21/24 1350            MRI Inpatient Order    (Results Pending)   MRI inpatient order    (Results Pending)       ECG 12 Lead Documentation Only    Date/Time: 10/21/2024 1:17 PM    Performed by: TITI Martinez  Authorized by: TITI Martinez    Indications / Diagnosis:  \"felt like going to pass out\"  ECG reviewed by me, the ED Provider: yes    Patient location:  ED  Interpretation:     Interpretation: normal    Rate:     ECG rate:  83    ECG rate assessment: normal    Rhythm:     Rhythm: sinus rhythm    Ectopy:     Ectopy: none    QRS:     QRS axis:  Normal  Conduction:     Conduction: normal    ST segments:     ST segments:  Normal  T waves:     T waves: normal        ED Medication and Procedure Management   Prior to Admission Medications   Prescriptions Last Dose Informant Patient Reported? Taking?   FLUoxetine (PROzac) 10 mg capsule 10/20/2024  No Yes   Sig: Take 1 capsule (10 mg total) by mouth daily   Prenatal Vit-Fe Sulfate-FA-DHA (Prenatal Vitamin/Min +DHA) 27-0.8-200 MG CAPS 10/21/2024  Yes Yes   Sig: Take by mouth   aspirin 81 mg chewable tablet 10/21/2024  Yes Yes   Sig: Chew 162 mg daily   famotidine (PEPCID) 20 mg tablet 10/20/2024  Yes Yes   Sig: Take 20 mg by mouth 2 (two) times a day   levothyroxine 75 mcg tablet 10/21/2024  Yes Yes   Sig: Take 75 mcg by mouth daily      Facility-Administered Medications: None     Current Discharge Medication List        CONTINUE these medications which have NOT CHANGED    Details   aspirin 81 mg chewable tablet " Chew 162 mg daily      famotidine (PEPCID) 20 mg tablet Take 20 mg by mouth 2 (two) times a day      FLUoxetine (PROzac) 10 mg capsule Take 1 capsule (10 mg total) by mouth daily  Qty: 30 capsule, Refills: 6    Associated Diagnoses: Depression affecting pregnancy in second trimester, antepartum      levothyroxine 75 mcg tablet Take 75 mcg by mouth daily      Prenatal Vit-Fe Sulfate-FA-DHA (Prenatal Vitamin/Min +DHA) 27-0.8-200 MG CAPS Take by mouth           No discharge procedures on file.  ED SEPSIS DOCUMENTATION   Time reflects when diagnosis was documented in both MDM as applicable and the Disposition within this note       Time User Action Codes Description Comment    10/21/2024  2:38 PM Filomena De La O [R20.2] Hand tingling     10/21/2024  4:00 PM Filomena De La O [R42] Lightheadedness     10/21/2024  4:01 PM Filomena De La O [R51.9] Headache                  TITI Martinez  10/21/24 1808

## 2024-10-21 NOTE — H&P
History & Physical - OB/GYN   China Scott 34 y.o. female MRN: [id number]  Unit/Bed#:  211- Encounter: [id number]        Chief complaint:  Numbness/tingling right upper and lower extremity.     HPI:  34 y.o. yo  at 30w0d weeks presents with complaint of numbness/tingling right upper and lower extremity. Symptoms began 2 days ago. She also had spots in vision. These symptoms have resolved spontaneously. She reports mild headache daily that is no different today. It is all over head. She reports h/o headaches/migraines.    She was seen at [place] over the weekend for these symtoms. She had head CT with no acute changes and then left AMA.     She had accidental needle stick at work today.     Contractions:  no  Fetal movement:  yes  Vaginal bleeding:   no  Leaking of fluid:  no      Pregnancy Complications:  Patient Active Problem List   Diagnosis    Hypothyroidism complicating pregnancy, third trimester    28 weeks gestation of pregnancy    Depression affecting pregnancy in third trimester, antepartum    History of severe pre-eclampsia    Transfer @ 18 weeks    Iron deficiency anemia secondary to inadequate dietary iron intake    Numbness and tingling    Headache       PMH:  Past Medical History:   Diagnosis Date    Anxiety     Cholelithiasis with acute cholecystitis 2024    History of cold sores     History of immune thrombocytopenia 2010    Hydronephrosis of right kidney 2022    Migraines     without Aura    TB lung, latent 2011    Thyroid disease     Followed by PCP       PSH:  Past Surgical History:   Procedure Laterality Date    CHOLECYSTECTOMY LAPAROSCOPIC  2024    CYSTOSCOPY W/ URETERAL STENT PLACEMENT Right 2022    Removed 22    DILATION AND EVACUATION  08/10/2021    MAB    SPLENECTOMY  2010       Social Hx:  Social History     Tobacco Use    Smoking status: Never     Passive exposure: Never    Smokeless tobacco: Never   Vaping Use    Vaping status: Never Used    Substance Use Topics    Alcohol use: Not Currently    Drug use: Not Currently     Types: Marijuana     Comment: History of marijuana in college         OB Hx:  OB History    Para Term  AB Living   3 1 1 0 1 1   SAB IAB Ectopic Multiple Live Births   1 0 0 0 1      # Outcome Date GA Lbr Rocky/2nd Weight Sex Type Anes PTL Lv   3 Current            2 Term 10/29/22 38w1d / 00:59 2940 g (6 lb 7.7 oz) F Vag-Spont EPI N TONI      Birth Comments: GHTN-PP pre-eclampsia      Name: HENRY WEEKS      Apgar1: 8  Apgar5: 9   1 SAB 21 7w0d             Birth Comments: D&E       Meds:  No current facility-administered medications on file prior to encounter.     Current Outpatient Medications on File Prior to Encounter   Medication Sig Dispense Refill    aspirin 81 mg chewable tablet Chew 162 mg daily      famotidine (PEPCID) 20 mg tablet Take 20 mg by mouth 2 (two) times a day      FLUoxetine (PROzac) 10 mg capsule Take 1 capsule (10 mg total) by mouth daily 30 capsule 6    levothyroxine 75 mcg tablet Take 75 mcg by mouth daily      Prenatal Vit-Fe Sulfate-FA-DHA (Prenatal Vitamin/Min +DHA) 27-0.8-200 MG CAPS Take by mouth         Allergies:  Allergies   Allergen Reactions    Pollen Extract Other (See Comments)     Itchy eyes, runny nose       Physical Exam:  /68   Pulse 78   Temp 99 °F (37.2 °C) (Temporal)   Resp 16   Ht 5' (1.524 m)   Wt 73.5 kg (162 lb)   LMP 2024 (Exact Date)   SpO2 98%   BMI 31.64 kg/m²         Physical Exam  Constitutional:       Appearance: Normal appearance.   HENT:      Head: Normocephalic and atraumatic.   Pulmonary:      Effort: Pulmonary effort is normal.   Abdominal:      Palpations: Abdomen is soft. There is no mass.      Tenderness: There is no abdominal tenderness. There is no guarding or rebound.   Musculoskeletal:         General: Normal range of motion.   Neurological:      Mental Status: She is alert and oriented to person, place, and time.    Skin:     General: Skin is warm and dry.   Psychiatric:         Mood and Affect: Mood normal.         Behavior: Behavior normal.         Thought Content: Thought content normal.         Judgment: Judgment normal.          FHT:  140 / Moderate 6 - 25 bpm / +accles, no decels  [place]: none    Labs:   Admission on 10/21/2024   Component Date Value    WBC 10/21/2024 13.52 (H)     RBC 10/21/2024 3.83     Hemoglobin 10/21/2024 11.5     Hematocrit 10/21/2024 35.9     MCV 10/21/2024 94     MCH 10/21/2024 30.0     MCHC 10/21/2024 32.0     RDW 10/21/2024 13.9     MPV 10/21/2024 10.6     Platelets 10/21/2024 479 (H)     nRBC 10/21/2024 0     Segmented % 10/21/2024 65     Immature Grans % 10/21/2024 1     Lymphocytes % 10/21/2024 22     Monocytes % 10/21/2024 10     Eosinophils Relative 10/21/2024 1     Basophils Relative 10/21/2024 1     Absolute Neutrophils 10/21/2024 8.77 (H)     Absolute Immature Grans 10/21/2024 0.09     Absolute Lymphocytes 10/21/2024 3.02     Absolute Monocytes 10/21/2024 1.36 (H)     Eosinophils Absolute 10/21/2024 0.18     Basophils Absolute 10/21/2024 0.10     Sodium 10/21/2024 134 (L)     Potassium 10/21/2024 3.6     Chloride 10/21/2024 103     CO2 10/21/2024 24     ANION GAP 10/21/2024 7     BUN 10/21/2024 8     Creatinine 10/21/2024 0.44 (L)     Glucose 10/21/2024 94     Calcium 10/21/2024 8.4     Corrected Calcium 10/21/2024 9.0     AST 10/21/2024 19     ALT 10/21/2024 8     Alkaline Phosphatase 10/21/2024 79     Total Protein 10/21/2024 6.7     Albumin 10/21/2024 3.3 (L)     Total Bilirubin 10/21/2024 0.27     eGFR 10/21/2024 132     Creatinine, Ur 10/21/2024 55.2     Protein Urine Random 10/21/2024 8.8     Prot/Creat Ratio, Ur 10/21/2024 0.2 (H)     Color, UA 10/21/2024 Yellow     Clarity, UA 10/21/2024 Clear     Specific Gravity, UA 10/21/2024 <1.005 (L)     pH, UA 10/21/2024 6.5     Leukocytes, UA 10/21/2024 Small (A)     Nitrite, UA 10/21/2024 Negative     Protein, UA 10/21/2024 Negative      Glucose, UA 10/21/2024 Negative     Ketones, UA 10/21/2024 Negative     Urobilinogen, UA 10/21/2024 <2.0     Bilirubin, UA 10/21/2024 Negative     Occult Blood, UA 10/21/2024 Negative     URINE COMMENT 10/21/2024      Magnesium 10/21/2024 1.8 (L)     Protime 10/21/2024 13.8     INR 10/21/2024 1.01     PTT 10/21/2024 25     ALT 10/21/2024 8     Ventricular Rate 10/21/2024 83     Atrial Rate 10/21/2024 83     VT Interval 10/21/2024 140     QRSD Interval 10/21/2024 82     QT Interval 10/21/2024 366     QTC Interval 10/21/2024 430     P Axis 10/21/2024 40     QRS Axis 10/21/2024 61     T Wave [place] 10/21/2024 24     RBC, UA 10/21/2024 None Seen     WBC, UA 10/21/2024 2-4     Epithelial Cells 10/21/2024 Occasional     Bacteria, UA 10/21/2024 Occasional     URINE COMMENT 10/21/2024         Assessment:   34 y.o.  at 30w0d weeks with h/o migraines who presented with complaint of right upper and lower extremity tingling, headache.     Plan:   Admit for observation  Appreciate neuro recs  No signs of preeclampsia  MRI/MRV pending  [place] - ED provider reviewed with ID. Pt counseled regarding prophylaxis. Declining at this time.

## 2024-10-22 VITALS
WEIGHT: 162 LBS | DIASTOLIC BLOOD PRESSURE: 60 MMHG | SYSTOLIC BLOOD PRESSURE: 102 MMHG | BODY MASS INDEX: 31.8 KG/M2 | HEIGHT: 60 IN | RESPIRATION RATE: 18 BRPM | TEMPERATURE: 97.5 F | OXYGEN SATURATION: 98 % | HEART RATE: 81 BPM

## 2024-10-22 PROBLEM — R20.0 NUMBNESS AND TINGLING: Status: RESOLVED | Noted: 2024-10-21 | Resolved: 2024-10-22

## 2024-10-22 PROBLEM — R20.2 NUMBNESS AND TINGLING: Status: RESOLVED | Noted: 2024-10-21 | Resolved: 2024-10-22

## 2024-10-22 PROBLEM — Z3A.30 30 WEEKS GESTATION OF PREGNANCY: Status: ACTIVE | Noted: 2024-08-21

## 2024-10-22 LAB
BACTERIA UR CULT: ABNORMAL
BACTERIA UR CULT: ABNORMAL
VIT B12 SERPL-MCNC: 262 PG/ML (ref 180–914)

## 2024-10-22 PROCEDURE — NC001 PR NO CHARGE: Performed by: OBSTETRICS & GYNECOLOGY

## 2024-10-22 PROCEDURE — 99232 SBSQ HOSP IP/OBS MODERATE 35: CPT | Performed by: STUDENT IN AN ORGANIZED HEALTH CARE EDUCATION/TRAINING PROGRAM

## 2024-10-22 PROCEDURE — 82607 VITAMIN B-12: CPT | Performed by: PHYSICIAN ASSISTANT

## 2024-10-22 RX ORDER — LANOLIN ALCOHOL/MO/W.PET/CERES
1000 CREAM (GRAM) TOPICAL DAILY
Qty: 30 TABLET | Refills: 1 | Status: SHIPPED | OUTPATIENT
Start: 2024-10-22

## 2024-10-22 RX ADMIN — FLUOXETINE HYDROCHLORIDE 10 MG: 10 CAPSULE ORAL at 08:19

## 2024-10-22 RX ADMIN — LEVOTHYROXINE SODIUM 75 MCG: 75 TABLET ORAL at 08:19

## 2024-10-22 RX ADMIN — FAMOTIDINE 20 MG: 20 TABLET, FILM COATED ORAL at 08:20

## 2024-10-22 RX ADMIN — PRENATAL VIT W/ FE FUMARATE-FA TAB 27-0.8 MG 1 TABLET: 27-0.8 TAB at 08:20

## 2024-10-22 NOTE — ASSESSMENT & PLAN NOTE
34 y.o.  at 30w1d, HD#2 as admission with neurologic symptoms.  Fetal status reassuring.  Reactive NST again this morning.  Normal ALL, BPP 10/10.  --> Continue routine prenatal care.  Next appt 10/25/24.  --> Fetal kick counts.

## 2024-10-22 NOTE — ASSESSMENT & PLAN NOTE
Taking fluoxetine.  Started pregnancy off this medication, then had symptoms, and restarted.  She feels well but has concerns about  depression at birth.  --> Continue fluoxetine 10mg qDay.  --> Consider neonatology consult to discuss.

## 2024-10-22 NOTE — ASSESSMENT & PLAN NOTE
"Normal blood pressure.  No evidence of preeclampsia.  Evaluation per neurology.  Has had imaging including normal CT at [facility] 10/20, and normal MRI and MRV here 10/21.  Currently feels entirely well, with no neurologic symptoms at all.  --> Okay for discharge per neurology.  Does not need outpatient neurology followup.  --> Follow up B-12 level.  Per neurology, \"Goal B12>400. If low once resulted, can consider weekly IM injections of B12 1000mcg (which patient stating she would prefer) or daily B12 1000mcg tablets\"  "

## 2024-10-22 NOTE — PROGRESS NOTES
Progress Note - Neurology   Name: China Scott 34 y.o. female I MRN: [id number]  Unit/Bed#: -01 I Date of Admission: 10/21/2024   Date of Service: 10/22/2024 I Hospital Day: 0     Assessment & Plan  Numbness and tingling  34 y.o. right handed female currently 30 weeks pregnant with history of ITP, migraines,and hypothyroidism who presented to the ED on 10/21/24 for evaluation of presyncope and transient RUE/RLE numbness and tingling. The latter which occurred constantly yesterday for 7 hours fluctuating in intensity, and R hand numbness which reoccurred today. Patient also with R iliopsoas weakness on exam.     Work-up:  10/9/24 TSH WNL  10/20/24 CTH negative for acute abnormality.  Labs with Na 134, Mg 1.8, WBC 13.5.   LFTs and coags WNL.   Protein/Cr ration 0.2.   UA with small leuks, negative nitrites, 2-4 WBC and occasional bacteria.  MRV: No evidence of dural venous sinus thrombosis.   MRI brain wo: No evidence of acute infarct, intracranial hemorrhage or mass.     Neuroexam unremarkable.  MRI brain, and MRV negative for stroke and CVT. Differential diagnosis includes complicated migraine (history of migraines though this HA not severe) vs panic attack (patient reported sensation felt similar to those she gets during panic attack but different location/lateralizing), vs poor oral intake/vitamin deficiency vs cervical radiculopathy, though the latter doesn't explain all symptoms. B12 pending. Will defer continued evaluation of s/s of pre-eclampsia to primary team, though according to their notes, this is not suspected at this time. OB management as per OB. No further inpatient neurologic work-up.  No further inpatient neurologic recommendations    Plan:  Trend WBC  B12 pending  Goal B12>400. If low once resulted, can consider weekly IM injections of B12 1000mcg (which patient stating she would prefer) or daily B12 1000mcg tablets    China Scott will not need outpatient follow up with Neurology. She  will not require outpatient neurological testing.      Subjective   Patient reports she is doing well with no recurrence of her symptoms. She feels a bit better. Acknowledges that she could be eating/drinking more and that she is not taking prenatals regularly but is agreeable to try to be more consistent.    Review of Systems   Constitutional:  Positive for fatigue.   Respiratory:  Negative for shortness of breath.    Cardiovascular:  Negative for chest pain.   Neurological:  Positive for headaches (minimal). Negative for speech difficulty, weakness and numbness.         Objective :  Temp:  [97.5 °F (36.4 °C)-99 °F (37.2 °C)] 97.5 °F (36.4 °C)  HR:  [67-90] 81  BP: (102-126)/(60-78) 102/60  Resp:  [12-22] 18  SpO2:  [96 %-100 %] 98 %  O2 Device: None (Room air)    Physical Exam  Constitutional:       General: She is not in acute distress.     Appearance: She is not ill-appearing or toxic-appearing.   HENT:      Head: Normocephalic and atraumatic.   Eyes:      Extraocular Movements: Extraocular movements intact.   Pulmonary:      Effort: Pulmonary effort is normal. No respiratory distress.   Neurological:      Motor: Motor strength is normal.  Psychiatric:         Speech: Speech normal.       Neurological Exam  Mental Status  Awake, alert and oriented to person, place and time. Speech is normal. Language is fluent with no aphasia. Attention and concentration are normal. Fund of knowledge is appropriate for level of education.    Cranial Nerves  CN II: Visual acuity is normal. Visual fields full to confrontation.  CN III, IV, VI: Extraocular movements intact bilaterally.  CN V: Facial sensation is normal.  CN VII: Full and symmetric facial movement.  CN XII: Tongue midline without atrophy or fasciculations.    Motor  Normal muscle bulk throughout. Normal muscle tone. Strength is 5/5 throughout all four extremities.  No pronator drift.    Sensory  Light touch is normal in upper and lower extremities.      Reflexes  Plantar reflex downgoing bilaterally.    Coordination  Right: Finger-to-nose normal.        Lab Results: I have reviewed the following results:    VTE Pharmacologic Prophylaxis: Sequential compression device (Venodyne)

## 2024-10-22 NOTE — ASSESSMENT & PLAN NOTE
34 y.o. right handed female currently 30 weeks pregnant with history of ITP, migraines,and hypothyroidism who presented to the ED on 10/21/24 for evaluation of presyncope and transient RUE/RLE numbness and tingling. The latter which occurred constantly yesterday for 7 hours fluctuating in intensity, and R hand numbness which reoccurred today. Patient also with R iliopsoas weakness on exam.     Work-up:  10/9/24 TSH WNL  10/20/24 CTH negative for acute abnormality.  Labs with Na 134, Mg 1.8, WBC 13.5.   LFTs and coags WNL.   Protein/Cr ration 0.2.   UA with small leuks, negative nitrites, 2-4 WBC and occasional bacteria.  MRV: No evidence of dural venous sinus thrombosis.   MRI brain wo: No evidence of acute infarct, intracranial hemorrhage or mass.     Neuroexam unremarkable.  MRI brain, and MRV negative for stroke and CVT. Differential diagnosis includes complicated migraine (history of migraines though this HA not severe) vs panic attack (patient reported sensation felt similar to those she gets during panic attack but different location/lateralizing), vs poor oral intake/vitamin deficiency vs cervical radiculopathy, though the latter doesn't explain all symptoms. B12 pending. Will defer continued evaluation of s/s of pre-eclampsia to primary team, though according to their notes, this is not suspected at this time. OB management as per OB. No further inpatient neurologic work-up.  No further inpatient neurologic recommendations    Plan:  Trend WBC  B12 pending  Goal B12>400. If low once resulted, can consider weekly IM injections of B12 1000mcg (which patient stating she would prefer) or daily B12 1000mcg tablets

## 2024-10-22 NOTE — QUICK NOTE
Update:    B-12 resulted 262 - normal range, but neurology goal was > 400.    Called [phone].  There was no answer.      I left a message explaining this, and advising China that I would send prescription for B-12 to her pharmacy.  Advised her could discuss getting weekly B-12 shots at her next prenatal appointment 10/25/24.    Rx B-12 1000mcg po qDay sent to her pharmacy.    Samuel Washington MD

## 2024-10-22 NOTE — PROGRESS NOTES
"`Progress Note - OB/GYN   Name: China Scott 34 y.o. female I MRN: [id number]  Unit/Bed#: -01 I Date of Admission: 10/21/2024   Date of Service: 10/22/2024 I Hospital Day: 0     Assessment & Plan  30 weeks gestation of pregnancy  34 y.o.  at 30w1d, HD#2 as admission with neurologic symptoms.  Fetal status reassuring.  Reactive NST again this morning.  Normal ALL, BPP 10/10.  --> Continue routine prenatal care.  Next appt 10/25/24.  --> Fetal kick counts.  Numbness and tingling  Normal blood pressure.  No evidence of preeclampsia.  Evaluation per neurology.  Has had imaging including normal CT at [facility] 10/20, and normal MRI and MRV here 10/21.  Currently feels entirely well, with no neurologic symptoms at all.  --> Okay for discharge per neurology.  Does not need outpatient neurology followup.  --> Follow up B-12 level.  Per neurology, \"Goal B12>400. If low once resulted, can consider weekly IM injections of B12 1000mcg (which patient stating she would prefer) or daily B12 1000mcg tablets\"  Hypothyroidism complicating pregnancy, third trimester  --> Continue levothyroxine 75mcg qDay.  Depression affecting pregnancy in third trimester, antepartum  Taking fluoxetine.  Started pregnancy off this medication, then had symptoms, and restarted.  She feels well but has concerns about  depression at birth.  --> Continue fluoxetine 10mg qDay.  --> Consider neonatology consult to discuss.    Samuel Washington MD  10/22/24  -------------------------------------------------------------------------------------    Subjective/    Feels well.  No contractions.  No VB, no LoF.  (+)FM.    No numbness and tingling.  No dizziness/lightheadedness.    States she had numbness and tingling 10/20, that then resolved.  Then 10/21 had dizziness and lightheadedness while doing injection; she thinks this may have been vasovagal.  No symptoms now at all.    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    Objective/  Blood pressure 102/60, pulse 81, temperature 97.5 °F (36.4 °C), temperature source Temporal, resp. rate 18, height 5' (1.524 m), weight 73.5 kg (162 lb), last menstrual period 2024, SpO2 98%.  Patient Vitals for the past 24 hrs:   BP Temp Temp src Pulse Resp SpO2 Height Weight   10/22/24 0725 102/60 97.5 °F (36.4 °C) Temporal 81 18 98 % -- --   10/21/24 2055 119/71 97.7 °F (36.5 °C) Temporal 80 18 96 % -- --   10/21/24 1900 114/73 98 °F (36.7 °C) Oral 67 18 -- -- --   10/21/24 1724 111/68 99 °F (37.2 °C) Temporal -- -- -- -- --   10/21/24 1722 -- -- -- 78 16 98 % 5' (1.524 m) 73.5 kg (162 lb)   10/21/24 1600 118/66 -- -- 86 -- 97 % -- --   10/21/24 1545 -- -- -- 81 12 97 % -- --   10/21/24 1530 115/72 -- -- 90 -- 99 % -- --   10/21/24 1515 113/68 -- -- 79 21 99 % -- --   10/21/24 1500 125/72 -- -- 81 20 100 % -- --   10/21/24 1445 -- -- -- 87 22 100 % -- --   10/21/24 1430 103/72 -- -- 81 14 99 % -- --   10/21/24 1400 116/71 -- -- 80 -- 100 % -- --   10/21/24 1345 124/71 -- -- 84 20 -- -- --   10/21/24 1330 -- -- -- 83 -- 99 % -- --   10/21/24 1315 -- -- -- 83 17 98 % -- --       Physical Exam/      General:  Alert, comfortable, NAD      Cardiovascular: Regular rate and rhythm      Respiratory: Clear to auscultation bilaterally.      Abdomen: Soft, non-tender, non-distended      Fundus: Size c/w dates, nontender.  No tetany.  No contractions palpated.      Extremities: Warm, non-tender      Labs/      Lab Results   Component Value Date    WBC 13.52 (H) 10/21/2024    HGB 11.5 10/21/2024    HCT 35.9 10/21/2024    MCV 94 10/21/2024     (H) 10/21/2024       FHR:  135 moderate variability (+)accels, no decels    [place]: No apparent contractions      Ultrasound:      Single live active  IUP, cephalic    ALL:      Q1: 2.87cm      Q2: 3.88cm      Q3: 2.96cm      Q4: 1.96cm      Total: 11.85cm    Ultrasound BPP 8/8 (10/10 total)    Labs/  Lab Results   Component Value Date    HGB 11.5 10/21/2024     (H) 10/21/2024    BUN 8 10/21/2024    CREATININE 0.44 (L) 10/21/2024    AST 19 10/21/2024    ALT 8 10/21/2024    ALT 8 10/21/2024    UTPCR 0.2 (H) 10/21/2024     Imaging/      10/21/24 ([facility]) MRI BRAIN WITHOUT CONTRAST   IMPRESSION:   No evidence of acute infarct, intracranial hemorrhage or mass.    10/21/24 ([facility]) MAGNETIC RESONANCE VENOGRAPHY (MRV) OF THE BRAIN   IMPRESSION:No evidence of dural venous sinus thrombosis.    10/20/24 ([facility]) CT HEAD WITHOUT CONTRAST   IMPRESSION:   No acute findings.

## 2024-10-25 ENCOUNTER — HOSPITAL ENCOUNTER (OUTPATIENT)
Facility: HOSPITAL | Age: 34
Setting detail: OBSERVATION
Discharge: HOME | End: 2024-10-26
Attending: STUDENT IN AN ORGANIZED HEALTH CARE EDUCATION/TRAINING PROGRAM | Admitting: STUDENT IN AN ORGANIZED HEALTH CARE EDUCATION/TRAINING PROGRAM
Payer: COMMERCIAL

## 2024-10-25 ENCOUNTER — HOSPITAL ENCOUNTER (OUTPATIENT)
Facility: HOSPITAL | Age: 34
End: 2024-10-25
Attending: OBSTETRICS & GYNECOLOGY | Admitting: OBSTETRICS & GYNECOLOGY
Payer: COMMERCIAL

## 2024-10-25 ENCOUNTER — ROUTINE PRENATAL (OUTPATIENT)
Dept: OBGYN CLINIC | Facility: CLINIC | Age: 34
End: 2024-10-25
Payer: COMMERCIAL

## 2024-10-25 VITALS
DIASTOLIC BLOOD PRESSURE: 71 MMHG | HEART RATE: 85 BPM | SYSTOLIC BLOOD PRESSURE: 117 MMHG | WEIGHT: 163.2 LBS | RESPIRATION RATE: 19 BRPM | TEMPERATURE: 98.7 F | HEIGHT: 60 IN | BODY MASS INDEX: 32.04 KG/M2

## 2024-10-25 VITALS
WEIGHT: 163.2 LBS | DIASTOLIC BLOOD PRESSURE: 62 MMHG | HEIGHT: 60 IN | BODY MASS INDEX: 32.04 KG/M2 | SYSTOLIC BLOOD PRESSURE: 104 MMHG

## 2024-10-25 DIAGNOSIS — Z87.59 HISTORY OF SEVERE PRE-ECLAMPSIA: ICD-10-CM

## 2024-10-25 DIAGNOSIS — E03.9 HYPOTHYROIDISM COMPLICATING PREGNANCY, THIRD TRIMESTER: Primary | ICD-10-CM

## 2024-10-25 DIAGNOSIS — O99.283 HYPOTHYROIDISM COMPLICATING PREGNANCY, THIRD TRIMESTER: Primary | ICD-10-CM

## 2024-10-25 DIAGNOSIS — R20.0 NUMBNESS AND TINGLING: ICD-10-CM

## 2024-10-25 DIAGNOSIS — O99.343 DEPRESSION AFFECTING PREGNANCY IN THIRD TRIMESTER, ANTEPARTUM: ICD-10-CM

## 2024-10-25 DIAGNOSIS — D50.8 IRON DEFICIENCY ANEMIA SECONDARY TO INADEQUATE DIETARY IRON INTAKE: ICD-10-CM

## 2024-10-25 DIAGNOSIS — R20.2 NUMBNESS AND TINGLING: ICD-10-CM

## 2024-10-25 DIAGNOSIS — Z3A.18 18 WEEKS GESTATION OF PREGNANCY: ICD-10-CM

## 2024-10-25 DIAGNOSIS — Z3A.30 30 WEEKS GESTATION OF PREGNANCY: ICD-10-CM

## 2024-10-25 DIAGNOSIS — F32.A DEPRESSION AFFECTING PREGNANCY IN THIRD TRIMESTER, ANTEPARTUM: ICD-10-CM

## 2024-10-25 PROBLEM — O47.00 PRETERM CONTRACTIONS: Chronic | Status: ACTIVE | Noted: 2024-10-25

## 2024-10-25 PROBLEM — O47.00 PRETERM CONTRACTIONS: Status: ACTIVE | Noted: 2024-10-25

## 2024-10-25 PROBLEM — O60.00 PRETERM LABOR: Status: ACTIVE | Noted: 2024-10-25

## 2024-10-25 LAB
FIBRONECTIN FETAL VAG QL: POSITIVE
SL AMB  POCT GLUCOSE, UA: NORMAL
SL AMB POCT URINE PROTEIN: NORMAL

## 2024-10-25 PROCEDURE — 82731 ASSAY OF FETAL FIBRONECTIN: CPT | Performed by: OBSTETRICS & GYNECOLOGY

## 2024-10-25 PROCEDURE — 59025 FETAL NON-STRESS TEST: CPT | Performed by: OBSTETRICS & GYNECOLOGY

## 2024-10-25 PROCEDURE — NC001 PR NO CHARGE: Performed by: OBSTETRICS & GYNECOLOGY

## 2024-10-25 PROCEDURE — 86780 TREPONEMA PALLIDUM: CPT

## 2024-10-25 PROCEDURE — 86850 RBC ANTIBODY SCREEN: CPT

## 2024-10-25 PROCEDURE — 85027 COMPLETE CBC AUTOMATED: CPT

## 2024-10-25 PROCEDURE — PNV: Performed by: PHYSICIAN ASSISTANT

## 2024-10-25 PROCEDURE — 81002 URINALYSIS NONAUTO W/O SCOPE: CPT | Performed by: PHYSICIAN ASSISTANT

## 2024-10-25 PROCEDURE — G0379 DIRECT REFER HOSPITAL OBSERV: HCPCS

## 2024-10-25 PROCEDURE — 86900 BLOOD TYPING SEROLOGIC ABO: CPT

## 2024-10-25 PROCEDURE — 99215 OFFICE O/P EST HI 40 MIN: CPT

## 2024-10-25 PROCEDURE — 86901 BLOOD TYPING SEROLOGIC RH(D): CPT

## 2024-10-25 RX ORDER — FLUOXETINE 10 MG/1
10 CAPSULE ORAL DAILY
Status: CANCELLED | OUTPATIENT
Start: 2024-10-26

## 2024-10-25 RX ORDER — FAMOTIDINE 20 MG/1
20 TABLET, FILM COATED ORAL 2 TIMES DAILY
Status: CANCELLED | OUTPATIENT
Start: 2024-10-25

## 2024-10-25 RX ORDER — HYDROXYZINE HYDROCHLORIDE 25 MG/1
25 TABLET, FILM COATED ORAL EVERY 6 HOURS PRN
Status: DISCONTINUED | OUTPATIENT
Start: 2024-10-25 | End: 2024-10-26 | Stop reason: HOSPADM

## 2024-10-25 RX ORDER — UREA 10 %
LOTION (ML) TOPICAL
COMMUNITY

## 2024-10-25 RX ORDER — CALCIUM CARBONATE 500 MG/1
1000 TABLET, CHEWABLE ORAL 3 TIMES DAILY PRN
Status: DISCONTINUED | OUTPATIENT
Start: 2024-10-25 | End: 2024-10-26 | Stop reason: HOSPADM

## 2024-10-25 RX ORDER — ASPIRIN 81 MG/1
162 TABLET, CHEWABLE ORAL DAILY
Status: CANCELLED | OUTPATIENT
Start: 2024-10-26

## 2024-10-25 RX ORDER — ACETAMINOPHEN 325 MG/1
975 TABLET ORAL EVERY 8 HOURS PRN
Status: DISCONTINUED | OUTPATIENT
Start: 2024-10-25 | End: 2024-10-26 | Stop reason: HOSPADM

## 2024-10-25 RX ORDER — BETAMETHASONE SODIUM PHOSPHATE AND BETAMETHASONE ACETATE 3; 3 MG/ML; MG/ML
12 INJECTION, SUSPENSION INTRA-ARTICULAR; INTRALESIONAL; INTRAMUSCULAR; SOFT TISSUE EVERY 24 HOURS
Status: CANCELLED | OUTPATIENT
Start: 2024-10-25 | End: 2024-10-27

## 2024-10-25 RX ORDER — NIFEDIPINE 10 MG/1
20 CAPSULE ORAL ONCE
Status: COMPLETED | OUTPATIENT
Start: 2024-10-25 | End: 2024-10-25

## 2024-10-25 RX ORDER — FERROUS SULFATE 325(65) MG
325 TABLET ORAL
Status: CANCELLED | OUTPATIENT
Start: 2024-10-26

## 2024-10-25 RX ORDER — LEVOTHYROXINE SODIUM 75 UG/1
75 TABLET ORAL DAILY
Status: CANCELLED | OUTPATIENT
Start: 2024-10-26

## 2024-10-25 RX ORDER — BETAMETHASONE SODIUM PHOSPHATE AND BETAMETHASONE ACETATE 3; 3 MG/ML; MG/ML
12 INJECTION, SUSPENSION INTRA-ARTICULAR; INTRALESIONAL; INTRAMUSCULAR; SOFT TISSUE EVERY 24 HOURS
Status: DISCONTINUED | OUTPATIENT
Start: 2024-10-25 | End: 2024-10-25 | Stop reason: HOSPADM

## 2024-10-25 RX ORDER — ONDANSETRON 2 MG/ML
4 INJECTION INTRAMUSCULAR; INTRAVENOUS EVERY 6 HOURS PRN
Status: DISCONTINUED | OUTPATIENT
Start: 2024-10-25 | End: 2024-10-26 | Stop reason: HOSPADM

## 2024-10-25 RX ADMIN — NIFEDIPINE 20 MG: 10 CAPSULE ORAL at 20:00

## 2024-10-25 RX ADMIN — ACETAMINOPHEN 975 MG: 325 TABLET ORAL at 23:39

## 2024-10-25 RX ADMIN — SODIUM CHLORIDE, SODIUM LACTATE, POTASSIUM CHLORIDE, AND CALCIUM CHLORIDE 1000 ML: .6; .31; .03; .02 INJECTION, SOLUTION INTRAVENOUS at 17:08

## 2024-10-25 RX ADMIN — BETAMETHASONE SODIUM PHOSPHATE AND BETAMETHASONE ACETATE 12 MG: 3; 3 INJECTION, SUSPENSION INTRA-ARTICULAR; INTRALESIONAL; INTRAMUSCULAR at 19:43

## 2024-10-25 NOTE — EMTALA/ACUTE CARE TRANSFER
[facility] LABOR AND DELIVERY  [address]  [place] [address]  Dept: [phone]      EMTALA TRANSFER CONSENT    NAME China FUENTES 1990                              MRN [id number]    I have been informed of my rights regarding examination, treatment, and transfer   by Dr. Tiffanie Maxwell MD    Benefits:  NICU    Risks:  auto accident or delivery en route      Transfer Request   I acknowledge that my medical condition has been evaluated and explained to me by the emergency department physician or other qualified medical person and/or my attending physician who has recommended and offered to me further medical examination and treatment. I understand the Hospital's obligation with respect to the treatment and stabilization of my emergency medical condition. I nevertheless request to be transferred. I release the Hospital, the doctor, and any other persons caring for me from all responsibility or liability for any injury or ill effects that may result from my transfer and agree to accept all responsibility for the consequences of my choice to transfer, rather than receive stabilizing treatment at the Hospital. I understand that because the transfer is my request, my insurance may not provide reimbursement for the services.  The Hospital will assist and direct me and my family in how to make arrangements for transfer, but the hospital is not liable for any fees charged by the transport service.  In spite of this understanding, I refuse to consent to further medical examination and treatment which has been offered to me, and request transfer to  . I authorize the performance of emergency medical procedures and treatments upon me in both transit and upon arrival at the receiving facility.  Additionally, I authorize the release of any and all medical records to the receiving facility and request they be transported with me, if  possible.    I authorize the performance of emergency medical procedures and treatments upon me in both transit and upon arrival at the receiving facility.  Additionally, I authorize the release of any and all medical records to the receiving facility and request they be transported with me, if possible.  I understand that the safest mode of transportation during a medical emergency is an ambulance and that the Hospital advocates the use of this mode of transport. Risks of traveling to the receiving facility by car, including absence of medical control, life sustaining equipment, such as oxygen, and medical personnel has been explained to me and I fully understand them.    (JANKI CORRECT BOX BELOW)  [  ]  I consent to the stated transfer and to be transported by ambulance/helicopter.  [  ]  I consent to the stated transfer, but refuse transportation by ambulance and accept full responsibility for my transportation by car.  I understand the risks of non-ambulance transfers and I exonerate the Hospital and its staff from any deterioration in my condition that results from this refusal.    X___________________________________________    DATE  10/25/24  TIME________  Signature of patient or legally responsible individual signing on patient behalf           RELATIONSHIP TO PATIENT_________________________          Provider Certification    NAME China Scott                                        [facility] 1990                              MRN [id number]    A medical screening exam was performed on the above named patient.  Based on the examination:    Condition Necessitating Transfer  labor    Patient Condition:  stable    Reason for Transfer:   labor    Transfer Requirements: Facility     Space available and qualified personnel available for treatment as acknowledged by    Agreed to accept transfer and to provide appropriate medical treatment as acknowledged by          Appropriate medical records of the  examination and treatment of the patient are provided at the time of transfer   STAFF INITIAL WHEN COMPLETED _______  Transfer will be performed by qualified personnel from    and appropriate transfer equipment as required, including the use of necessary and appropriate life support measures.    Provider Certification: I have examined the patient and explained the following risks and benefits of being transferred/refusing transfer to the patient/family:         Based on these reasonable risks and benefits to the patient and/or the unborn child(nusrat), and based upon the information available at the time of the patient’s examination, I certify that the medical benefits reasonably to be expected from the provision of appropriate medical treatments at another medical facility outweigh the increasing risks, if any, to the individual’s medical condition, and in the case of labor to the unborn child, from effecting the transfer.    X____________________________________________ DATE 10/25/24        TIME_______      ORIGINAL - SEND TO MEDICAL RECORDS   COPY - SEND WITH PATIENT DURING TRANSFER

## 2024-10-25 NOTE — ASSESSMENT & PLAN NOTE
Patient will plan to continue B12 injections supplied from her job. Aware of recommended dose of 1,000 mcg/week.

## 2024-10-25 NOTE — PROCEDURES
China Donahuen, a  at 30w4d with an MARIALUISA of 2024, Alternate MARIALUISA Entry, was seen at [facility] for the following procedure(s): $Procedure Type: NST]    Nonstress Test  Reason for NST: Decreased Fetal Movement  Variability: Moderate  Decelerations: None  Accelerations: Yes  Uterine Irritability: No  Contractions: Not present                   Interpretation  Nonstress Test Interpretation: Reactive  Overall Impression: Reassuring

## 2024-10-25 NOTE — PROGRESS NOTES
Routine Prenatal Visit  [facility] - [address], [address], [place] [address]    Assessment/Plan:  China is a 34 y.o. year old  at 30w4d who presents for routine prenatal visit.     1. Hypothyroidism complicating pregnancy, third trimester  2. 30 weeks gestation of pregnancy  Assessment & Plan:  Reviewed decreased fetal movement with patient. Sent to L&D for further monitoring.   Reviewed Tdap, would like to get next visit.   Orders:  -     POCT urine dip  3. Depression affecting pregnancy in third trimester, antepartum  4. History of severe pre-eclampsia  5. Transfer @ 18 weeks  6. Iron deficiency anemia secondary to inadequate dietary iron intake  7. Numbness and tingling  Assessment & Plan:  Patient will plan to continue B12 injections supplied from her job. Aware of recommended dose of 1,000 mcg/week.       Next OB Visit 2 weeks.    Subjective:     CC: Prenatal care    China Scott is a 34 y.o.  female who presents for routine prenatal care at 30w4d.  Pregnancy ROS: Reports decreased fetal movement in the last 2 days. Has felt some movement but markedly decreased. Has been busier getting ready for her daughter's birthday party tomorrow. No N/V, HA, cramping, VB, LOF, edema, domestic violence, or smoking. Tolerating PNV.    Patient was seen on L&D on 10/22/2024 for tingling in hands, B12 was suboptimal at 262, Neurology recommended over 400. 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     The following portions of the patient's history were reviewed and updated as appropriate: allergies, current medications, past family history, past medical history, obstetric history, gynecologic history, past social history, past surgical history and problem list.      Objective:  /62 (BP Location: Left arm, Patient Position: Sitting, Cuff Size: Standard)   Ht 5' (1.524 m)   Wt 74 kg (163 lb 3.2 oz)   LMP 2024 (Exact Date)   BMI 31.87  kg/m²   Pregravid Weight/BMI: 55.3 kg (122 lb) (BMI 23.83)  Current Weight: 74 kg (163 lb 3.2 oz)   Total Weight Gain: 18.7 kg (41 lb 3.2 oz)   Pre- Vitals      Flowsheet Row Most Recent Value   Prenatal Assessment    Fetal Heart Rate 138  [initially 176, decreased to 130 and then again to 108-116 improved to 138.]   Fundal Height (cm) 30 cm   Movement Decreased   Prenatal Vitals    Blood Pressure 104/62   Weight - Scale 74 kg (163 lb 3.2 oz)   Urine Albumin/Glucose    Dilation/Effacement/Station    Vaginal Drainage    Edema    LLE Edema None   RLE Edema None   Facial Edema None             General: Well appearing, no distress  Abdomen: Soft, gravid, nontender  Fundal Height: Appropriate for gestational age.  Extremities: Warm and well perfused.  Non tender.

## 2024-10-25 NOTE — H&P
OB H&P  China Donahuen  1990  /-[birth year]          Assessment/Plan:   at 30 weeks.    contractions with positive fFN  Discussed with Dr. Osorio, cervix has progressed from 1 cm uneffaced on arrival to 1.5cm 50% now, he recommends transfer to [employer], beta methasone,  Contractions currently 5-10 min after IVF, q 2-3 on arrival  Hx preE last pregnancy   BP normal here  Iron deficiency anemia   On oral iron  B12 deficiency with some neuro symptoms   On IM in office replacement  Hx migraines  Depression   Stable       34 y.o. yo  at 30 weeks by us and lmp    Chief complaint:  Decreased fetal movement    HPI:  Pt presents with decreased fetal movement  since today.  Seen in office for visit and sent here for monitoring.  On arrival noted to have contracitons q 2-3 minutes which she admits to feeling only once she saw them on monitor, thought they were fetal movement.        Pregnancy Complications:  Patient Active Problem List   Diagnosis    Hypothyroidism complicating pregnancy, third trimester    30 weeks gestation of pregnancy    Depression affecting pregnancy in third trimester, antepartum    History of severe pre-eclampsia    Transfer @ 18 weeks    Iron deficiency anemia secondary to inadequate dietary iron intake    Numbness and tingling         Past Medical History:  Past Medical History:   Diagnosis Date    Anxiety     Cholelithiasis with acute cholecystitis 2024    History of cold sores     History of immune thrombocytopenia 2010    Hydronephrosis of right kidney 2022    Migraines     without Aura    TB lung, latent     Thyroid disease     Followed by PCP       Past Surgical History:  Past Surgical History:   Procedure Laterality Date    CHOLECYSTECTOMY LAPAROSCOPIC  2024    CYSTOSCOPY W/ URETERAL STENT PLACEMENT Right 2022    Removed 22    DILATION AND EVACUATION  08/10/2021    MAB    SPLENECTOMY  2010       Social Hx:  Social History      Socioeconomic History    Marital status: /Civil Union     Spouse name: Not on file    Number of children: Not on file    Years of education: Not on file    Highest education level: Not on file   Occupational History    Not on file   Tobacco Use    Smoking status: Never     Passive exposure: Never    Smokeless tobacco: Never   Vaping Use    Vaping status: Never Used   Substance and Sexual Activity    Alcohol use: Not Currently    Drug use: Not Currently     Types: Marijuana     Comment: History of marijuana in college    Sexual activity: Yes     Partners: Male     Birth control/protection: None   Other Topics Concern    Not on file   Social History Narrative    Not on file     Social Determinants of Health     Financial Resource Strain: Not At Risk (6/29/2023)    Received from [facility]    Financial Resource Strain     In the last 12 months did you skip medications to save money?: No     In the last 12 months, was there a time when you needed to see a doctor but could not because of cost?: No   Food Insecurity: No Food Insecurity (10/21/2024)    Nursing - Inadequate Food Risk Classification     Worried About Running Out of Food in the Last Year: Never true     Ran Out of Food in the Last Year: Never true     Ran Out of Food in the Last Year: 1   Transportation Needs: No Transportation Needs (10/21/2024)    Nursing - Transportation Risk Classification     Lack of Transportation: Not on file     Lack of Transportation: 2   Physical Activity: Not on file   Stress: Not on file   Social Connections: Not At Risk (6/29/2023)    Received from [facility]    Social Connections     Do you often feel lonely?: No   Intimate Partner Violence: Unknown (10/21/2024)    Nursing IPS     Feels Physically and Emotionally Safe: Not on file     Physically Hurt by Someone: Not on file     Humiliated or Emotionally Abused by Someone: Not on file     Physically Hurt by Someone: 2  "    Hurt or Threatened by Someone: 2   Housing Stability: Unknown (10/21/2024)    Nursing: Inadequate Housing Risk Classification     Has Housing: Not on file     Worried About Losing Housing: Not on file     Unable to Get Utilities: Not on file     Unable to Pay for Housing in the Last Year: 2     Has Housin       Meds:  No current facility-administered medications on file prior to encounter.     Current Outpatient Medications on File Prior to Encounter   Medication Sig Dispense Refill    aspirin 81 mg chewable tablet Chew 162 mg daily      famotidine (PEPCID) 20 mg tablet Take 20 mg by mouth 2 (two) times a day      Ferrous Sulfate ER (Slow Fe) 45 MG TBCR Take by mouth      FLUoxetine (PROzac) 10 mg capsule Take 1 capsule (10 mg total) by mouth daily 30 capsule 6    levothyroxine 75 mcg tablet Take 75 mcg by mouth daily      Prenatal Vit-Fe Sulfate-FA-DHA (Prenatal Vitamin/Min +DHA) 27-0.8-200 MG CAPS Take by mouth      vitamin B-12 (VITAMIN B-12) 1,000 mcg tablet Take 1 tablet (1,000 mcg total) by mouth daily 30 tablet 1       Allergies:  Allergies   Allergen Reactions    Pollen Extract Other (See Comments)     Itchy eyes, runny nose           RoS/    Constitutional: Negative    CV: Negative    Pulm: Negative    GI: Negative    Urinary: Negative    Neuro: Negative    Musculoskeletal: Negative      Labs:    Recent Results (from the past 24 hour(s))   POCT urine dip    Collection Time: 10/25/24  4:03 PM   Result Value Ref Range    POCT URINE PROTEIN neg     GLUCOSE, UA neg    Fetal fibronectin    Collection Time: 10/25/24  5:13 PM   Result Value Ref Range    Fetal Fibronectin Positive          Obstetric History:    G 3 P 1011  1 FT  with gest HTN and post partum preE    Labs:  No results found for: \"STREPGRPB\"  Type & Screen:  ABO Grouping   Date Value Ref Range Status   2024 B  Final    No results found for: \"RH\" No results found for: \"ANTIBODYSCR\"  No results found for: \"SPECIMEXP\"  HIV-1/HIV-2 AB " "  Date Value Ref Range Status   06/05/2024 Non-Reactive  Final     Hepatitis B Surface Ag   Date Value Ref Range Status   10/21/2024 Non-reactive Non-Reactive Final     RPR   Date Value Ref Range Status   10/09/2024 Non Reactive Non Reactive Final     No results found for: \"RUBELLAIGGQT\"  Glucose   Date Value Ref Range Status   10/09/2024 93 70 - 139 mg/dL Final     Comment:     According to ADA, a glucose threshold of >139 mg/dL after 50-gram  load identifies approximately 80% of women with gestational  diabetes mellitus, while the sensitivity is further increased to  approximately 90% by a threshold of >129 mg/dL.             Physical Exam:  Vital signs:    Vitals:    10/25/24 1640   BP: 119/70   Pulse: 77   Resp: 18   Temp: 98 °F (36.7 °C)          Constitutional:       General: She is not in acute distress.     Appearance: Normal appearance.   HENT:      Head: Normocephalic.      Nose: Nose normal.   Eyes:      General: No scleral icterus.  Cardiovascular:      Pulses: Normal pulses.   Pulmonary:      Effort: Pulmonary effort is normal. No respiratory distress.      Breath sounds: No wheezing.   Abdominal:      General: Bowel sounds are normal. There is no distension.      Palpations: Abdomen is soft.Uterine size appropriate for gestational age     Tenderness: There is no abdominal tenderness. There is no guarding.   Musculoskeletal:      Cervical back: Neck supple.      Right lower leg: No edema.      Left lower leg: No edema.   Skin:     General: Skin is warm.      Capillary Refill: Capillary refill takes less than 2 seconds.   Neurological:      Mental Status: She is alert and oriented to person, place, and time.   Psychiatric:         Mood and Affect: Mood normal.      Cervix: 1.5 cm  50%  (was a tight 1 cm and uneffaced on arrival)  FHR: cat 1  CTXN: q 5-10 min currently (q 2-3 on arrival)      Bedside US  Vertex, normal fluid, fetus active    "

## 2024-10-25 NOTE — ASSESSMENT & PLAN NOTE
Reviewed decreased fetal movement with patient. Sent to L&D for further monitoring.   Reviewed Tdap, would like to get next visit.

## 2024-10-26 ENCOUNTER — HOSPITAL ENCOUNTER (OUTPATIENT)
Facility: HOSPITAL | Age: 34
Setting detail: OBSERVATION
End: 2024-10-26
Attending: STUDENT IN AN ORGANIZED HEALTH CARE EDUCATION/TRAINING PROGRAM | Admitting: STUDENT IN AN ORGANIZED HEALTH CARE EDUCATION/TRAINING PROGRAM
Payer: COMMERCIAL

## 2024-10-26 VITALS
SYSTOLIC BLOOD PRESSURE: 113 MMHG | OXYGEN SATURATION: 97 % | HEART RATE: 94 BPM | WEIGHT: 163 LBS | TEMPERATURE: 98.4 F | HEIGHT: 60 IN | RESPIRATION RATE: 20 BRPM | BODY MASS INDEX: 32 KG/M2 | DIASTOLIC BLOOD PRESSURE: 69 MMHG

## 2024-10-26 LAB
ABO GROUP BLD: NORMAL
BLD GP AB SCN SERPL QL: NEGATIVE
ERYTHROCYTE [DISTWIDTH] IN BLOOD BY AUTOMATED COUNT: 14.1 % (ref 11.6–15.1)
HCT VFR BLD AUTO: 32 % (ref 34.8–46.1)
HGB BLD-MCNC: 10.6 G/DL (ref 11.5–15.4)
HOLD SPECIMEN: NORMAL
MCH RBC QN AUTO: 30 PG (ref 26.8–34.3)
MCHC RBC AUTO-ENTMCNC: 33.1 G/DL (ref 31.4–37.4)
MCV RBC AUTO: 91 FL (ref 82–98)
PLATELET # BLD AUTO: 433 THOUSANDS/UL (ref 149–390)
PMV BLD AUTO: 10 FL (ref 8.9–12.7)
RBC # BLD AUTO: 3.53 MILLION/UL (ref 3.81–5.12)
RH BLD: POSITIVE
SPECIMEN EXPIRATION DATE: NORMAL
TREPONEMA PALLIDUM IGG+IGM AB [PRESENCE] IN SERUM OR PLASMA BY IMMUNOASSAY: NORMAL
WBC # BLD AUTO: 15.99 THOUSAND/UL (ref 4.31–10.16)

## 2024-10-26 PROCEDURE — NC001 PR NO CHARGE: Performed by: STUDENT IN AN ORGANIZED HEALTH CARE EDUCATION/TRAINING PROGRAM

## 2024-10-26 PROCEDURE — 99214 OFFICE O/P EST MOD 30 MIN: CPT

## 2024-10-26 RX ORDER — FERROUS SULFATE 325(65) MG
325 TABLET ORAL
Status: CANCELLED | OUTPATIENT
Start: 2024-10-26

## 2024-10-26 RX ORDER — LEVOTHYROXINE SODIUM 75 UG/1
75 TABLET ORAL
Status: DISCONTINUED | OUTPATIENT
Start: 2024-10-26 | End: 2024-10-26 | Stop reason: HOSPADM

## 2024-10-26 RX ORDER — FLUOXETINE 10 MG/1
10 CAPSULE ORAL DAILY
Status: DISCONTINUED | OUTPATIENT
Start: 2024-10-26 | End: 2024-10-26 | Stop reason: HOSPADM

## 2024-10-26 RX ORDER — NIFEDIPINE 10 MG/1
10 CAPSULE ORAL EVERY 6 HOURS SCHEDULED
Status: DISCONTINUED | OUTPATIENT
Start: 2024-10-26 | End: 2024-10-26 | Stop reason: HOSPADM

## 2024-10-26 RX ORDER — LEVOTHYROXINE SODIUM 75 UG/1
75 TABLET ORAL DAILY
Status: CANCELLED | OUTPATIENT
Start: 2024-10-26

## 2024-10-26 RX ORDER — FLUOXETINE 10 MG/1
10 CAPSULE ORAL DAILY
Status: CANCELLED | OUTPATIENT
Start: 2024-10-26

## 2024-10-26 RX ORDER — BETAMETHASONE SODIUM PHOSPHATE AND BETAMETHASONE ACETATE 3; 3 MG/ML; MG/ML
12 INJECTION, SUSPENSION INTRA-ARTICULAR; INTRALESIONAL; INTRAMUSCULAR; SOFT TISSUE EVERY 24 HOURS
Status: DISCONTINUED | OUTPATIENT
Start: 2024-10-26 | End: 2024-10-26

## 2024-10-26 RX ORDER — SODIUM CHLORIDE, SODIUM LACTATE, POTASSIUM CHLORIDE, CALCIUM CHLORIDE 600; 310; 30; 20 MG/100ML; MG/100ML; MG/100ML; MG/100ML
125 INJECTION, SOLUTION INTRAVENOUS CONTINUOUS
Status: DISCONTINUED | OUTPATIENT
Start: 2024-10-26 | End: 2024-10-26 | Stop reason: HOSPADM

## 2024-10-26 RX ORDER — LIDOCAINE 50 MG/G
1 PATCH TOPICAL DAILY
Status: DISCONTINUED | OUTPATIENT
Start: 2024-10-26 | End: 2024-10-26 | Stop reason: HOSPADM

## 2024-10-26 RX ORDER — DOCUSATE SODIUM 100 MG/1
100 CAPSULE, LIQUID FILLED ORAL 2 TIMES DAILY
Status: DISCONTINUED | OUTPATIENT
Start: 2024-10-26 | End: 2024-10-26 | Stop reason: HOSPADM

## 2024-10-26 RX ORDER — BETAMETHASONE SODIUM PHOSPHATE AND BETAMETHASONE ACETATE 3; 3 MG/ML; MG/ML
12 INJECTION, SUSPENSION INTRA-ARTICULAR; INTRALESIONAL; INTRAMUSCULAR; SOFT TISSUE ONCE
Status: DISCONTINUED | OUTPATIENT
Start: 2024-10-26 | End: 2024-10-26

## 2024-10-26 RX ORDER — FERROUS SULFATE 325(65) MG
325 TABLET ORAL
Status: DISCONTINUED | OUTPATIENT
Start: 2024-10-26 | End: 2024-10-26 | Stop reason: HOSPADM

## 2024-10-26 RX ORDER — BETAMETHASONE SODIUM PHOSPHATE AND BETAMETHASONE ACETATE 3; 3 MG/ML; MG/ML
12 INJECTION, SUSPENSION INTRA-ARTICULAR; INTRALESIONAL; INTRAMUSCULAR; SOFT TISSUE ONCE
Status: COMPLETED | OUTPATIENT
Start: 2024-10-26 | End: 2024-10-26

## 2024-10-26 RX ORDER — FAMOTIDINE 20 MG/1
20 TABLET, FILM COATED ORAL 2 TIMES DAILY
Status: CANCELLED | OUTPATIENT
Start: 2024-10-26

## 2024-10-26 RX ORDER — FAMOTIDINE 20 MG/1
20 TABLET, FILM COATED ORAL 2 TIMES DAILY
Status: DISCONTINUED | OUTPATIENT
Start: 2024-10-26 | End: 2024-10-26 | Stop reason: HOSPADM

## 2024-10-26 RX ORDER — CYCLOBENZAPRINE HCL 10 MG
10 TABLET ORAL 3 TIMES DAILY PRN
Status: DISCONTINUED | OUTPATIENT
Start: 2024-10-26 | End: 2024-10-26 | Stop reason: HOSPADM

## 2024-10-26 RX ADMIN — NIFEDIPINE 10 MG: 10 CAPSULE ORAL at 01:29

## 2024-10-26 RX ADMIN — FAMOTIDINE 20 MG: 20 TABLET, FILM COATED ORAL at 08:27

## 2024-10-26 RX ADMIN — FLUOXETINE 10 MG: 10 CAPSULE ORAL at 10:16

## 2024-10-26 RX ADMIN — BETAMETHASONE SODIUM PHOSPHATE AND BETAMETHASONE ACETATE 12 MG: 3; 3 INJECTION, SUSPENSION INTRA-ARTICULAR; INTRALESIONAL; INTRAMUSCULAR at 11:29

## 2024-10-26 RX ADMIN — FERROUS SULFATE TAB 325 MG (65 MG ELEMENTAL FE) 325 MG: 325 (65 FE) TAB at 08:27

## 2024-10-26 RX ADMIN — HYDROXYZINE HYDROCHLORIDE 25 MG: 25 TABLET ORAL at 01:23

## 2024-10-26 RX ADMIN — ACETAMINOPHEN 975 MG: 325 TABLET ORAL at 07:39

## 2024-10-26 RX ADMIN — SODIUM CHLORIDE, SODIUM LACTATE, POTASSIUM CHLORIDE, AND CALCIUM CHLORIDE 125 ML/HR: .6; .31; .03; .02 INJECTION, SOLUTION INTRAVENOUS at 01:05

## 2024-10-26 RX ADMIN — SODIUM CHLORIDE, SODIUM LACTATE, POTASSIUM CHLORIDE, AND CALCIUM CHLORIDE 125 ML/HR: .6; .31; .03; .02 INJECTION, SOLUTION INTRAVENOUS at 08:46

## 2024-10-26 RX ADMIN — Medication 1 TABLET: at 10:16

## 2024-10-26 RX ADMIN — DOCUSATE SODIUM 100 MG: 100 CAPSULE, LIQUID FILLED ORAL at 10:19

## 2024-10-26 RX ADMIN — NIFEDIPINE 10 MG: 10 CAPSULE ORAL at 07:39

## 2024-10-26 NOTE — H&P
"H & P- Obstetrics   China Scott 34 y.o. female MRN: [id number]  Unit/Bed#: LD TRIAGE 2- Encounter: [id number]    Assessment: 34 y.o.  at 30w5d admitted for  contractions with positive FFN and concern for possible  labor.    Plan:   *  contractions  Assessment & Plan  Patient presented to [facility] for  contractions  SVE at [facility] made change from 1 cm to 1 cm & 50% effacement with positive FFN  Transferred to [facility] for possible  labor  SVE on admission at [facility] unchanged at 1.5/50/-3  NST reactive  Remy with contractions every 5-8 min  Cephalic on US at [facility] immediately prior to transfer    Admit for observation for  contractions and positive FFN concerning for possible  labor  Betamethasone for fetal lung maturation 10/25 - 10/26 started at , continued at [facility]  Procardia for tocolysis started at , continued at [facility]  Will defer Magnesium for fetal neuroprotection and Penicillin for GBS prophylaxis given lower concern for  labor given lack of cervical change after transfer, though can start these if indicated at any point  Monitoring: continuous  FEN: reg diet, IVF  DVT ppx: SCDs    Iron deficiency anemia secondary to inadequate dietary iron intake  Assessment & Plan  Lab Results   Component Value Date/Time    HGB 10.6 (L) 10/25/2024 11:46 PM     Home iron ordered    History of severe pre-eclampsia  Assessment & Plan   G1: developed gHTN at 38w then became PreE w/ SF treated w/ 12 hr mag & Procardia until 6w PP  Normotensive this pregnancy    Depression affecting pregnancy in third trimester, antepartum  Assessment & Plan  Home Prozac ordered    30 weeks gestation of pregnancy  Assessment & Plan  See A/P under \" contractions\"    Hypothyroidism complicating pregnancy, third trimester  Assessment & Plan  Home Levothyroxine ordered      Discussed case and plan w/ Dr. Doyle    Chief Complaint: contractions    HPI: China " Sonia is a 34 y.o.  with an MARIALUISA of 2024, Alternate MARIALUISA Entry at 30w5d who is being admitted for  contractions with positive FFN and concern for possible  labor. She complains of uterine contractions, occurring every 5-10 minutes, has no LOF, and reports no VB. She states she has felt good FM.    Patient Active Problem List   Diagnosis    Hypothyroidism complicating pregnancy, third trimester    30 weeks gestation of pregnancy    Depression affecting pregnancy in third trimester, antepartum    History of severe pre-eclampsia    Transfer @ 18 weeks    Iron deficiency anemia secondary to inadequate dietary iron intake    Numbness and tingling     contractions     Baby complications/comments: none    Review of Systems   Constitutional:  Negative for chills and fever.   Eyes:  Negative for visual disturbance.   Respiratory:  Negative for cough and shortness of breath.    Cardiovascular:  Negative for chest pain and leg swelling.   Gastrointestinal:  Negative for abdominal pain, diarrhea, nausea and vomiting.   Genitourinary:  Positive for pelvic pain. Negative for dysuria, frequency, hematuria, urgency, vaginal bleeding and vaginal discharge.   Neurological:  Negative for dizziness, light-headedness and headaches.       OB Hx:  OB History    Para Term  AB Living   3 1 1   1 1   SAB IAB Ectopic Multiple Live Births   1       1      # Outcome Date GA Lbr Rocky/2nd Weight Sex Type Anes PTL Lv   3 Current            2 Term 10/29/22 38w1d / 00:59 2940 g (6 lb 7.7 oz) F Vag-Spont EPI N TONI      Birth Comments: GHTN-PP pre-eclampsia   1 SAB 21 7w0d             Birth Comments: D&E       Past Medical Hx:  Past Medical History:   Diagnosis Date    Anxiety     Cholelithiasis with acute cholecystitis 2024    History of cold sores     History of immune thrombocytopenia 2010    Hydronephrosis of right kidney 2022    Migraines     without Aura    TB lung, latent     Thyroid  disease     Followed by PCP       Past Surgical hx:  Past Surgical History:   Procedure Laterality Date    CHOLECYSTECTOMY LAPAROSCOPIC  01/12/2024    CYSTOSCOPY W/ URETERAL STENT PLACEMENT Right 07/08/2022    Removed 7/28/22    DILATION AND EVACUATION  08/10/2021    MAB    SPLENECTOMY  02/07/2010       Social Hx:  Alcohol use: no  Tobacco use: no  Other substance use: no  Other: N/A    Allergies   Allergen Reactions    Pollen Extract Other (See Comments)     Itchy eyes, runny nose         Medications Prior to Admission:     aspirin 81 mg chewable tablet    famotidine (PEPCID) 20 mg tablet    Ferrous Sulfate ER (Slow Fe) 45 MG TBCR    FLUoxetine (PROzac) 10 mg capsule    levothyroxine 75 mcg tablet    Prenatal Vit-Fe Sulfate-FA-DHA (Prenatal Vitamin/Min +DHA) 27-0.8-200 MG CAPS    vitamin B-12 (VITAMIN B-12) 1,000 mcg tablet    Objective:    Vitals:  Temp:  [98 °F (36.7 °C)-98.7 °F (37.1 °C)] 98.5 °F (36.9 °C)  HR:  [77-87] 87  BP: (101-119)/(59-71) 101/59  Resp:  [18-19] 18  SpO2:  [96 %] 96 %  Body mass index is 31.83 kg/m².     Physical Exam:  Physical Exam  Constitutional:       General: She is not in acute distress.     Appearance: Normal appearance. She is not ill-appearing.   HENT:      Mouth/Throat:      Mouth: Mucous membranes are moist.   Eyes:      Extraocular Movements: Extraocular movements intact.   Cardiovascular:      Rate and Rhythm: Normal rate and regular rhythm.      Heart sounds: Normal heart sounds.   Pulmonary:      Effort: Pulmonary effort is normal.      Breath sounds: Normal breath sounds.   Abdominal:      General: There is no distension.      Palpations: Abdomen is soft.      Tenderness: There is no abdominal tenderness. There is no guarding.   Musculoskeletal:         General: No swelling or tenderness.      Right lower leg: No edema.      Left lower leg: No edema.   Neurological:      General: No focal deficit present.      Mental Status: She is alert.   Skin:     General: Skin is warm  and dry.      Coloration: Skin is not jaundiced or pale.   Psychiatric:         Mood and Affect: Mood normal.         Behavior: Behavior normal.         Thought Content: Thought content normal.         Judgment: Judgment normal.        SVE: 1.5/50/-3 (unchanged from last check at [facility])    TAUS:  Cephalic on US at [facility] immediately prior to transfer    FHT:  Baseline Rate (FHR): 130 bpm  Variability: Moderate  Accelerations: 15 x 15 or greater  Decelerations: None  NST reactive    TOCO:   Irregular contractions every 5-8 min    Lab Results   Component Value Date    WBC 15.99 (H) 10/25/2024    HGB 10.6 (L) 10/25/2024    HCT 32.0 (L) 10/25/2024     (H) 10/25/2024     Lab Results   Component Value Date    K 3.6 10/21/2024     10/21/2024    CO2 24 10/21/2024    BUN 8 10/21/2024    CREATININE 0.44 (L) 10/21/2024    AST 19 10/21/2024    ALT 8 10/21/2024    ALT 8 10/21/2024     Prenatal Labs: Reviewed      Blood type: B positive  Antibody: negative  GBS: positive  HIV: non-reactive  Rubella: immune  Syphilis IgM/IgG: non-reactive  HBsAg: non-reactive  HCAb: non-reactive  Chlamydia: negative  Gonorrhea: negative  Diabetes 1 hour screen: 93  3 hour glucose: not indicated  Platelets: 480  Hgb: 12.8    <2 Midnights  OBSERVATION        Signature/Title: Rosario Burks MD  Date: 10/26/2024  Time: 12:46 AM

## 2024-10-26 NOTE — PLAN OF CARE
Problem: ANTEPARTUM  Goal: Maintain pregnancy as long as maternal and/or fetal condition is stable  Description: INTERVENTIONS:  - Maternal surveillance  - Fetal surveillance  - Monitor uterine activity  - Medications as ordered  - Bedrest  Outcome: Progressing     Problem: PAIN - ADULT  Goal: Verbalizes/displays adequate comfort level or baseline comfort level  Description: Interventions:  - Encourage patient to monitor pain and request assistance  - Assess pain using appropriate pain scale  - Administer analgesics based on type and severity of pain and evaluate response  - Implement non-pharmacological measures as appropriate and evaluate response  - Consider cultural and social influences on pain and pain management  - Notify physician/advanced practitioner if interventions unsuccessful or patient reports new pain  Outcome: Progressing     Problem: INFECTION - ADULT  Goal: Absence or prevention of progression during hospitalization  Description: INTERVENTIONS:  - Assess and monitor for signs and symptoms of infection  - Monitor lab/diagnostic results  - Monitor all insertion sites, i.e. indwelling lines, tubes, and drains  - Monitor endotracheal if appropriate and nasal secretions for changes in amount and color  - [place] appropriate cooling/warming therapies per order  - Administer medications as ordered  - Instruct and encourage patient and family to use good hand hygiene technique  - Identify and instruct in appropriate isolation precautions for identified infection/condition  Outcome: Progressing  Goal: Absence of fever/infection during neutropenic period  Description: INTERVENTIONS:  - Monitor WBC    Outcome: Progressing     Problem: SAFETY ADULT  Goal: Patient will remain free of falls  Description: INTERVENTIONS:  - Educate patient/family on patient safety including physical limitations  - Instruct patient to call for assistance with activity   - Consult OT/PT to assist with strengthening/mobility   -  Keep Call bell within reach  - Keep bed low and locked with side rails adjusted as appropriate  - Keep care items and personal belongings within reach  - Initiate and maintain comfort rounds  - Make Fall Risk Sign visible to staff  - Offer Toileting every  Hours, in advance of need  - Initiate/Maintain alarm  - Obtain necessary fall risk management equipment:   - Apply yellow socks and bracelet for high fall risk patients  - Consider moving patient to room near nurses station  Outcome: Progressing  Goal: Maintain or return to baseline ADL function  Description: INTERVENTIONS:  -  Assess patient's ability to carry out ADLs; assess patient's baseline for ADL function and identify physical deficits which impact ability to perform ADLs (bathing, care of mouth/teeth, toileting, grooming, dressing, etc.)  - Assess/evaluate cause of self-care deficits   - Assess range of motion  - Assess patient's mobility; develop plan if impaired  - Assess patient's need for assistive devices and provide as appropriate  - Encourage maximum independence but intervene and supervise when necessary  - Involve family in performance of ADLs  - Assess for home care needs following discharge   - Consider OT consult to assist with ADL evaluation and planning for discharge  - Provide patient education as appropriate  Outcome: Progressing  Goal: Maintains/Returns to pre admission functional level  Description: INTERVENTIONS:  - Perform AM-PAC 6 Click Basic Mobility/ Daily Activity assessment daily.  - Set and communicate daily mobility goal to care team and patient/family/caregiver.   - Collaborate with rehabilitation services on mobility goals if consulted  - Perform Range of Motion  times a day.  - Reposition patient every  hours.  - Dangle patient  times a day  - Stand patient  times a day  - Ambulate patient  times a day  - Out of bed to chair  times a day   - Out of bed for meals times a day  - Out of bed for toileting  - Record patient  progress and toleration of activity level   Outcome: Progressing     Problem: Knowledge Deficit  Goal: Patient/family/caregiver demonstrates understanding of disease process, treatment plan, medications, and discharge instructions  Description: Complete learning assessment and assess knowledge base.  Interventions:  - Provide teaching at level of understanding  - Provide teaching via preferred learning methods  Outcome: Progressing     Problem: DISCHARGE PLANNING  Goal: Discharge to home or other facility with appropriate resources  Description: INTERVENTIONS:  - Identify barriers to discharge w/patient and caregiver  - Arrange for needed discharge resources and transportation as appropriate  - Identify discharge learning needs (meds, wound care, etc.)  - Arrange for interpretive services to assist at discharge as needed  - Refer to Case Management Department for coordinating discharge planning if the patient needs post-hospital services based on physician/advanced practitioner order or complex needs related to functional status, cognitive ability, or social support system  Outcome: Progressing

## 2024-10-26 NOTE — PROGRESS NOTES
Repeat SVE patient is unchanged.  Will give second dose of betamethasone at this time and discharge patient home.  Discussed with Dr. Coyle and Dr. Jo Gao MD  OBGYN PGY-3  10/26/2024 10:36 AM

## 2024-10-26 NOTE — PLAN OF CARE
Problem: ANTEPARTUM  Goal: Maintain pregnancy as long as maternal and/or fetal condition is stable  Description: INTERVENTIONS:  - Maternal surveillance  - Fetal surveillance  - Monitor uterine activity  - Medications as ordered  - Bedrest  10/26/2024 1146 by Keyur Nunez RN  Outcome: Adequate for Discharge  10/26/2024 0757 by Keyur Nunez RN  Outcome: Progressing     Problem: PAIN - ADULT  Goal: Verbalizes/displays adequate comfort level or baseline comfort level  Description: Interventions:  - Encourage patient to monitor pain and request assistance  - Assess pain using appropriate pain scale  - Administer analgesics based on type and severity of pain and evaluate response  - Implement non-pharmacological measures as appropriate and evaluate response  - Consider cultural and social influences on pain and pain management  - Notify physician/advanced practitioner if interventions unsuccessful or patient reports new pain  10/26/2024 1146 by Keyur Nunez RN  Outcome: Adequate for Discharge  10/26/2024 0757 by Keyur Nunez RN  Outcome: Progressing     Problem: INFECTION - ADULT  Goal: Absence or prevention of progression during hospitalization  Description: INTERVENTIONS:  - Assess and monitor for signs and symptoms of infection  - Monitor lab/diagnostic results  - Monitor all insertion sites, i.e. indwelling lines, tubes, and drains  - Monitor endotracheal if appropriate and nasal secretions for changes in amount and color  - [place] appropriate cooling/warming therapies per order  - Administer medications as ordered  - Instruct and encourage patient and family to use good hand hygiene technique  - Identify and instruct in appropriate isolation precautions for identified infection/condition  10/26/2024 1146 by Keyur Nunez RN  Outcome: Adequate for Discharge  10/26/2024 0757 by Keyur Nunez RN  Outcome: Progressing  Goal: Absence of  fever/infection during neutropenic period  Description: INTERVENTIONS:  - Monitor WBC    10/26/2024 1146 by Keyur Nunez RN  Outcome: Adequate for Discharge  10/26/2024 0757 by Keyur Nunez RN  Outcome: Progressing     Problem: SAFETY ADULT  Goal: Patient will remain free of falls  Description: INTERVENTIONS:  - Educate patient/family on patient safety including physical limitations  - Instruct patient to call for assistance with activity   - Consult OT/PT to assist with strengthening/mobility   - Keep Call bell within reach  - Keep bed low and locked with side rails adjusted as appropriate  - Keep care items and personal belongings within reach  - Initiate and maintain comfort rounds  10/26/2024 1146 by Keyur Nunez RN  Outcome: Adequate for Discharge  10/26/2024 0757 by Keyur Nunez RN  Outcome: Progressing  Goal: Maintain or return to baseline ADL function  Description: INTERVENTIONS:  -  Assess patient's ability to carry out ADLs; assess patient's baseline for ADL function and identify physical deficits which impact ability to perform ADLs (bathing, care of mouth/teeth, toileting, grooming, dressing, etc.)  - Assess/evaluate cause of self-care deficits   - Assess range of motion  - Assess patient's mobility; develop plan if impaired  - Assess patient's need for assistive devices and provide as appropriate  - Encourage maximum independence but intervene and supervise when necessary  - Involve family in performance of ADLs  - Assess for home care needs following discharge   - Consider OT consult to assist with ADL evaluation and planning for discharge  - Provide patient education as appropriate  10/26/2024 1146 by Keyur Nunez RN  Outcome: Adequate for Discharge  10/26/2024 0757 by Keyur Nunez RN  Outcome: Progressing  Goal: Maintains/Returns to pre admission functional level  Description: INTERVENTIONS:  - Perform AM-PAC 6 Click Basic  Mobility/ Daily Activity assessment daily.  - Set and communicate daily mobility goal to care team and patient/family/caregiver.   - Out of bed for toileting  - Record patient progress and toleration of activity level   10/26/2024 1146 by Keyur Nunez RN  Outcome: Adequate for Discharge  10/26/2024 0757 by Keyur Nunez RN  Outcome: Progressing     Problem: Knowledge Deficit  Goal: Patient/family/caregiver demonstrates understanding of disease process, treatment plan, medications, and discharge instructions  Description: Complete learning assessment and assess knowledge base.  Interventions:  - Provide teaching at level of understanding  - Provide teaching via preferred learning methods  10/26/2024 1146 by Keyur Nunez RN  Outcome: Adequate for Discharge  10/26/2024 0757 by Keyur Nunez RN  Outcome: Progressing     Problem: DISCHARGE PLANNING  Goal: Discharge to home or other facility with appropriate resources  Description: INTERVENTIONS:  - Identify barriers to discharge w/patient and caregiver  - Arrange for needed discharge resources and transportation as appropriate  - Identify discharge learning needs (meds, wound care, etc.)  - Arrange for interpretive services to assist at discharge as needed  - Refer to Case Management Department for coordinating discharge planning if the patient needs post-hospital services based on physician/advanced practitioner order or complex needs related to functional status, cognitive ability, or social support system  10/26/2024 1146 by Keyur Nunez RN  Outcome: Adequate for Discharge  10/26/2024 0757 by Keyur Nunez RN  Outcome: Progressing

## 2024-10-26 NOTE — PLAN OF CARE
Problem: ANTEPARTUM  Goal: Maintain pregnancy as long as maternal and/or fetal condition is stable  Description: INTERVENTIONS:  - Maternal surveillance  - Fetal surveillance  - Monitor uterine activity  - Medications as ordered  - Bedrest  Outcome: Progressing     Problem: PAIN - ADULT  Goal: Verbalizes/displays adequate comfort level or baseline comfort level  Description: Interventions:  - Encourage patient to monitor pain and request assistance  - Assess pain using appropriate pain scale  - Administer analgesics based on type and severity of pain and evaluate response  - Implement non-pharmacological measures as appropriate and evaluate response  - Consider cultural and social influences on pain and pain management  - Notify physician/advanced practitioner if interventions unsuccessful or patient reports new pain  Outcome: Progressing     Problem: INFECTION - ADULT  Goal: Absence or prevention of progression during hospitalization  Description: INTERVENTIONS:  - Assess and monitor for signs and symptoms of infection  - Monitor lab/diagnostic results  - Monitor all insertion sites, i.e. indwelling lines, tubes, and drains  - Monitor endotracheal if appropriate and nasal secretions for changes in amount and color  - [place] appropriate cooling/warming therapies per order  - Administer medications as ordered  - Instruct and encourage patient and family to use good hand hygiene technique  - Identify and instruct in appropriate isolation precautions for identified infection/condition  Outcome: Progressing  Goal: Absence of fever/infection during neutropenic period  Description: INTERVENTIONS:  - Monitor WBC    Outcome: Progressing     Problem: SAFETY ADULT  Goal: Patient will remain free of falls  Description: INTERVENTIONS:  - Educate patient/family on patient safety including physical limitations  - Instruct patient to call for assistance with activity   - Consult OT/PT to assist with strengthening/mobility   -  Keep Call bell within reach  - Keep bed low and locked with side rails adjusted as appropriate  - Keep care items and personal belongings within reach  - Initiate and maintain comfort rounds  Outcome: Progressing  Goal: Maintain or return to baseline ADL function  Description: INTERVENTIONS:  -  Assess patient's ability to carry out ADLs; assess patient's baseline for ADL function and identify physical deficits which impact ability to perform ADLs (bathing, care of mouth/teeth, toileting, grooming, dressing, etc.)  - Assess/evaluate cause of self-care deficits   - Assess range of motion  - Assess patient's mobility; develop plan if impaired  - Assess patient's need for assistive devices and provide as appropriate  - Encourage maximum independence but intervene and supervise when necessary  - Involve family in performance of ADLs  - Assess for home care needs following discharge   - Consider OT consult to assist with ADL evaluation and planning for discharge  - Provide patient education as appropriate  Outcome: Progressing  Goal: Maintains/Returns to pre admission functional level  Description: INTERVENTIONS:  - Perform AM-PAC 6 Click Basic Mobility/ Daily Activity assessment daily.  - Set and communicate daily mobility goal to care team and patient/family/caregiver.   - Out of bed for toileting  - Record patient progress and toleration of activity level   Outcome: Progressing     Problem: Knowledge Deficit  Goal: Patient/family/caregiver demonstrates understanding of disease process, treatment plan, medications, and discharge instructions  Description: Complete learning assessment and assess knowledge base.  Interventions:  - Provide teaching at level of understanding  - Provide teaching via preferred learning methods  Outcome: Progressing     Problem: DISCHARGE PLANNING  Goal: Discharge to home or other facility with appropriate resources  Description: INTERVENTIONS:  - Identify barriers to discharge w/patient and  caregiver  - Arrange for needed discharge resources and transportation as appropriate  - Identify discharge learning needs (meds, wound care, etc.)  - Arrange for interpretive services to assist at discharge as needed  - Refer to Case Management Department for coordinating discharge planning if the patient needs post-hospital services based on physician/advanced practitioner order or complex needs related to functional status, cognitive ability, or social support system  Outcome: Progressing

## 2024-10-26 NOTE — ASSESSMENT & PLAN NOTE
Patient presented to [facility] for  contractions  SVE at [facility] made change from 1 cm to 1 cm & 50% effacement with positive FFN  Transferred to [facility] for possible  labor  SVE on admission at [facility] unchanged at 1.5/50/-3  NST reactive  [place] with contractions every 5-8 min  Cephalic on US at [facility] immediately prior to transfer    Admit for observation for  contractions and positive FFN concerning for possible  labor  Betamethasone for fetal lung maturation 10/25 - 10/26 started at , continued at [facility]  Procardia for tocolysis started at , continued at [facility]  Will defer Magnesium for fetal neuroprotection and Penicillin for GBS prophylaxis given lower concern for  labor given lack of cervical change after transfer, though can start these if indicated at any point  Monitoring: continuous  FEN: reg diet, IVF  DVT ppx: SCDs

## 2024-10-26 NOTE — ASSESSMENT & PLAN NOTE
2022 G1: developed gHTN at 38w then became PreE w/ SF treated w/ 12 hr mag & Procardia until 6w PP  Normotensive this pregnancy

## 2024-10-28 ENCOUNTER — PATIENT MESSAGE (OUTPATIENT)
Dept: OBGYN CLINIC | Facility: CLINIC | Age: 34
End: 2024-10-28

## 2024-10-30 ENCOUNTER — PATIENT OUTREACH (OUTPATIENT)
Dept: OBGYN CLINIC | Facility: CLINIC | Age: 34
End: 2024-10-30

## 2024-10-30 NOTE — PROGRESS NOTES
SW called patient for 3 month f/u - no answer. SW left VM requesting a return call. Patient's next appointment is scheduled for 11/1/24 when SW will be OOO.

## 2024-11-01 ENCOUNTER — ROUTINE PRENATAL (OUTPATIENT)
Dept: OBGYN CLINIC | Facility: CLINIC | Age: 34
End: 2024-11-01
Payer: COMMERCIAL

## 2024-11-01 VITALS
DIASTOLIC BLOOD PRESSURE: 70 MMHG | SYSTOLIC BLOOD PRESSURE: 118 MMHG | HEIGHT: 60 IN | WEIGHT: 167 LBS | BODY MASS INDEX: 32.79 KG/M2

## 2024-11-01 DIAGNOSIS — E03.9 HYPOTHYROIDISM COMPLICATING PREGNANCY, THIRD TRIMESTER: ICD-10-CM

## 2024-11-01 DIAGNOSIS — O47.00 PRETERM CONTRACTIONS: ICD-10-CM

## 2024-11-01 DIAGNOSIS — O99.283 HYPOTHYROIDISM COMPLICATING PREGNANCY, THIRD TRIMESTER: ICD-10-CM

## 2024-11-01 DIAGNOSIS — Z3A.31 31 WEEKS GESTATION OF PREGNANCY: Primary | ICD-10-CM

## 2024-11-01 DIAGNOSIS — D50.8 IRON DEFICIENCY ANEMIA SECONDARY TO INADEQUATE DIETARY IRON INTAKE: ICD-10-CM

## 2024-11-01 DIAGNOSIS — F32.A DEPRESSION AFFECTING PREGNANCY IN THIRD TRIMESTER, ANTEPARTUM: ICD-10-CM

## 2024-11-01 DIAGNOSIS — O99.343 DEPRESSION AFFECTING PREGNANCY IN THIRD TRIMESTER, ANTEPARTUM: ICD-10-CM

## 2024-11-01 DIAGNOSIS — Z87.59 HISTORY OF SEVERE PRE-ECLAMPSIA: ICD-10-CM

## 2024-11-01 LAB
SL AMB  POCT GLUCOSE, UA: NORMAL
SL AMB POCT URINE PROTEIN: NORMAL

## 2024-11-01 PROCEDURE — 81002 URINALYSIS NONAUTO W/O SCOPE: CPT | Performed by: OBSTETRICS & GYNECOLOGY

## 2024-11-01 PROCEDURE — PNV: Performed by: OBSTETRICS & GYNECOLOGY

## 2024-11-01 NOTE — PROGRESS NOTES
Routine Prenatal Visit  [facility] - [address], [address], [place] [address]    Assessment/Plan:  China is a 34 y.o. year old  at 31w4d who presents for routine prenatal visit.     1. 31 weeks gestation of pregnancy  -     POCT urine dip  2. Hypothyroidism complicating pregnancy, third trimester  3. Depression affecting pregnancy in third trimester, antepartum  4. History of severe pre-eclampsia  5. Iron deficiency anemia secondary to inadequate dietary iron intake  6.  contractions       -  Pt was evaluated for PTL at  and then transferred to [facility] on 10/25/24 after FFN returned positive and cervix changed from 1 to 1.5 cm.  Patient was discharge from [facility] with precautions.  Patient states she feels tightening of her abdomen but does not does not have any pain or cramping with it.  Denies having LOF or vaginal bleeding.      -  Si/sx of PTL, LOF, KK, and VB reviewed.  Precautions given    Comments:  Betamethasone given 10/25-10/26      Next OB Visit 2 weeks.    Subjective:     CC: Prenatal care    China Scott is a 34 y.o.  female who presents for routine prenatal care at 31w4d.  Pregnancy ROS: no leakage of fluid, pelvic pain, or vaginal bleeding.  normal fetal movement.    The following portions of the patient's history were reviewed and updated as appropriate: allergies, current medications, past family history, past medical history, obstetric history, gynecologic history, past social history, past surgical history and problem list.      Objective:  /70 (BP Location: Left arm, Patient Position: Sitting, Cuff Size: Standard)   Ht 5' (1.524 m)   Wt 75.8 kg (167 lb)   LMP 2024 (Exact Date)   BMI 32.61 kg/m²   Pregravid Weight/BMI: 55.3 kg (122 lb) (BMI 23.83)  Current Weight: 75.8 kg (167 lb)   Total Weight Gain: 20.4 kg (45 lb)   Pre- Vitals      Flowsheet Row Most Recent Value   Prenatal Assessment    Fetal Heart Rate 140   Fundal  Height (cm) 32 cm   Movement Present   Presentation Vertex   Prenatal Vitals    Blood Pressure 118/70   Weight - Scale 75.8 kg (167 lb)   Urine Albumin/Glucose    Dilation/Effacement/Station    Cervical Dilation 1.5   Cervical Effacement 50   Fetal Station -2   Vaginal Drainage    Draining Fluid No   Edema    LLE Edema None             General: Well appearing, no distress  Abdomen: Soft, gravid, nontender  Extremities: Non tender.

## 2024-11-06 ENCOUNTER — PATIENT OUTREACH (OUTPATIENT)
Dept: OBGYN CLINIC | Facility: CLINIC | Age: 34
End: 2024-11-06

## 2024-11-06 NOTE — LETTER
November 6, 2024    China Donahuen  [address] [address]        Isabela Atkinson,      I hope you are doing well. I am the  at [facility], we met together back at the start of your pregnancy. I am reaching out because I have made a few attempts to contact you by phone. Unfortunately, I have not been able to reach you! I apologize if I have been calling at a bad time.       If you are interested in social work services or resources, I kindly request that you contact me at [phone] so that I can assist with your care needs. My normal hours are Monday through Friday, 8:00am to 4:30pm.        Take care,    JOSE ANGEL Franklin, ZULLYW

## 2024-11-06 NOTE — PROGRESS NOTES
SW called patient for 3 month f/u - no answer. SW left VM requesting a return call. Patient's next appointment is scheduled for 11/8/24 when SW will be OOO.     Due to lack of response from patient, SW sent UTR letter via [facility]. SW closing referral at this time, please re-refer as needed.

## 2024-11-08 ENCOUNTER — ROUTINE PRENATAL (OUTPATIENT)
Dept: OBGYN CLINIC | Facility: CLINIC | Age: 34
End: 2024-11-08
Payer: COMMERCIAL

## 2024-11-08 VITALS
BODY MASS INDEX: 32.2 KG/M2 | SYSTOLIC BLOOD PRESSURE: 124 MMHG | DIASTOLIC BLOOD PRESSURE: 72 MMHG | WEIGHT: 164 LBS | HEIGHT: 60 IN

## 2024-11-08 DIAGNOSIS — R82.71 GBS BACTERIURIA: ICD-10-CM

## 2024-11-08 DIAGNOSIS — Z3A.32 32 WEEKS GESTATION OF PREGNANCY: Primary | ICD-10-CM

## 2024-11-08 DIAGNOSIS — Z3A.18 18 WEEKS GESTATION OF PREGNANCY: ICD-10-CM

## 2024-11-08 DIAGNOSIS — D50.8 IRON DEFICIENCY ANEMIA SECONDARY TO INADEQUATE DIETARY IRON INTAKE: ICD-10-CM

## 2024-11-08 DIAGNOSIS — Z87.59 HISTORY OF SEVERE PRE-ECLAMPSIA: ICD-10-CM

## 2024-11-08 DIAGNOSIS — O99.283 HYPOTHYROIDISM COMPLICATING PREGNANCY, THIRD TRIMESTER: ICD-10-CM

## 2024-11-08 DIAGNOSIS — O99.343 DEPRESSION AFFECTING PREGNANCY IN THIRD TRIMESTER, ANTEPARTUM: ICD-10-CM

## 2024-11-08 DIAGNOSIS — F32.A DEPRESSION AFFECTING PREGNANCY IN THIRD TRIMESTER, ANTEPARTUM: ICD-10-CM

## 2024-11-08 DIAGNOSIS — E03.9 HYPOTHYROIDISM COMPLICATING PREGNANCY, THIRD TRIMESTER: ICD-10-CM

## 2024-11-08 LAB
SL AMB  POCT GLUCOSE, UA: NORMAL
SL AMB POCT URINE PROTEIN: NORMAL

## 2024-11-08 PROCEDURE — 81002 URINALYSIS NONAUTO W/O SCOPE: CPT | Performed by: OBSTETRICS & GYNECOLOGY

## 2024-11-08 PROCEDURE — PNV: Performed by: OBSTETRICS & GYNECOLOGY

## 2024-11-08 NOTE — PROGRESS NOTES
Routine Prenatal Visit  [facility] [address], [address], [place] [address]    Assessment/Plan:  China is a 34 y.o. year old  at 32w4d who presents for routine prenatal visit.     1. 32 weeks gestation of pregnancy  -     POCT urine dip  2. Hypothyroidism complicating pregnancy, third trimester  3. Depression affecting pregnancy in third trimester, antepartum  4. History of severe pre-eclampsia  5. Transfer @ 18 weeks  6. Iron deficiency anemia secondary to inadequate dietary iron intake  7. GBS bacteriuria        Subjective:     CC: Prenatal care    China Scott is a 34 y.o.  female who presents for routine prenatal care at 32w4d.  Pregnancy ROS: no  leakage of fluid, pelvic pain, or vaginal bleeding.  + fetal movement.    The following portions of the patient's history were reviewed and updated as appropriate: allergies, current medications, past family history, past medical history, obstetric history, gynecologic history, past social history, past surgical history and problem list.  Pt went to wrong office today.  + hx of  PTL.   Irregular  UTCX now  .  No  chg in dc  no leaking of fluid  + fm.   Vaginal rest,   hydration and  low to moderate activity dw pt .  Going down to 2 days  a week at work  .  Fu  in  one week with physician     Objective:  /72 (BP Location: Left arm, Patient Position: Sitting, Cuff Size: Standard)   Ht 5' (1.524 m)   Wt 74.4 kg (164 lb)   LMP 2024 (Exact Date)   BMI 32.03 kg/m²   Pregravid Weight/BMI: 55.3 kg (122 lb) (BMI 23.83)  Current Weight: 74.4 kg (164 lb)   Total Weight Gain: 19.1 kg (42 lb)   Pre- Vitals      Flowsheet Row Most Recent Value   Prenatal Assessment    Fetal Heart Rate 156   Fundal Height (cm) 33 cm   Movement Present   Prenatal Vitals    Blood Pressure 124/72   Weight - Scale 74.4 kg (164 lb)   Urine Albumin/Glucose    Dilation/Effacement/Station    Vaginal Drainage    Draining Fluid No   Edema    LLE  Edema None   RLE Edema None   Facial Edema None             General: Well appearing, no distress  Respiratory: Unlabored breathing  Cardiovascular: Regular rate.  Abdomen: Soft, gravid, nontender  Fundal Height: Appropriate for gestational age.  Extremities: Warm and well perfused.  Non tender.

## 2024-11-08 NOTE — PATIENT INSTRUCTIONS
NUTRITION IN PREGNANCY  Good Nutrition is a VERY important part of having a healthy pregnancy and healthy baby.  You should follow a healthy diet which include the following:   * Vegetables (which are dark green and leafy): at least 2 servings each day   * Protein (meat, eggs, beans, nuts, peanut butter): 3-4 servings each day   * Breads/whole grains (bread, pasta, rice, tortillas, potatoes): 3 servings each day   * Dairy (milk, yogurt, cheese): 3-4 servings each day   * Water: 6-8 glasses per day   * Calories: approximately 2000 to 2200 calories per day     WEIGHT GAIN   Recommended weight gain for you during your pregnancy is based on your body mass index (BMI) at the time that you became pregnant.   Pre-pregnant BMI Recommended weight gain   Underweight (BMI less than 18.5) 28 to 40 pounds   Normal weight (BMI 18.5-24.9) 25 to 35 pounds   Overweight (BMI 25-29.9) 15 to 25 pounds   Obese (BMI 30 or greater) 11 to 20 pounds     FOOD SAFETY   It is VERY important to eat only safely-prepared foods during pregnancy as you and your baby have a higher risk than usual for being affected by foodborne illnesses.  Follow these steps to ensure that you and your baby are safe from foodborne illnesses while you are pregnant:   wash hands thoroughly with warm water and soap before and after handling any foods   wash cutting boards, dishes, utensils, and countertops with hot water and soap before and after preparing any foods   rinse raw fruits and vegetables thoroughly under running water before eating   keep raw meat and seafood separate from other foods and use different cutting boards/utensils to handle raw meat than for other foods   put cooked food on a freshly clean plate   cook all of your foods thoroughly   discard foods that have been left out for more than 2 hours   refrigerate or freeze any foods than can spoil     There are three particular foodborne risks that you should be aware of and avoid as they can cause  serious harm to your unborn child.     * Listeria (a harmful bacteria)   don’t eat hot dogs or deli meats (unless they’re reheated until steaming hot)   don’t eat soft cheeses (such as Feta, Brie, Camembert) unless they are specifically labeled as being “made with pasteurized milk”   don’t drink raw (unpasteurized) milk   don’t eat refrigerated pates or meat spreads   don’t eat refrigerated smoked seafood unless it’s in a cooked dish like a casserole     * Mercury (a metal which is found in certain fish in high levels)   don’t eat shark, tilefish, donna mackerel, or swordfish   don’t eat more than 12 ounces per week of shrimp, salmon, pollock, or catfish   when eating tuna fish, you can have up to 6 ounces per week of canned albacore tuna OR up to 12 ounces of canned light tuna     * Toxoplasma (a harmful parasite)   cook meat thoroughly before eating   wear gloves when gardening or handling sand from a child’s sandbox   if you ha tve a cat, have someone else change the litter box while you are pregnant.    if you HAVE to clean it yourself, be sure to wash your hands thoroughly afterwards with warm water and soap.   don’t get a NEW cat while you are pregnant

## 2024-11-09 ENCOUNTER — NURSE TRIAGE (OUTPATIENT)
Dept: OTHER | Facility: OTHER | Age: 34
End: 2024-11-09

## 2024-11-09 ENCOUNTER — HOSPITAL ENCOUNTER (OUTPATIENT)
Facility: HOSPITAL | Age: 34
Setting detail: OBSERVATION
Discharge: HOME | End: 2024-11-10
Attending: OBSTETRICS & GYNECOLOGY | Admitting: OBSTETRICS & GYNECOLOGY
Payer: COMMERCIAL

## 2024-11-09 DIAGNOSIS — O60.00 PRETERM LABOR: Primary | ICD-10-CM

## 2024-11-09 PROBLEM — O60.03 PRETERM LABOR IN THIRD TRIMESTER: Status: ACTIVE | Noted: 2024-11-09

## 2024-11-09 PROCEDURE — 86780 TREPONEMA PALLIDUM: CPT | Performed by: OBSTETRICS & GYNECOLOGY

## 2024-11-09 PROCEDURE — 85027 COMPLETE CBC AUTOMATED: CPT | Performed by: OBSTETRICS & GYNECOLOGY

## 2024-11-09 PROCEDURE — 87150 DNA/RNA AMPLIFIED PROBE: CPT | Performed by: OBSTETRICS & GYNECOLOGY

## 2024-11-09 PROCEDURE — 86901 BLOOD TYPING SEROLOGIC RH(D): CPT | Performed by: OBSTETRICS & GYNECOLOGY

## 2024-11-09 PROCEDURE — 86850 RBC ANTIBODY SCREEN: CPT | Performed by: OBSTETRICS & GYNECOLOGY

## 2024-11-09 PROCEDURE — 85007 BL SMEAR W/DIFF WBC COUNT: CPT | Performed by: OBSTETRICS & GYNECOLOGY

## 2024-11-09 PROCEDURE — NC001 PR NO CHARGE: Performed by: OBSTETRICS & GYNECOLOGY

## 2024-11-09 PROCEDURE — 86900 BLOOD TYPING SEROLOGIC ABO: CPT | Performed by: OBSTETRICS & GYNECOLOGY

## 2024-11-09 RX ORDER — NIFEDIPINE 10 MG/1
20 CAPSULE ORAL ONCE
Status: COMPLETED | OUTPATIENT
Start: 2024-11-09 | End: 2024-11-09

## 2024-11-09 RX ORDER — LEVOTHYROXINE SODIUM 75 UG/1
75 TABLET ORAL
Status: DISCONTINUED | OUTPATIENT
Start: 2024-11-10 | End: 2024-11-10 | Stop reason: HOSPADM

## 2024-11-09 RX ORDER — ONDANSETRON 2 MG/ML
4 INJECTION INTRAMUSCULAR; INTRAVENOUS EVERY 6 HOURS PRN
Status: DISCONTINUED | OUTPATIENT
Start: 2024-11-09 | End: 2024-11-10 | Stop reason: HOSPADM

## 2024-11-09 RX ORDER — DIPHENHYDRAMINE HCL 25 MG
25 TABLET ORAL
Status: DISCONTINUED | OUTPATIENT
Start: 2024-11-09 | End: 2024-11-10 | Stop reason: HOSPADM

## 2024-11-09 RX ORDER — SODIUM CHLORIDE, SODIUM LACTATE, POTASSIUM CHLORIDE, CALCIUM CHLORIDE 600; 310; 30; 20 MG/100ML; MG/100ML; MG/100ML; MG/100ML
125 INJECTION, SOLUTION INTRAVENOUS CONTINUOUS
Status: DISCONTINUED | OUTPATIENT
Start: 2024-11-09 | End: 2024-11-10 | Stop reason: HOSPADM

## 2024-11-09 RX ORDER — BUPIVACAINE HYDROCHLORIDE 2.5 MG/ML
30 INJECTION, SOLUTION EPIDURAL; INFILTRATION; INTRACAUDAL ONCE AS NEEDED
Status: DISCONTINUED | OUTPATIENT
Start: 2024-11-09 | End: 2024-11-10 | Stop reason: HOSPADM

## 2024-11-09 RX ADMIN — SODIUM CHLORIDE, SODIUM LACTATE, POTASSIUM CHLORIDE, AND CALCIUM CHLORIDE 1000 ML: .6; .31; .03; .02 INJECTION, SOLUTION INTRAVENOUS at 22:42

## 2024-11-09 RX ADMIN — DIPHENHYDRAMINE HYDROCHLORIDE 25 MG: 25 TABLET ORAL at 23:34

## 2024-11-09 RX ADMIN — NIFEDIPINE 20 MG: 10 CAPSULE ORAL at 22:41

## 2024-11-09 RX ADMIN — SODIUM CHLORIDE 5 MILLION UNITS: 0.9 INJECTION, SOLUTION INTRAVENOUS at 22:59

## 2024-11-10 VITALS
BODY MASS INDEX: 32.2 KG/M2 | TEMPERATURE: 97.9 F | HEIGHT: 60 IN | OXYGEN SATURATION: 97 % | DIASTOLIC BLOOD PRESSURE: 67 MMHG | SYSTOLIC BLOOD PRESSURE: 112 MMHG | HEART RATE: 92 BPM | RESPIRATION RATE: 18 BRPM | WEIGHT: 164 LBS

## 2024-11-10 LAB
ABO GROUP BLD: NORMAL
BASOPHILS # BLD MANUAL: 0.13 THOUSAND/UL (ref 0–0.1)
BASOPHILS NFR MAR MANUAL: 1 % (ref 0–1)
BLD GP AB SCN SERPL QL: NEGATIVE
EOSINOPHIL # BLD MANUAL: 0.4 THOUSAND/UL (ref 0–0.4)
EOSINOPHIL NFR BLD MANUAL: 3 % (ref 0–6)
ERYTHROCYTE [DISTWIDTH] IN BLOOD BY AUTOMATED COUNT: 13.5 % (ref 11.6–15.1)
HCT VFR BLD AUTO: 32.5 % (ref 34.8–46.1)
HGB BLD-MCNC: 10.5 G/DL (ref 11.5–15.4)
HOLD SPECIMEN: NORMAL
LYMPHOCYTES # BLD AUTO: 29 % (ref 14–44)
LYMPHOCYTES # BLD AUTO: 5.11 THOUSAND/UL (ref 0.6–4.47)
MCH RBC QN AUTO: 29.4 PG (ref 26.8–34.3)
MCHC RBC AUTO-ENTMCNC: 32.3 G/DL (ref 31.4–37.4)
MCV RBC AUTO: 91 FL (ref 82–98)
MONOCYTES # BLD AUTO: 0.81 THOUSAND/UL (ref 0–1.22)
MONOCYTES NFR BLD: 6 % (ref 4–12)
NEUTROPHILS # BLD MANUAL: 6.99 THOUSAND/UL (ref 1.85–7.62)
NEUTS SEG NFR BLD AUTO: 52 % (ref 43–75)
PLATELET # BLD AUTO: 420 THOUSANDS/UL (ref 149–390)
PLATELET BLD QL SMEAR: ADEQUATE
PMV BLD AUTO: 10 FL (ref 8.9–12.7)
POLYCHROMASIA BLD QL SMEAR: PRESENT
RBC # BLD AUTO: 3.57 MILLION/UL (ref 3.81–5.12)
RBC MORPH BLD: PRESENT
RH BLD: POSITIVE
SPECIMEN EXPIRATION DATE: NORMAL
TREPONEMA PALLIDUM IGG+IGM AB [PRESENCE] IN SERUM OR PLASMA BY IMMUNOASSAY: NORMAL
VARIANT LYMPHS # BLD AUTO: 9 %
WBC # BLD AUTO: 13.45 THOUSAND/UL (ref 4.31–10.16)

## 2024-11-10 PROCEDURE — NC001 PR NO CHARGE: Performed by: OBSTETRICS & GYNECOLOGY

## 2024-11-10 PROCEDURE — 99214 OFFICE O/P EST MOD 30 MIN: CPT

## 2024-11-10 RX ORDER — NIFEDIPINE 10 MG/1
20 CAPSULE ORAL ONCE
Status: COMPLETED | OUTPATIENT
Start: 2024-11-10 | End: 2024-11-10

## 2024-11-10 RX ORDER — NIFEDIPINE 10 MG/1
20 CAPSULE ORAL EVERY 6 HOURS
Status: DISCONTINUED | OUTPATIENT
Start: 2024-11-10 | End: 2024-11-10 | Stop reason: HOSPADM

## 2024-11-10 RX ORDER — ACETAMINOPHEN 325 MG/1
650 TABLET ORAL EVERY 6 HOURS PRN
Status: DISCONTINUED | OUTPATIENT
Start: 2024-11-10 | End: 2024-11-10 | Stop reason: HOSPADM

## 2024-11-10 RX ORDER — OXYCODONE HYDROCHLORIDE 5 MG/1
5 TABLET ORAL ONCE
Status: COMPLETED | OUTPATIENT
Start: 2024-11-10 | End: 2024-11-10

## 2024-11-10 RX ADMIN — SODIUM CHLORIDE 2.5 MILLION UNITS: 9 INJECTION, SOLUTION INTRAVENOUS at 07:12

## 2024-11-10 RX ADMIN — ACETAMINOPHEN 650 MG: 325 TABLET, FILM COATED ORAL at 03:31

## 2024-11-10 RX ADMIN — SODIUM CHLORIDE, SODIUM LACTATE, POTASSIUM CHLORIDE, AND CALCIUM CHLORIDE 450 ML/HR: .6; .31; .03; .02 INJECTION, SOLUTION INTRAVENOUS at 05:36

## 2024-11-10 RX ADMIN — SODIUM CHLORIDE, SODIUM LACTATE, POTASSIUM CHLORIDE, AND CALCIUM CHLORIDE 125 ML/HR: .6; .31; .03; .02 INJECTION, SOLUTION INTRAVENOUS at 01:08

## 2024-11-10 RX ADMIN — OXYCODONE HYDROCHLORIDE 5 MG: 5 TABLET ORAL at 07:18

## 2024-11-10 RX ADMIN — LEVOTHYROXINE SODIUM 75 MCG: 75 TABLET ORAL at 06:42

## 2024-11-10 RX ADMIN — NIFEDIPINE 20 MG: 10 CAPSULE ORAL at 00:40

## 2024-11-10 RX ADMIN — SODIUM CHLORIDE 2.5 MILLION UNITS: 9 INJECTION, SOLUTION INTRAVENOUS at 03:15

## 2024-11-10 NOTE — QUICK NOTE
"Patient was treated 5 days ago for an ear infection.  Has been on Amoxicillin and ofloxcin ear drops since.  Last night parents noted child was \"wobbly\" and more drainage from left ear.  " No change in cervix.  Contractions q 5-10, no stronger  Will continue to watch for now  Has been seen by Neonatology  Continue IV fluids  Had second dose nifedipine at 90 minutes after first  Will place order for q 6 hr nifedipine    Cat 1 tracing- reassuring

## 2024-11-10 NOTE — H&P
OB H&P  China Donahuen  1990  LD TRIAGE 03/LD TRIAGE 3*          34 y.o. yo  at 32 weeks by us and lmp      Assessment:/Plan:     IUP at 32 weeks.   GBS unknown   GBS culture sent   Begin PCN therapy   labor   IVF now   Nifedipine 20  mg now, may repeat in 90  min if ctx persist  Hypothyroidism   Will order replacement tx  History preE with SF   BP tonight good   On ASA tx    Chief complaint:  contractions    HPI:  Contractions:  yes, irregular since discharge from [facility] 2 weeks ago, pt noted increase today, approx 7/hr per pt.  Pt was sent to [facility] 2 weeks ago for progress from 1 cm to 1.5 cm and 50% effaced with + fFN.  Had full course BMZ then.      Fetal movement:  yes  Vaginal bleeding:   no  Leaking of fluid:  no      Pregnancy Complications:  Patient Active Problem List   Diagnosis    Hypothyroidism complicating pregnancy, third trimester    32 weeks gestation of pregnancy    Depression affecting pregnancy in third trimester, antepartum    History of severe pre-eclampsia    Transfer @ 18 weeks    Iron deficiency anemia secondary to inadequate dietary iron intake    Numbness and tingling     contractions    GBS bacteriuria         Past Medical History:  Past Medical History:   Diagnosis Date    Anxiety     Cholelithiasis with acute cholecystitis 2024    History of cold sores     History of immune thrombocytopenia 2010    Hydronephrosis of right kidney 2022    Migraines     without Aura    TB lung, latent 2011    Thyroid disease     Followed by PCP       Past Surgical History:  Past Surgical History:   Procedure Laterality Date    CHOLECYSTECTOMY LAPAROSCOPIC  2024    CYSTOSCOPY W/ URETERAL STENT PLACEMENT Right 2022    Removed 22    DILATION AND EVACUATION  08/10/2021    MAB    SPLENECTOMY  2010       Social Hx:  Social History     Socioeconomic History    Marital status: /Civil Union     Spouse name: Not on file    Number of children:  Not on file    Years of education: Not on file    Highest education level: Not on file   Occupational History    Not on file   Tobacco Use    Smoking status: Never     Passive exposure: Never    Smokeless tobacco: Never   Vaping Use    Vaping status: Never Used   Substance and Sexual Activity    Alcohol use: Not Currently    Drug use: Not Currently     Types: Marijuana     Comment: History of marijuana in college    Sexual activity: Yes     Partners: Male     Birth control/protection: None   Other Topics Concern    Not on file   Social History Narrative    Not on file     Social Determinants of Health     Financial Resource Strain: Not At Risk (6/29/2023)    Received from [facility]    Financial Resource Strain     In the last 12 months did you skip medications to save money?: No     In the last 12 months, was there a time when you needed to see a doctor but could not because of cost?: No   Food Insecurity: No Food Insecurity (10/26/2024)    Nursing - Inadequate Food Risk Classification     Worried About Running Out of Food in the Last Year: Never true     Ran Out of Food in the Last Year: Never true     Ran Out of Food in the Last Year: 1   Transportation Needs: No Transportation Needs (10/26/2024)    Nursing - Transportation Risk Classification     Lack of Transportation: Not on file     Lack of Transportation: 2   Physical Activity: Not on file   Stress: Not on file   Social Connections: Not At Risk (6/29/2023)    Received from [facility]    Social Connections     Do you often feel lonely?: No   Intimate Partner Violence: Patient Unable To Answer (10/26/2024)    Nursing IPS     Feels Physically and Emotionally Safe: Not on file     Physically Hurt by Someone: Not on file     Humiliated or Emotionally Abused by Someone: Not on file     Physically Hurt by Someone: 97     Hurt or Threatened by Someone: 97   Housing Stability: Unknown (10/26/2024)    Nursing:  "Inadequate Housing Risk Classification     Has Housing: Not on file     Worried About Losing Housing: Not on file     Unable to Get Utilities: Not on file     Unable to Pay for Housing in the Last Year: 2     Has Housin       Meds:  No current facility-administered medications on file prior to encounter.     Current Outpatient Medications on File Prior to Encounter   Medication Sig Dispense Refill    aspirin 81 mg chewable tablet Chew 162 mg daily      famotidine (PEPCID) 20 mg tablet Take 20 mg by mouth 2 (two) times a day      Ferrous Sulfate ER (Slow Fe) 45 MG TBCR Take by mouth      FLUoxetine (PROzac) 10 mg capsule Take 1 capsule (10 mg total) by mouth daily 30 capsule 6    levothyroxine 75 mcg tablet Take 75 mcg by mouth daily      Prenatal Vit-Fe Sulfate-FA-DHA (Prenatal Vitamin/Min +DHA) 27-0.8-200 MG CAPS Take by mouth      vitamin B-12 (VITAMIN B-12) 1,000 mcg tablet Take 1 tablet (1,000 mcg total) by mouth daily 30 tablet 1       Allergies:  Allergies   Allergen Reactions    Pollen Extract Other (See Comments)     Itchy eyes, runny nose       RoS/    Constitutional: Negative    CV: Negative    Pulm: Negative    GI: Negative    Urinary: Negative    Neuro: Negative    Musculoskeletal: Negative    Obstetric History:    G 3 P 1011  1  2940 g at 38 weeks with GTN, and PP preE with SF    Labs:  No results found for: \"STREPGRPB\"  Type & Screen:  ABO Grouping   Date Value Ref Range Status   10/25/2024 B  Final      Rh Factor   Date Value Ref Range Status   10/25/2024 Positive  Final    No results found for: \"ANTIBODYSCR\"    Specimen Expiration Date   Date Value Ref Range Status   10/25/2024 [id number]  Final     HIV-1/HIV-2 AB   Date Value Ref Range Status   2024 Non-Reactive  Final     Hepatitis B Surface Ag   Date Value Ref Range Status   10/21/2024 Non-reactive Non-Reactive Final     RPR   Date Value Ref Range Status   10/09/2024 Non Reactive Non Reactive Final     No results found for: " "\"RUBELLAIGGQT\"  Glucose   Date Value Ref Range Status   10/09/2024 93 70 - 139 mg/dL Final     Comment:     According to ADA, a glucose threshold of >139 mg/dL after 50-gram  load identifies approximately 80% of women with gestational  diabetes mellitus, while the sensitivity is further increased to  approximately 90% by a threshold of >129 mg/dL.               Physical Exam:      Vital signs: 124/74  83  16  afebrile     Alert, comfortable, no acute distress      Neuro:        Alert, appropriate affect, no gross deficits        Normal speech        CN2-12 intact and symmetric        DTR 3+ and symmetric        Muscle strength 5/5 and symmetric    Regular rate and rhythm    Clear to auscultation bilaterally    Abdomen soft, nontender, nondistended.    Fundus nontender, size consistent with dates      Cephalic  Cervix: 3 cm  60%  0 station  FHR: cat 1  CTXN: q 7 min  Membranes: intact  Pelvis adequate  EFW 5 lb      "

## 2024-11-10 NOTE — UTILIZATION REVIEW
Initial Clinical Review    Admission: Date/Time/Statement:   Admission Orders (From admission, onward)       Ordered        247  Place in Observation  Once                          Orders Placed This Encounter   Procedures    Place in Observation     Standing Status:   Standing     Number of Occurrences:   1     Order Specific Question:   Level of Care     Answer:   Med Surg [16]     L&D information       Expected   2024     Arrival   2024 21:34    Acuity   -              Means of arrival   Walk-In    Escorted by   Family Member    Service   OB/GYN    Admission type   Emergency              Arrival complaint   33 wks pregnant, contractions. OB doesn't know of arrival             Chief Complaint   Patient presents with    Abdominal Pain     33 weeks pregnant tomorrow; was in  labor 2 weeks ago, sent to [facility] and able to stop them, tonight had 6  contraction sin 30 min; called OB and waiting on return; [facility]       Initial Presentation: 34 y.o. female presented to L&D as observation for concerns of PTL. G 3 P 1 @ 32 weeks. Patient states contractions increased today approx 7/hr per pt.  Pt was sent to [facility] 2 weeks ago for progress from 1 cm to 1.5 cm and 50% effaced with + fFN.  Had full course BMZ then.  Plan IVF, IV Pen G PO Procardia continuous external monitoring and supportive care.  Cervix: 3 cm  60%  0 station  FHR: cat 1  CTXN: q 7 min  Membranes: intact      Date: 11-10-24   Day 2: remains on continuous external monitoring Pt had episode low BP at 445 AM, most likely secondary to procardia.  Given IV bolus fluid and resolved.   At approx the same time developed headache, which did not improve with improved BP.          Scheduled Medications:  levothyroxine, 75 mcg, Oral, Early Morning  NIFEdipine, 20 mg, Oral, Q6H  penicillin G, 2.5 Million Units, Intravenous, Q4H      Continuous IV Infusions:  lactated ringers, 125 mL/hr, Intravenous, Continuous      PRN  Meds:  acetaminophen, 650 mg, Oral, Q6H PRN  bupivacaine (PF), 30 mL, Infiltration, Once PRN  diphenhydrAMINE, 25 mg, Oral, HS PRN  ondansetron, 4 mg, Intravenous, Q6H PRN      Admitting  Vitals   Temperature Pulse Respirations Blood Pressure SpO2 Pain Score   11/09/24 2138 11/09/24 2138 11/09/24 2138 11/09/24 2138 11/09/24 2138 11/09/24 2156   98.3 °F (36.8 °C) 89 20 131/87 99 % 4     Weight (last 2 days)       Date/Time Weight    11/09/24 2156 74.4 (164)    11/09/24 2138 74.4 (164)            Vital Signs (last 3 days)       Date/Time Temp Pulse Resp BP MAP (mmHg) SpO2 O2 Device Patient Position - Orthostatic VS Pain    11/10/24 0718 97.9 °F (36.6 °C) 92 -- 112/67 -- 97 % -- -- 9    11/10/24 0658 -- 85 -- -- -- 97 % -- -- --    11/10/24 0655 -- 88 -- 119/66 -- -- -- -- --    11/10/24 0653 -- 90 -- -- -- 97 % -- -- --    11/10/24 0648 -- 86 -- -- -- 97 % -- -- --    11/10/24 0643 -- 93 -- -- -- 96 % -- -- --    11/10/24 0640 -- 100 -- 111/60 -- -- -- -- --    11/10/24 0638 -- 87 -- -- -- 95 % -- -- --    11/10/24 0610 -- 86 -- 103/58 -- -- -- -- --    11/10/24 0608 -- 86 -- -- -- 94 % -- -- --    11/10/24 0603 -- 88 -- -- -- 93 % -- -- --    11/10/24 0558 -- 88 -- -- -- 93 % -- -- --    11/10/24 0555 -- 88 -- 108/59 -- -- -- -- --    11/10/24 0552 -- 86 -- -- -- 92 % -- -- --    11/10/24 0548 -- 87 -- -- -- 93 % -- -- --    11/10/24 0543 -- 82 -- -- -- 93 % -- -- --    11/10/24 0541 -- 87 -- 102/57 -- -- -- -- --    11/10/24 0538 -- 84 -- -- -- 94 % -- -- --    11/10/24 0533 -- 84 -- -- -- 95 % -- -- --    11/10/24 0528 -- 86 -- -- -- 96 % -- -- --    11/10/24 0525 -- 99 -- 109/56 -- -- -- -- --    11/10/24 0523 -- 87 -- -- -- 95 % -- -- --    11/10/24 0518 -- 86 -- -- -- 96 % -- -- --    11/10/24 0513 -- 84 -- -- -- 97 % -- -- --    11/10/24 0510 -- 84 -- 111/60 -- -- -- -- --    11/10/24 0508 -- 87 -- -- -- 98 % -- -- --    11/10/24 0455 -- 88 -- 106/55 -- -- -- -- --    11/10/24 0444 -- 85 -- 114/61 -- -- -- --  --    11/10/24 0440 -- 98 -- 85/53 -- -- -- -- --    11/10/24 0425 -- 87 -- 94/53 -- -- -- -- --    11/10/24 0410 -- 90 -- 104/56 -- -- -- -- --    11/10/24 0331 -- -- -- 110/61 -- -- -- -- 9    11/10/24 0325 -- 89 -- 110/61 -- -- -- -- --    11/10/24 0310 -- 93 -- 104/59 -- -- -- -- --    11/10/24 0255 -- 88 -- 106/61 -- -- -- -- --    11/10/24 0240 -- 91 -- 109/59 -- -- -- -- --    11/10/24 0225 -- 90 -- 109/58 -- -- -- -- --    11/10/24 0210 -- 98 -- 117/58 -- -- -- -- --    11/10/24 0155 -- 92 -- 104/62 -- -- -- -- --    11/10/24 0140 -- 82 -- 107/64 -- -- -- -- --    11/10/24 0126 -- 81 -- 105/63 -- -- -- -- --    11/10/24 0110 -- 81 -- 111/64 -- -- -- -- --    11/10/24 0055 -- 85 -- 114/68 -- -- -- -- --    11/10/24 0042 -- 86 -- 115/70 -- -- -- -- --    11/10/24 0025 -- 88 -- 109/64 -- -- -- -- --    11/10/24 0010 -- 81 -- 103/57 -- -- -- -- --    11/10/24 0002 -- -- -- 103/66 -- -- -- -- --    11/10/24 0000 -- -- -- 103/66 -- -- -- -- --    11/09/24 2341 -- 83 -- 109/67 -- -- -- -- --    11/09/24 2326 -- 81 -- 110/65 -- -- -- -- --    11/09/24 2200 -- -- -- 123/74 -- -- -- -- --    11/09/24 2156 98.3 °F (36.8 °C) 83 18 124/74 -- -- -- -- 4    11/09/24 2138 98.3 °F (36.8 °C) 89 20 131/87 110 99 % None (Room air) Sitting --              Pertinent Labs/Diagnostic Test Results:         Results from last 7 days   Lab Units 11/09/24  2239   WBC Thousand/uL 13.45*   HEMOGLOBIN g/dL 10.5*   HEMATOCRIT % 32.5*   PLATELETS Thousands/uL 420*     Results from last 7 days   Lab Units 11/08/24  1552   GLUCOSE UA  neg   PROTEIN UA  neg         Past Medical History:   Diagnosis Date    Anxiety     Cholelithiasis with acute cholecystitis 01/2024    History of cold sores     History of immune thrombocytopenia 2010    Hydronephrosis of right kidney 07/2022    Migraines     without Aura    TB lung, latent 2011    Thyroid disease     Followed by PCP     Present on Admission:  **None**      Admitting Diagnosis: Abdominal pain  affecting pregnancy [O26.899, R10.9]  Age/Sex: 34 y.o. female    Network Utilization Review Department  ATTENTION: Please call with any questions or concerns to [phone] and carefully listen to the prompts so that you are directed to the right person. All voicemails are confidential.   For Discharge needs, contact Care Management DC Support Team at [phone] opt. 2  Send all requests for admission clinical reviews, approved or denied determinations and any other requests to dedicated fax number below belonging to the [facility] where the patient is receiving treatment. List of dedicated fax numbers for the Facilities:  FACILITY NAME UR FAX NUMBER   ADMISSION DENIALS (Administrative/Medical Necessity) [phone]   DISCHARGE SUPPORT TEAM (NETWORK) [phone]   PARENT CHILD HEALTH (Maternity/NICU/Pediatrics) [phone]   [facility] [phone]   [facility] [phone]   [facility] [phone]   [facility] [phone]   [facility] [phone]   [facility] [phone]   [facility] [phone]   [facility] [phone]   [facility] [phone]   [facility] [phone]   [facility] [phone]   [facility] [place] [phone]

## 2024-11-10 NOTE — PLAN OF CARE
Problem: PAIN - ADULT  Goal: Verbalizes/displays adequate comfort level or baseline comfort level  Description: Interventions:  - Encourage patient to monitor pain and request assistance  - Assess pain using appropriate pain scale  - Administer analgesics based on type and severity of pain and evaluate response  - Implement non-pharmacological measures as appropriate and evaluate response  - Consider cultural and social influences on pain and pain management  - Notify physician/advanced practitioner if interventions unsuccessful or patient reports new pain  Outcome: Progressing     Problem: INFECTION - ADULT  Goal: Absence or prevention of progression during hospitalization  Description: INTERVENTIONS:  - Assess and monitor for signs and symptoms of infection  - Monitor lab/diagnostic results  - Monitor all insertion sites, i.e. indwelling lines, tubes, and drains  - Monitor endotracheal if appropriate and nasal secretions for changes in amount and color  - [place] appropriate cooling/warming therapies per order  - Administer medications as ordered  - Instruct and encourage patient and family to use good hand hygiene technique  - Identify and instruct in appropriate isolation precautions for identified infection/condition  Outcome: Progressing  Goal: Absence of fever/infection during neutropenic period  Description: INTERVENTIONS:  - Monitor WBC    Outcome: Progressing     Problem: SAFETY ADULT  Goal: Patient will remain free of falls  Description: INTERVENTIONS:  - Educate patient/family on patient safety including physical limitations  - Instruct patient to call for assistance with activity   - Consult OT/PT to assist with strengthening/mobility   - Keep Call bell within reach  - Keep bed low and locked with side rails adjusted as appropriate  - Keep care items and personal belongings within reach  - Initiate and maintain comfort rounds  - Make Fall Risk Sign visible to staff  - Offer Toileting every  Hours,  in advance of need  - Initiate/Maintain alarm  - Obtain necessary fall risk management equipment:   - Apply yellow socks and bracelet for high fall risk patients  - Consider moving patient to room near nurses station  Outcome: Progressing  Goal: Maintain or return to baseline ADL function  Description: INTERVENTIONS:  -  Assess patient's ability to carry out ADLs; assess patient's baseline for ADL function and identify physical deficits which impact ability to perform ADLs (bathing, care of mouth/teeth, toileting, grooming, dressing, etc.)  - Assess/evaluate cause of self-care deficits   - Assess range of motion  - Assess patient's mobility; develop plan if impaired  - Assess patient's need for assistive devices and provide as appropriate  - Encourage maximum independence but intervene and supervise when necessary  - Involve family in performance of ADLs  - Assess for home care needs following discharge   - Consider OT consult to assist with ADL evaluation and planning for discharge  - Provide patient education as appropriate  Outcome: Progressing  Goal: Maintains/Returns to pre admission functional level  Description: INTERVENTIONS:  - Perform AM-PAC 6 Click Basic Mobility/ Daily Activity assessment daily.  - Set and communicate daily mobility goal to care team and patient/family/caregiver.   - Collaborate with rehabilitation services on mobility goals if consulted  - Perform Range of Motion  times a day.  - Reposition patient every  hours.  - Dangle patient  times a day  - Stand patient  times a day  - Ambulate patient  times a day  - Out of bed to chair  times a day   - Out of bed for meals  times a day  - Out of bed for toileting  - Record patient progress and toleration of activity level   Outcome: Progressing     Problem: DISCHARGE PLANNING  Goal: Discharge to home or other facility with appropriate resources  Description: INTERVENTIONS:  - Identify barriers to discharge w/patient and caregiver  - Arrange for  needed discharge resources and transportation as appropriate  - Identify discharge learning needs (meds, wound care, etc.)  - Arrange for interpretive services to assist at discharge as needed  - Refer to Case Management Department for coordinating discharge planning if the patient needs post-hospital services based on physician/advanced practitioner order or complex needs related to functional status, cognitive ability, or social support system  Outcome: Progressing     Problem: Knowledge Deficit  Goal: Patient/family/caregiver demonstrates understanding of disease process, treatment plan, medications, and discharge instructions  Description: Complete learning assessment and assess knowledge base.  Interventions:  - Provide teaching at level of understanding  - Provide teaching via preferred learning methods  Outcome: Progressing     Problem: ANTEPARTUM  Goal: Maintain pregnancy as long as maternal and/or fetal condition is stable  Description: INTERVENTIONS:  - Maternal surveillance  - Fetal surveillance  - Monitor uterine activity  - Medications as ordered  - Bedrest  Outcome: Progressing

## 2024-11-10 NOTE — TELEPHONE ENCOUNTER
Regardin Weeks & 6 days pregnant had 6 contractions within a half an hour  ----- Message from Kyleigh HOFFMAN sent at 2024  9:34 PM EST -----  '' I'm 32 weeks and 6 days pregnant .I had 6 contractions within a half an hour.''

## 2024-11-10 NOTE — CONSULTS
NICU Prenatal Consult   China Scott 34 y.o. female MRN: [id number]  Unit/Bed#: -01 Encounter: [id number]    Consulting Physician: Tiffanie Maxwell MD      A NICU Prenatal Consult has been requested by OB due to threatened  birth in the setting of  labor.     China Scott is a 34 y.o. , with an MARIALUISA of 2024 at 32w6d GA by US and LMP.    China is expecting baby girl, to be named Yesenia. Estimated fetal weight is 5 lb (as of 2024). She intends to breastfeed feed.     China  was by herself, she was awake and comfortable. Her  was at home with their 2 year old daughter. Cuate offered to talk to FOB again if they have questions.     Prenatal Care:Yes, description good.  Pt was admitted to [facility] 2 weeks ago for progress from 1 cm to 1.5 cm and 50% effaced with + fFN. Had full course BMZ then.     Mother presented with contractions. Intact membranes.     Prenatal Labs:  Lab Results   Component Value Date/Time    ABO Grouping B 2024 10:39 PM    Rh Factor Positive 2024 10:39 PM    Hepatitis B Surface Ag Non-reactive 10/21/2024 01:45 PM    Hepatitis C Ab Non-reactive 10/21/2024 01:45 PM    RPR Non Reactive 10/09/2024 01:45 PM    HIV-1/HIV-2 AB Non-Reactive 2024 12:00 AM      24 22:39   Antibody Screen Negative      10/09/24 00:00 10/25/24 23:46   Syphilis Total Antibody negative (E) Non-reactive   (E): External lab result   24 00:00   CHLAMYDIA,AMPLIFIED DNA PROBE negative (E)   N GONORRHOEAE, AMPLIFIED DNA negative (E)   (E): External lab result    Unknown labs at this time: GBS    Pregnancy complications:   Patient Active Problem List   Diagnosis    Hypothyroidism complicating pregnancy, third trimester    32 weeks gestation of pregnancy    Depression affecting pregnancy in third trimester, antepartum    History of severe pre-eclampsia    Transfer @ 18 weeks    Iron deficiency anemia secondary to inadequate dietary iron intake    Numbness  and tingling     contractions    GBS bacteriuria     labor in third trimester     Maternal medical history:   Past Medical History:   Diagnosis Date    Anxiety     Cholelithiasis with acute cholecystitis 2024    History of cold sores     History of immune thrombocytopenia 2010    Hydronephrosis of right kidney 2022    Migraines     without Aura    TB lung, latent 2011    Thyroid disease     Followed by PCP     Medications at home:   Medications Prior to Admission:     aspirin 81 mg chewable tablet    famotidine (PEPCID) 20 mg tablet    Ferrous Sulfate ER (Slow Fe) 45 MG TBCR    FLUoxetine (PROzac) 10 mg capsule    levothyroxine 75 mcg tablet    Prenatal Vit-Fe Sulfate-FA-DHA (Prenatal Vitamin/Min +DHA) 27-0.8-200 MG CAPS    vitamin B-12 (VITAMIN B-12) 1,000 mcg tablet  Maternal social history:  Social History     Tobacco Use    Smoking status: Never     Passive exposure: Never    Smokeless tobacco: Never   Substance Use Topics    Alcohol use: Not Currently     Maternal  medications:  steroids: complete BMZ at 30 weeks. On nifedipine.       I spoke with China Scott today regarding the upcoming birth of her baby girl.  We discussed the baby's possible medical problems and the specialized care needed to address these problems.  This discussion included, but was not limited to:     Attendance of physician/MELIA, nursing, and/or respiratory therapist in the delivery and delivery room management , Risk of feedings problems and potential need for IV fluids or gavage tube feeds, Risk of hypoglycemia requiring formula feeding if breastmilk is unavailable or IV dextrose infusion, Risk of hypothermia and need for radiant warmer and/or isolette, Risk of immature cardiorespiratory control and need for monitoring and/or caffeine , Risk of jaundice requiring phototherapy, Risk of NEC and advantages of breast milk , Risk of respiratory distress and possible need for support (oxygen, CPAP,  intubation, and/or surfactant), and Risk of sepsis and possible need for lab tests and IV antibiotics, as well as survival and risk for neurodevelopmental impairment, and that infant requires long term developmental follow-up EI.       All of China's questions were answered to the best of my ability, and she was encouraged to contact us with any further questions.      Thank you for including us in the care of China Scott. Please reach out to the on-call Neonatologist via [email or web address] for any further questions that may arise.    I spent 40 minutes in consultation with China Sonia, of which 50% was in direct communication.    Darien Sarmiento MD  - Medicine

## 2024-11-10 NOTE — PROGRESS NOTES
Progress Note - OB/GYN   Name: China Scott 34 y.o. female I MRN: [id number]  Unit/Bed#: -01 I Date of Admission: 2024   Date of Service: 11/10/2024 I Hospital Day: 0     Assessment & Plan   labor in third trimester  No cervical change  OK for discharge  Reviewed PTL precautions      OB L&D Progress Note  Subjective   Ctx have spaced out. Still feeling them. HA improved.     Objective :  Temp:  [97.9 °F (36.6 °C)-98.3 °F (36.8 °C)] 97.9 °F (36.6 °C)  HR:  [] 92  BP: ()/(53-87) 112/67  Resp:  [18-20] 18  SpO2:  [92 %-99 %] 97 %  O2 Device: None (Room air)    Physical Exam  Vitals and nursing note reviewed.   Constitutional:       General: She is not in acute distress.     Appearance: She is well-developed.   HENT:      Head: Normocephalic and atraumatic.   Eyes:      Conjunctiva/sclera: Conjunctivae normal.   Pulmonary:      Effort: Pulmonary effort is normal. No respiratory distress.   Abdominal:      Palpations: Abdomen is soft.      Tenderness: There is no abdominal tenderness.   Musculoskeletal:         General: No swelling.      Cervical back: Neck supple.   Skin:     General: Skin is warm and dry.      Capillary Refill: Capillary refill takes less than 2 seconds.   Neurological:      Mental Status: She is alert.   Psychiatric:         Mood and Affect: Mood normal.         Lab Results: I have reviewed the following results:CBC  .     24  2239   WBC 13.45*   HGB 10.5*   HCT 32.5*   *

## 2024-11-10 NOTE — TELEPHONE ENCOUNTER
Pt made me aware that she is currently in triage at [facility]  Reason for Disposition  • Patient already left for the hospital/clinic.    Protocols used: No Contact or Duplicate Contact Call-Adult-

## 2024-11-11 ENCOUNTER — ROUTINE PRENATAL (OUTPATIENT)
Dept: OBGYN CLINIC | Facility: CLINIC | Age: 34
End: 2024-11-11
Payer: COMMERCIAL

## 2024-11-11 VITALS — BODY MASS INDEX: 32.03 KG/M2 | SYSTOLIC BLOOD PRESSURE: 112 MMHG | DIASTOLIC BLOOD PRESSURE: 68 MMHG | WEIGHT: 164 LBS

## 2024-11-11 DIAGNOSIS — Z3A.18 18 WEEKS GESTATION OF PREGNANCY: ICD-10-CM

## 2024-11-11 DIAGNOSIS — E03.9 HYPOTHYROIDISM COMPLICATING PREGNANCY, THIRD TRIMESTER: ICD-10-CM

## 2024-11-11 DIAGNOSIS — R82.71 GBS BACTERIURIA: ICD-10-CM

## 2024-11-11 DIAGNOSIS — F32.A DEPRESSION AFFECTING PREGNANCY IN THIRD TRIMESTER, ANTEPARTUM: ICD-10-CM

## 2024-11-11 DIAGNOSIS — Z87.59 HISTORY OF SEVERE PRE-ECLAMPSIA: ICD-10-CM

## 2024-11-11 DIAGNOSIS — D50.8 IRON DEFICIENCY ANEMIA SECONDARY TO INADEQUATE DIETARY IRON INTAKE: ICD-10-CM

## 2024-11-11 DIAGNOSIS — Z3A.33 33 WEEKS GESTATION OF PREGNANCY: Primary | ICD-10-CM

## 2024-11-11 DIAGNOSIS — O99.283 HYPOTHYROIDISM COMPLICATING PREGNANCY, THIRD TRIMESTER: ICD-10-CM

## 2024-11-11 DIAGNOSIS — O99.343 DEPRESSION AFFECTING PREGNANCY IN THIRD TRIMESTER, ANTEPARTUM: ICD-10-CM

## 2024-11-11 LAB
GP B STREP DNA SPEC QL NAA+PROBE: POSITIVE
SL AMB  POCT GLUCOSE, UA: NEGATIVE
SL AMB POCT URINE PROTEIN: NEGATIVE

## 2024-11-11 PROCEDURE — 81002 URINALYSIS NONAUTO W/O SCOPE: CPT | Performed by: OBSTETRICS & GYNECOLOGY

## 2024-11-11 PROCEDURE — PNV: Performed by: OBSTETRICS & GYNECOLOGY

## 2024-11-11 NOTE — PROGRESS NOTES
Routine Prenatal Visit  [facility] - [address], [address], [place] [address]    Assessment/Plan:  China is a 34 y.o. year old  at 33w0d who presents for routine prenatal visit.     1. 33 weeks gestation of pregnancy  -     POCT urine dip  -     was evaluated on L&D 11/10/24 for PTL, discharged to home with precautions after no cervical change noted.  2. Hypothyroidism complicating pregnancy, third trimester  3. Depression affecting pregnancy in third trimester, antepartum  4. History of severe pre-eclampsia  5. Transfer @ 18 weeks  6. Iron deficiency anemia secondary to inadequate dietary iron intake  7. GBS bacteriuria    Next OB Visit 2 weeks.    Subjective:     CC: Prenatal care    China Scott is a 34 y.o.  female who presents for routine prenatal care at 33w0d.  Pregnancy ROS: no leakage of fluid, pelvic pain, or vaginal bleeding.  normal fetal movement.    The following portions of the patient's history were reviewed and updated as appropriate: allergies, current medications, past family history, past medical history, obstetric history, gynecologic history, past social history, past surgical history and problem list.      Objective:  /68   Wt 74.4 kg (164 lb)   LMP 2024 (Exact Date)   BMI 32.03 kg/m²   Pregravid Weight/BMI: 55.3 kg (122 lb) (BMI 23.83)  Current Weight: 74.4 kg (164 lb)   Total Weight Gain: 19.1 kg (42 lb)   Pre-Sherin Vitals      Flowsheet Row Most Recent Value   Prenatal Assessment    Fetal Heart Rate 150   Fundal Height (cm) 33 cm   Movement Present   Presentation Vertex   Prenatal Vitals    Blood Pressure 112/68   Weight - Scale 74.4 kg (164 lb)   Urine Albumin/Glucose    Dilation/Effacement/Station    Vaginal Drainage    Draining Fluid No   Edema              General: Well appearing, no distress  Abdomen: Soft, gravid, nontender  Extremities: Non tender.

## 2024-11-19 ENCOUNTER — HOSPITAL ENCOUNTER (OUTPATIENT)
Facility: HOSPITAL | Age: 34
Discharge: HOME | End: 2024-11-19
Attending: OBSTETRICS & GYNECOLOGY | Admitting: OBSTETRICS & GYNECOLOGY
Payer: COMMERCIAL

## 2024-11-19 ENCOUNTER — NURSE TRIAGE (OUTPATIENT)
Age: 34
End: 2024-11-19

## 2024-11-19 VITALS
TEMPERATURE: 97.2 F | SYSTOLIC BLOOD PRESSURE: 130 MMHG | HEART RATE: 74 BPM | OXYGEN SATURATION: 100 % | RESPIRATION RATE: 18 BRPM | DIASTOLIC BLOOD PRESSURE: 71 MMHG

## 2024-11-19 PROBLEM — O47.03 PRETERM UTERINE CONTRACTIONS, ANTEPARTUM, THIRD TRIMESTER: Status: ACTIVE | Noted: 2024-10-25

## 2024-11-19 LAB
BACTERIA UR QL AUTO: ABNORMAL /HPF
BILIRUB UR QL STRIP: NEGATIVE
CLARITY UR: ABNORMAL
COLOR UR: ABNORMAL
GLUCOSE UR STRIP-MCNC: NEGATIVE MG/DL
HGB UR QL STRIP.AUTO: NEGATIVE
KETONES UR STRIP-MCNC: NEGATIVE MG/DL
LEUKOCYTE ESTERASE UR QL STRIP: NEGATIVE
MUCOUS THREADS UR QL AUTO: ABNORMAL
NITRITE UR QL STRIP: NEGATIVE
NON-SQ EPI CELLS URNS QL MICRO: ABNORMAL /HPF
PH UR STRIP.AUTO: 5.5 [PH]
PROT UR STRIP-MCNC: NEGATIVE MG/DL
RBC #/AREA URNS AUTO: ABNORMAL /HPF
SP GR UR STRIP.AUTO: 1.01 (ref 1–1.03)
UROBILINOGEN UR STRIP-ACNC: <2 MG/DL
WBC #/AREA URNS AUTO: ABNORMAL /HPF

## 2024-11-19 PROCEDURE — 99213 OFFICE O/P EST LOW 20 MIN: CPT

## 2024-11-19 PROCEDURE — 59025 FETAL NON-STRESS TEST: CPT | Performed by: OBSTETRICS & GYNECOLOGY

## 2024-11-19 PROCEDURE — 76815 OB US LIMITED FETUS(S): CPT | Performed by: OBSTETRICS & GYNECOLOGY

## 2024-11-19 PROCEDURE — 87077 CULTURE AEROBIC IDENTIFY: CPT | Performed by: OBSTETRICS & GYNECOLOGY

## 2024-11-19 PROCEDURE — 87086 URINE CULTURE/COLONY COUNT: CPT | Performed by: OBSTETRICS & GYNECOLOGY

## 2024-11-19 PROCEDURE — 81001 URINALYSIS AUTO W/SCOPE: CPT | Performed by: OBSTETRICS & GYNECOLOGY

## 2024-11-19 RX ADMIN — SODIUM CHLORIDE, SODIUM LACTATE, POTASSIUM CHLORIDE, AND CALCIUM CHLORIDE 1000 ML: .6; .31; .03; .02 INJECTION, SOLUTION INTRAVENOUS at 15:57

## 2024-11-19 NOTE — H&P
"Triage Note - OB  China Scott 34 y.o. female MRN: [id number]  Unit/Bed#: LD TRIAGE 1- Encounter: [id number]        ASSESSMENT/PLAN  China Scott is a 34 y.o.  at 34w1d presenting with  contractions    Assessment & Plan   uterine contractions, antepartum, third trimester  - FFN positive on 10/25/24  - s/p betamethasone 10/25 and 10/26  - most recent exam 3/60/0 on 24  - SVE today unchanged at 3/60/-2  - Bedside US: active fetus in cephalic presentation with incidental BPP ; ALL 12.1  - will give 1 liter IVF and re-evaluate  GBS bacteriuria  Will need prophylaxis in labor  History of severe pre-eclampsia  - asymptomatic at this time with normal blood pressure  Hypothyroidism complicating pregnancy, third trimester  - continue levothyroxine 75 mcg daily  Depression affecting pregnancy in third trimester, antepartum  - continue fluoxetine 10 mg daily  Iron deficiency anemia secondary to inadequate dietary iron intake       Discharge instructions  - Patient instructed to call if experiencing worsening contractions, vaginal bleeding, loss of fluid or decreased fetal movement.  - Will follow up with OBGYN in office      She is a patient of [facility] [place]  D/w Dr. Taylor, on call OBGYN Attending Physician  ______________    SUBJECTIVE    MARIALUISA: Estimated Date of Delivery: 24    HPI:  34 y.o.  34w1d presents with report of contractions that started today. Denies leaking of fluid or vaginal bleeding. Reports good fetal movement.    Pregnancy has been complicated by previous observation for  contractions, for which she had a positive FFN and for which she received betamethasone for fetal lung maturity on 10/25 and 10/26.     She has a history of severe PEC in prior pregnancy so she is taking daily low dose aspirin. Denies HA, visual changes other than occasional \"floaters\" , epigastric pain, SOB or chest pain.      Her obstetrical history is " significant for    Patient Active Problem List   Diagnosis    Hypothyroidism complicating pregnancy, third trimester    32 weeks gestation of pregnancy    Depression affecting pregnancy in third trimester, antepartum    History of severe pre-eclampsia    Transfer @ 18 weeks    Iron deficiency anemia secondary to inadequate dietary iron intake    Numbness and tingling     uterine contractions, antepartum, third trimester    GBS bacteriuria     labor in third trimester         Past Medical History:   Diagnosis Date    Anxiety     Cholelithiasis with acute cholecystitis 2024    History of cold sores     History of immune thrombocytopenia     Hydronephrosis of right kidney 2022    Migraines     without Aura    TB lung, latent 2011    Thyroid disease     Followed by PCP       Past Surgical History:   Procedure Laterality Date    CHOLECYSTECTOMY LAPAROSCOPIC  2024    CYSTOSCOPY W/ URETERAL STENT PLACEMENT Right 2022    Removed 22    DILATION AND EVACUATION  08/10/2021    [facility]AB    SPLENECTOMY  2010           ROS:  Constitutional: Negative  Respiratory: Negative  Cardiovascular: Negative    Gastrointestinal: Negative     Physical Exam  General: Well appearing, no distress  Respiratory: normal respiratory effort  Abdomen: Soft, gravid, nontender    Extremities: Warm and well perfused.  Non tender.      OBJECTIVE:  /71   Pulse 74   Temp (!) 97.2 °F (36.2 °C) (Temporal)   Resp 18   LMP 2024 (Exact Date)   SpO2 100%   There is no height or weight on file to calculate BMI.  Labs: No results found for this or any previous visit (from the past 24 hours).      SVE: 360/-2       FHT: reactive NST (see note)       TOCO: q3-4       IMAGING: Bedside US: active fetus in cephalic presentation with incidental BPP 8/8; ALL 12.1      Deborah Flynn MD  OB/GYN   2024  3:26 PM      Portions of the record may have been created with voice recognition software.  Occasional  "wrong word or \"sound a like\" substitutions may have occurred due to the inherent limitations of voice recognition software.  Read the chart carefully and recognize, using context, where substitutions have occurred    "

## 2024-11-19 NOTE — PROGRESS NOTES
Repeat SVE unchanged at 3/60/-2  Contractions spaced slightly following 1 liter bolus  Precautions discussed, patient advised to return with worsening contractions, leaking of fluid, vaginal bleeding or decreased fetal movement.

## 2024-11-19 NOTE — PROCEDURES
China Scott, a  at 34w1d with an MARIALUISA of 2024, Alternate MARIALUISA Entry, was seen at [facility] for the following procedure(s): $Procedure Type: NST, ALL]    Nonstress Test  Variability: Moderate  Decelerations: None  Accelerations: Yes  Acoustic Stimulator: No  Baseline: 155 BPM  Uterine Irritability: No  Contractions: Irregular    4 Quadrant ALL  ALL Q1 (cm): 2.9 cm  ALL Q2 (cm): 3.4 cm  ALL Q3 (cm): 4.9 cm  ALL Q4 (cm): 0.9 cm  ALL TOTAL (cm): 12.1 cm              Interpretation  Nonstress Test Interpretation: Reactive  Overall Impression: Reassuring  Comments: cephalic

## 2024-11-19 NOTE — ASSESSMENT & PLAN NOTE
- FFN positive on 10/25/24  - s/p betamethasone 10/25 and 10/26  - most recent exam 3/60/0 on 11/9/24

## 2024-11-19 NOTE — TELEPHONE ENCOUNTER
"34 w1 day EDC 24  Patient having increased contractions, occurring every few minutes, patient reports pain is uncomfortable 4/10. Not leaking, not bleeding,has been having  increase in mucous discharge for last few days. Baby is moving well. Hx of severe pre-eclampsia. Reports some visual floaters, no increase in swelling of hands or face, no other concerning symptoms.  Patient states she was 3 cm dilated last week and she is concerned this pain is different and much more intense.   Patient is on her way to [facility] L&D ETA approx. 35 minutes.   [facility] DR Vadim Davidson ( reviewed message)  and L&d [facility] Charge   Reason for Disposition   Contractions < 10 minutes apart for 1 hour (i.e., 6 or more contractions an hour)    Answer Assessment - Initial Assessment Questions  1. ONSET: \"When did the symptoms begin?\"         All day  2. CONTRACTIONS: \"Describe the contractions that you are having.\" (e.g., duration, frequency, regularity, severity)      Frequency and regularity   3. PREGNANCY: \"How many weeks pregnant are you?\"      34 weeks 1 day  4. MARIALUISA: \"What date are you expecting to deliver?\"      24  5. PARITY: \"Have you had a baby before?\" If Yes, ask: \"How long did the labor last?\"         6. FETAL MOVEMENT: \"Has the baby's movement decreased or changed significantly from normal?\"      Baby moving well  7. OTHER SYMPTOMS: \"Do you have any other symptoms?\" (e.g., leaking fluid from vagina, fever, hand/facial swelling)      denies    Protocols used: Pregnancy - Labor - -Adult-[place]    "

## 2024-11-19 NOTE — ASSESSMENT & PLAN NOTE
- FFN positive on 10/25/24  - s/p betamethasone 10/25 and 10/26  - most recent exam 3/60/0 on 11/9/24  - SVE today unchanged at 3/60/-2  - Bedside US: active fetus in cephalic presentation with incidental BPP 8/8; ALL 12.1  - will give 1 liter IVF and re-evaluate

## 2024-11-20 LAB — BACTERIA UR CULT: ABNORMAL

## 2024-11-22 ENCOUNTER — ROUTINE PRENATAL (OUTPATIENT)
Dept: OBGYN CLINIC | Facility: CLINIC | Age: 34
End: 2024-11-22
Payer: COMMERCIAL

## 2024-11-22 ENCOUNTER — RESULTS FOLLOW-UP (OUTPATIENT)
Dept: LABOR AND DELIVERY | Facility: HOSPITAL | Age: 34
End: 2024-11-22

## 2024-11-22 VITALS
BODY MASS INDEX: 33.38 KG/M2 | DIASTOLIC BLOOD PRESSURE: 72 MMHG | WEIGHT: 170 LBS | SYSTOLIC BLOOD PRESSURE: 116 MMHG | HEIGHT: 60 IN

## 2024-11-22 DIAGNOSIS — Z3A.18 18 WEEKS GESTATION OF PREGNANCY: ICD-10-CM

## 2024-11-22 DIAGNOSIS — E03.9 HYPOTHYROIDISM COMPLICATING PREGNANCY, THIRD TRIMESTER: Primary | ICD-10-CM

## 2024-11-22 DIAGNOSIS — O99.283 HYPOTHYROIDISM COMPLICATING PREGNANCY, THIRD TRIMESTER: Primary | ICD-10-CM

## 2024-11-22 DIAGNOSIS — R82.71 GBS BACTERIURIA: ICD-10-CM

## 2024-11-22 DIAGNOSIS — Z3A.34 34 WEEKS GESTATION OF PREGNANCY: ICD-10-CM

## 2024-11-22 DIAGNOSIS — F32.A DEPRESSION AFFECTING PREGNANCY IN THIRD TRIMESTER, ANTEPARTUM: ICD-10-CM

## 2024-11-22 DIAGNOSIS — Z23 NEED FOR TDAP VACCINATION: ICD-10-CM

## 2024-11-22 DIAGNOSIS — D50.8 IRON DEFICIENCY ANEMIA SECONDARY TO INADEQUATE DIETARY IRON INTAKE: ICD-10-CM

## 2024-11-22 DIAGNOSIS — Z87.59 HISTORY OF SEVERE PRE-ECLAMPSIA: ICD-10-CM

## 2024-11-22 DIAGNOSIS — O99.343 DEPRESSION AFFECTING PREGNANCY IN THIRD TRIMESTER, ANTEPARTUM: ICD-10-CM

## 2024-11-22 LAB
SL AMB  POCT GLUCOSE, UA: NORMAL
SL AMB POCT URINE PROTEIN: NORMAL

## 2024-11-22 PROCEDURE — PNV: Performed by: OBSTETRICS & GYNECOLOGY

## 2024-11-22 PROCEDURE — 81002 URINALYSIS NONAUTO W/O SCOPE: CPT | Performed by: OBSTETRICS & GYNECOLOGY

## 2024-11-22 PROCEDURE — 90471 IMMUNIZATION ADMIN: CPT | Performed by: OBSTETRICS & GYNECOLOGY

## 2024-11-22 PROCEDURE — 90715 TDAP VACCINE 7 YRS/> IM: CPT | Performed by: OBSTETRICS & GYNECOLOGY

## 2024-11-22 NOTE — ASSESSMENT & PLAN NOTE
Doing ok on meds, however stressed with  contractions and being evaluated frequently. Offered SW or therapy, pt declined at this time.

## 2024-11-22 NOTE — PROGRESS NOTES
Routine Prenatal Visit  [facility] [address], [address], [place] [address]    Assessment/Plan:  China is a 34 y.o. year old  at 34w4d who presents for routine prenatal visit.     1. Hypothyroidism complicating pregnancy, third trimester  2. Depression affecting pregnancy in third trimester, antepartum  Assessment & Plan:  Doing ok on meds, however stressed with  contractions and being evaluated frequently. Offered SW or therapy, pt declined at this time.   3. History of severe pre-eclampsia  4. Transfer @ 18 weeks  5. Iron deficiency anemia secondary to inadequate dietary iron intake  6. GBS bacteriuria  7. 34 weeks gestation of pregnancy  Assessment & Plan:  Declined flu and RSV, getting TDAP today  Orders:  -     POCT urine dip  8. Need for Tdap vaccination  -     TDAP VACCINE GREATER THAN OR EQUAL TO 8YO IM        Subjective:   China Scott is a 34 y.o.  who presents for routine prenatal care at 34w4d.  Complaints today: Denies  LOF: -; VB: -; Contractions: irregular; FM: +    Objective:  /72 (BP Location: Left arm, Patient Position: Sitting, Cuff Size: Standard)   Ht 5' (1.524 m)   Wt 77.1 kg (170 lb)   LMP 2024 (Exact Date)   BMI 33.20 kg/m²     General: Well appearing, no distress  Respiratory: Unlabored breathing  Abdomen: Soft, gravid, nontender  Extremities: Warm and well perfused.  Non tender.    Pregravid Weight/BMI: 55.3 kg (122 lb) (BMI 23.83)  Current Weight: 77.1 kg (170 lb)   Total Weight Gain: 21.8 kg (48 lb)     Pre-Sherin Vitals      Flowsheet Row Most Recent Value   Prenatal Assessment    Fetal Heart Rate 156   Fundal Height (cm) 34 cm   Movement Present   Prenatal Vitals    Blood Pressure 116/72   Weight - Scale 77.1 kg (170 lb)   Urine Albumin/Glucose    Dilation/Effacement/Station    Vaginal Drainage    Edema              Zeyad Wong DO  2024 8:45 AM

## 2024-11-28 ENCOUNTER — HOSPITAL ENCOUNTER (OUTPATIENT)
Facility: HOSPITAL | Age: 34
Discharge: HOME | End: 2024-11-28
Attending: OBSTETRICS & GYNECOLOGY | Admitting: OBSTETRICS & GYNECOLOGY
Payer: COMMERCIAL

## 2024-11-28 ENCOUNTER — NURSE TRIAGE (OUTPATIENT)
Dept: OTHER | Facility: OTHER | Age: 34
End: 2024-11-28

## 2024-11-28 VITALS — HEART RATE: 73 BPM | DIASTOLIC BLOOD PRESSURE: 77 MMHG | SYSTOLIC BLOOD PRESSURE: 125 MMHG

## 2024-11-28 PROBLEM — O26.899 ABDOMINAL PAIN AFFECTING PREGNANCY: Status: ACTIVE | Noted: 2024-11-28

## 2024-11-28 PROBLEM — Z3A.36 36 WEEKS GESTATION OF PREGNANCY: Status: ACTIVE | Noted: 2024-11-28

## 2024-11-28 PROBLEM — R10.9 ABDOMINAL PAIN AFFECTING PREGNANCY: Status: ACTIVE | Noted: 2024-11-28

## 2024-11-28 LAB
ALBUMIN SERPL BCG-MCNC: 3.1 G/DL (ref 3.5–5)
ALP SERPL-CCNC: 137 U/L (ref 34–104)
ALT SERPL W P-5'-P-CCNC: 9 U/L (ref 7–52)
ANION GAP SERPL CALCULATED.3IONS-SCNC: 8 MMOL/L (ref 4–13)
AST SERPL W P-5'-P-CCNC: 25 U/L (ref 13–39)
BACTERIA UR QL AUTO: ABNORMAL /HPF
BASOPHILS # BLD AUTO: 0.09 THOUSANDS/ÂΜL (ref 0–0.1)
BASOPHILS NFR BLD AUTO: 1 % (ref 0–1)
BILIRUB SERPL-MCNC: 0.34 MG/DL (ref 0.2–1)
BILIRUB UR QL STRIP: NEGATIVE
BUN SERPL-MCNC: 7 MG/DL (ref 5–25)
CALCIUM ALBUM COR SERPL-MCNC: 8.7 MG/DL (ref 8.3–10.1)
CALCIUM SERPL-MCNC: 8 MG/DL (ref 8.4–10.2)
CHLORIDE SERPL-SCNC: 105 MMOL/L (ref 96–108)
CLARITY UR: ABNORMAL
CO2 SERPL-SCNC: 20 MMOL/L (ref 21–32)
COLOR UR: COLORLESS
CREAT SERPL-MCNC: 0.55 MG/DL (ref 0.6–1.3)
CREAT UR-MCNC: 20.8 MG/DL
EOSINOPHIL # BLD AUTO: 0.25 THOUSAND/ÂΜL (ref 0–0.61)
EOSINOPHIL NFR BLD AUTO: 2 % (ref 0–6)
ERYTHROCYTE [DISTWIDTH] IN BLOOD BY AUTOMATED COUNT: 14 % (ref 11.6–15.1)
GFR SERPL CREATININE-BSD FRML MDRD: 122 ML/MIN/1.73SQ M
GLUCOSE SERPL-MCNC: 78 MG/DL (ref 65–140)
GLUCOSE UR STRIP-MCNC: NEGATIVE MG/DL
HCT VFR BLD AUTO: 33.1 % (ref 34.8–46.1)
HGB BLD-MCNC: 10.1 G/DL (ref 11.5–15.4)
HGB UR QL STRIP.AUTO: NEGATIVE
IMM GRANULOCYTES # BLD AUTO: 0.14 THOUSAND/UL (ref 0–0.2)
IMM GRANULOCYTES NFR BLD AUTO: 1 % (ref 0–2)
KETONES UR STRIP-MCNC: NEGATIVE MG/DL
LEUKOCYTE ESTERASE UR QL STRIP: NEGATIVE
LYMPHOCYTES # BLD AUTO: 3.56 THOUSANDS/ÂΜL (ref 0.6–4.47)
LYMPHOCYTES NFR BLD AUTO: 24 % (ref 14–44)
MCH RBC QN AUTO: 28.7 PG (ref 26.8–34.3)
MCHC RBC AUTO-ENTMCNC: 30.5 G/DL (ref 31.4–37.4)
MCV RBC AUTO: 94 FL (ref 82–98)
MONOCYTES # BLD AUTO: 1.29 THOUSAND/ÂΜL (ref 0.17–1.22)
MONOCYTES NFR BLD AUTO: 9 % (ref 4–12)
NEUTROPHILS # BLD AUTO: 9.85 THOUSANDS/ÂΜL (ref 1.85–7.62)
NEUTS SEG NFR BLD AUTO: 63 % (ref 43–75)
NITRITE UR QL STRIP: NEGATIVE
NON-SQ EPI CELLS URNS QL MICRO: ABNORMAL /HPF
NRBC BLD AUTO-RTO: 0 /100 WBCS
PH UR STRIP.AUTO: 6.5 [PH]
PLATELET # BLD AUTO: 345 THOUSANDS/UL (ref 149–390)
PMV BLD AUTO: 11.2 FL (ref 8.9–12.7)
POTASSIUM SERPL-SCNC: 3.8 MMOL/L (ref 3.5–5.3)
PROT SERPL-MCNC: 6.4 G/DL (ref 6.4–8.4)
PROT UR STRIP-MCNC: NEGATIVE MG/DL
PROT UR-MCNC: <4 MG/DL
RBC # BLD AUTO: 3.52 MILLION/UL (ref 3.81–5.12)
RBC #/AREA URNS AUTO: ABNORMAL /HPF
SODIUM SERPL-SCNC: 133 MMOL/L (ref 135–147)
SP GR UR STRIP.AUTO: <1.005 (ref 1–1.03)
URATE SERPL-MCNC: 3.1 MG/DL (ref 2–7.5)
UROBILINOGEN UR STRIP-ACNC: <2 MG/DL
WBC # BLD AUTO: 15.18 THOUSAND/UL (ref 4.31–10.16)
WBC #/AREA URNS AUTO: ABNORMAL /HPF

## 2024-11-28 PROCEDURE — 87086 URINE CULTURE/COLONY COUNT: CPT | Performed by: OBSTETRICS & GYNECOLOGY

## 2024-11-28 PROCEDURE — 82570 ASSAY OF URINE CREATININE: CPT | Performed by: OBSTETRICS & GYNECOLOGY

## 2024-11-28 PROCEDURE — 81001 URINALYSIS AUTO W/SCOPE: CPT | Performed by: OBSTETRICS & GYNECOLOGY

## 2024-11-28 PROCEDURE — 85025 COMPLETE CBC W/AUTO DIFF WBC: CPT | Performed by: OBSTETRICS & GYNECOLOGY

## 2024-11-28 PROCEDURE — 59025 FETAL NON-STRESS TEST: CPT | Performed by: OBSTETRICS & GYNECOLOGY

## 2024-11-28 PROCEDURE — 84156 ASSAY OF PROTEIN URINE: CPT | Performed by: OBSTETRICS & GYNECOLOGY

## 2024-11-28 PROCEDURE — 87147 CULTURE TYPE IMMUNOLOGIC: CPT | Performed by: OBSTETRICS & GYNECOLOGY

## 2024-11-28 PROCEDURE — 80053 COMPREHEN METABOLIC PANEL: CPT | Performed by: OBSTETRICS & GYNECOLOGY

## 2024-11-28 PROCEDURE — 99213 OFFICE O/P EST LOW 20 MIN: CPT

## 2024-11-28 PROCEDURE — 76815 OB US LIMITED FETUS(S): CPT | Performed by: OBSTETRICS & GYNECOLOGY

## 2024-11-28 PROCEDURE — 84550 ASSAY OF BLOOD/URIC ACID: CPT | Performed by: OBSTETRICS & GYNECOLOGY

## 2024-11-28 NOTE — H&P
Triage Note - OB  China Scott 34 y.o. female MRN: [id number]  Unit/Bed#: LD TRIAGE 1- Encounter: [id number]        ASSESSMENT/PLAN  China Scott is a 34 y.o.  at 35w3d presenting with upper abdominal pain.    Assessment & Plan  Abdominal pain affecting pregnancy  - No significant upper abdominal pain on exam, however, uterine fundus is occupied by significant portion of fetus which could be contributing to pain  - h/o cholecystectomy  - h/o hydronephrosis, this pain does not feel like that, and there is no CVAT on exam  - history of preeclampsia in prior pregnancy; abdominal exam is fairly unremarkable and does not support need for further imaging  - will check CBC, CMP, uric acid, urine Pr:Cr  - check UA and urine culture  - BP is normal  - rare contractions on monitor, will defer exam  History of severe pre-eclampsia  - no signs PEC  GBS bacteriuria  - will need prophylaxis in labor  Hypothyroidism complicating pregnancy, third trimester  - on levothyroxine 75 mcg daily  Depression affecting pregnancy in third trimester, antepartum  - on Prozac 10 mg daily  Iron deficiency anemia secondary to inadequate dietary iron intake      Discharge instructions  - Patient instructed to call if experiencing worsening contractions, vaginal bleeding, loss of fluid or decreased fetal movement.  - Will follow up with OBGYN in office      She is a patient of [facility] [place]  D/w Dr. Maxwell, on call physician  ______________    SUBJECTIVE    MARIALUISA: Estimated Date of Delivery: 24    HPI:  34 y.o.  35w3d presents with complaint of upper abdominal pain. Pain started yesterday, intermittent. Not related to food intake (has history of cholecystectomy following first child). It wraps around to the back on the  right side. She also has a history of hydronephrosis and this does not feel the same. Denies dysuria. Reports vision changes (seeing stars intermittently).      Denies contractions  but reports pressure for past few weeks. Denies leaking of fluid or vaginal bleeding. Reports good fetal movement.    Her obstetrical history is significant for     Patient Active Problem List   Diagnosis    Hypothyroidism complicating pregnancy, third trimester    34 weeks gestation of pregnancy    Depression affecting pregnancy in third trimester, antepartum    History of severe pre-eclampsia    Transfer @ 18 weeks    Iron deficiency anemia secondary to inadequate dietary iron intake    Numbness and tingling     uterine contractions, antepartum, third trimester    GBS bacteriuria     labor in third trimester    36 weeks gestation of pregnancy    Abdominal pain affecting pregnancy         Past Medical History:   Diagnosis Date    Anxiety     Cholelithiasis with acute cholecystitis 2024    History of cold sores     History of immune thrombocytopenia 2010    Hydronephrosis of right kidney 2022    Migraines     without Aura    TB lung, latent 2011    Thyroid disease     Followed by PCP       Past Surgical History:   Procedure Laterality Date    CHOLECYSTECTOMY      CHOLECYSTECTOMY LAPAROSCOPIC  2024    CYSTOSCOPY W/ URETERAL STENT PLACEMENT Right 2022    Removed 22    DILATION AND EVACUATION  08/10/2021    MAB    SPLENECTOMY  2010           ROS:  Constitutional: Negative  Respiratory: Negative  Cardiovascular: Negative    Gastrointestinal: Negative     Physical Exam  General: Well appearing, no distress  Respiratory: CTAB   Cardiovascular: Regular rate  Abdomen: Soft, gravid, nontender; no rebound or guarding; no CVAT  Extremities: Warm and well perfused.  Non tender.      OBJECTIVE:  /77   Pulse 73   LMP 2024 (Exact Date)   There is no height or weight on file to calculate BMI.  Labs: No results found for this or any previous visit (from the past 24 hours).      SVE: deferred       FHT: 130 + moderate variability + accels no decels       TOCO: rare  "contractions       IMAGING:       Deborah Flynn MD  OB/GYN   11/28/2024  11:06 AM      Portions of the record may have been created with voice recognition software.  Occasional wrong word or \"sound a like\" substitutions may have occurred due to the inherent limitations of voice recognition software.  Read the chart carefully and recognize, using context, where substitutions have occurred    "

## 2024-11-28 NOTE — TELEPHONE ENCOUNTER
"Reason for Disposition   [1] Baby moving less today (e.g., kick count < 5 in 1 hour or < 10 in 2 hours) AND [2] pregnant 23 or more weeks    Answer Assessment - Initial Assessment Questions  1. LOCATION: \"Where does it hurt?\"       Upper abdomen, right side  2. RADIATION: \"Does the pain shoot anywhere else?\" (e.g., chest, back)      Around to the back  3. ONSET: \"When did the pain begin?\" (Minutes, hours or days ago)       Last night, worse this morning  4. SUDDEN: \"Gradual or sudden onset?\"      gradual  5. PATTERN: \"Does the pain come and go, or has it been constant since it started?\"       Comes and goes  6. SEVERITY: \"How bad is the pain?\" \"What does it keep you from doing?\"  (e.g., Scale 1-10; mild, moderate, or severe)      510  7. RECURRENT SYMPTOM: \"Have you ever had this type of stomach pain before?\" If Yes, ask: \"When was the last time?\" and \"What happened that time?\"       deenies  8. CAUSE: \"What do you think is causing the stomach pain?      unsure  9. RELIEVING/AGGRAVATING FACTORS: \"What makes it better or worse?\" (e.g., antacids, bowel movement, movement)      denies  10. FETAL MOVEMENT: \"Has the baby's movement decreased or changed significantly from normal?\"        Feels like its less than usual, no exact kick count, but feels it hasn't been 10 times in last two hours  11. OTHER SYMPTOMS: \"Do you have any other symptoms?\" (e.g., back pain, contractions, diarrhea, fever, headache, urination pain, vaginal bleeding, vaginal discharge, vomiting)        Loose stools last few days, some nausea, denies leaking fluid, vaginal bleeding   12. MARIALUISA: \"What date are you expecting to deliver?\"        24    Protocols used: Pregnancy - Abdominal Pain Greater Than 20 Weeks [facility] to on call provider- MARIALUISA ,  called in with complaint of R upper abdominal pain that comes and goes 5/10, generally feeling unwell, and baby with less than 10 movements in the last 2 hours. Has been seen a few " times for  labor (last was  and she was 3cm) and is concerned. Please advise.     Per on call provider, pt may go to L&D triage for evaluation. Discussed with pt who is agreeable.

## 2024-11-28 NOTE — PROCEDURES
China Scott, a  at 35w3d with an MARIALUISA of 2024, Alternate MARIALUISA Entry, was seen at [facility]OR AND DELIVERY for the following procedure(s): $Procedure Type: NST, ALL]    Nonstress Test  Reason for NST:  (abdominal pain pregnancy)  Variability: Moderate  Decelerations: None  Accelerations: Yes  Acoustic Stimulator: No  Baseline: 130 BPM  Uterine Irritability: No  Contractions:  (rare)    4 Quadrant ALL  ALL Q1 (cm): 3.8 cm  ALL Q2 (cm): 2.8 cm  ALL Q3 (cm): 2 cm  ALL Q4 (cm): 0.7 cm  ALL TOTAL (cm): 9.3 cm              Interpretation  Nonstress Test Interpretation: Reactive  Overall Impression: Reassuring  Comments: vertex

## 2024-11-28 NOTE — PROGRESS NOTES
Labs wnl. Bp normal. NST reactive and reassuring. Bedside US shows active fetus in cephalic position with full bladder; ALL initially 6.9, repeat 9.2 cm. Recommend return in 2 days for repeat ALL and BP check.

## 2024-11-28 NOTE — ASSESSMENT & PLAN NOTE
- No significant upper abdominal pain on exam, however, uterine fundus is occupied by significant portion of fetus which could be contributing to pain  - h/o cholecystectomy  - h/o hydronephrosis, this pain does not feel like that, and there is no CVAT on exam  - history of preeclampsia in prior pregnancy; abdominal exam is fairly unremarkable and does not support need for further imaging  - will check CBC, CMP, uric acid, urine Pr:Cr  - check UA and urine culture  - BP is normal  - rare contractions on monitor, will defer exam

## 2024-11-30 ENCOUNTER — HOSPITAL ENCOUNTER (OUTPATIENT)
Facility: HOSPITAL | Age: 34
Discharge: HOME | End: 2024-11-30
Attending: OBSTETRICS & GYNECOLOGY | Admitting: OBSTETRICS & GYNECOLOGY
Payer: COMMERCIAL

## 2024-11-30 VITALS
OXYGEN SATURATION: 97 % | DIASTOLIC BLOOD PRESSURE: 76 MMHG | RESPIRATION RATE: 18 BRPM | HEART RATE: 81 BPM | TEMPERATURE: 98.2 F | SYSTOLIC BLOOD PRESSURE: 118 MMHG

## 2024-11-30 LAB — BACTERIA UR CULT: ABNORMAL

## 2024-11-30 PROCEDURE — 59025 FETAL NON-STRESS TEST: CPT | Performed by: OBSTETRICS & GYNECOLOGY

## 2024-11-30 PROCEDURE — 99211 OFF/OP EST MAY X REQ PHY/QHP: CPT

## 2024-11-30 PROCEDURE — 76815 OB US LIMITED FETUS(S): CPT | Performed by: OBSTETRICS & GYNECOLOGY

## 2024-11-30 NOTE — PROCEDURES
China Scott, a  at 35w5d with an MARIALUISA of 2024, Alternate MARIALUISA Entry, was seen at [facility] AND DELIVERY for the following procedure(s): $Procedure Type: NST, ALL]    Nonstress Test  Reason for NST:  (history preeclampsia)  Variability: Moderate  Decelerations: None  Accelerations: Yes  Acoustic Stimulator: No  Baseline: 130 BPM  Contractions:  (rare)    4 Quadrant ALL  ALL Q1 (cm): 5.5 cm  ALL Q2 (cm): 2.9 cm  ALL Q3 (cm): 1.6 cm  ALL Q4 (cm): 1.8 cm  ALL TOTAL (cm): 11.8 cm              Interpretation  Nonstress Test Interpretation: Reactive  Overall Impression: Reassuring  Comments: vertex

## 2024-11-30 NOTE — ASSESSMENT & PLAN NOTE
- here for repeat NST, ALL and BP check due to symptoms reported several days ago  - BP here is 118/76  - Nst reactive  - ALL 11.8, active fetus in cephalic position  - next office visit on Wednesday

## 2024-11-30 NOTE — H&P
Triage Note - OB  China Scott 34 y.o. female MRN: [id number]  Unit/Bed#: LD TRIAGE 1- Encounter: [id number]        ASSESSMENT/PLAN  China Scott is a 34 y.o.  at 35w5d presents for repeat NST, ALL and BP check.    Assessment & Plan  History of severe pre-eclampsia  - here for repeat NST, ALL and BP check due to symptoms reported several days ago  - BP here is 118/76  - Nst reactive  - ALL 11.8, active fetus in cephalic position  - next office visit on Wednesday  Hypothyroidism complicating pregnancy, third trimester  - levothyroxine 75 mcg daily  Iron deficiency anemia secondary to inadequate dietary iron intake  - continue iron supplement  GBS bacteriuria  - for antibiotic ppx during delivery  Depression affecting pregnancy in third trimester, antepartum  - prozac 10 mg daily           Discharge instructions  - Patient instructed to call if experiencing worsening contractions, vaginal bleeding, loss of fluid or decreased fetal movement.  - Will follow up with OBGYN in office      She is a patient of [facility]  D/w Dr. Maxwell, on call OBGYN Attending Physician  ______________    SUBJECTIVE    MARIALUISA: Estimated Date of Delivery: 24    HPI:  34 y.o.  35w5d presents for repeat NST and ALL. Feeling overall better than when here last visit. Denies painful contractions, leaking of fluid, vaginal bleeding. Reports good fetal movement. Uncomfortable in general.      Her obstetrical history is significant for   Patient Active Problem List   Diagnosis    Hypothyroidism complicating pregnancy, third trimester    34 weeks gestation of pregnancy    Depression affecting pregnancy in third trimester, antepartum    History of severe pre-eclampsia    Transfer @ 18 weeks    Iron deficiency anemia secondary to inadequate dietary iron intake    Numbness and tingling     uterine contractions, antepartum, third trimester    GBS bacteriuria     labor in third trimester     "36 weeks gestation of pregnancy    Abdominal pain affecting pregnancy         Past Medical History:   Diagnosis Date    Anxiety     Cholelithiasis with acute cholecystitis 01/2024    History of cold sores     History of immune thrombocytopenia 2010    Hydronephrosis of right kidney 07/2022    Migraines     without Aura    TB lung, latent 2011    Thyroid disease     Followed by PCP       Past Surgical History:   Procedure Laterality Date    CHOLECYSTECTOMY      CHOLECYSTECTOMY LAPAROSCOPIC  01/12/2024    CYSTOSCOPY W/ URETERAL STENT PLACEMENT Right 07/08/2022    Removed 7/28/22    DILATION AND EVACUATION  08/10/2021    MAB    SPLENECTOMY  02/07/2010           ROS:  Constitutional: Negative  Respiratory: Negative  Cardiovascular: Negative    Gastrointestinal: Negative     Physical Exam  General: Well appearing, no distress  Respiratory: CTAB   Cardiovascular: Regular rate  Abdomen: Soft, gravid, nontender    Extremities: Warm and well perfused.  Non tender.      OBJECTIVE:  LMP 03/25/2024 (Exact Date)   There is no height or weight on file to calculate BMI.  Labs: No results found for this or any previous visit (from the past 24 hours).      SVE: deferred       FHT: reactive       TOCO: 1-2 in 10  Contraction Intensity: Mild    IMAGING: ALL 11.8, vertex      Deborah Flynn MD  OB/GYN   11/30/2024  10:10 AM      Portions of the record may have been created with voice recognition software.  Occasional wrong word or \"sound a like\" substitutions may have occurred due to the inherent limitations of voice recognition software.  Read the chart carefully and recognize, using context, where substitutions have occurred    "

## 2024-12-01 ENCOUNTER — ANESTHESIA (INPATIENT)
Dept: ANESTHESIOLOGY | Facility: HOSPITAL | Age: 34
End: 2024-12-01
Payer: COMMERCIAL

## 2024-12-01 ENCOUNTER — HOSPITAL ENCOUNTER (INPATIENT)
Facility: HOSPITAL | Age: 34
LOS: 2 days | Discharge: HOME | End: 2024-12-03
Attending: OBSTETRICS & GYNECOLOGY | Admitting: OBSTETRICS & GYNECOLOGY
Payer: COMMERCIAL

## 2024-12-01 ENCOUNTER — ANESTHESIA EVENT (INPATIENT)
Dept: ANESTHESIOLOGY | Facility: HOSPITAL | Age: 34
End: 2024-12-01
Payer: COMMERCIAL

## 2024-12-01 ENCOUNTER — NURSE TRIAGE (OUTPATIENT)
Dept: OTHER | Facility: OTHER | Age: 34
End: 2024-12-01

## 2024-12-01 DIAGNOSIS — O42.919 PRETERM PREMATURE RUPTURE OF MEMBRANES: Primary | ICD-10-CM

## 2024-12-01 LAB
A1 MICROGLOB PLACENTAL VAG QL: POSITIVE
ABO GROUP BLD: NORMAL
BASE EXCESS BLDCOA CALC-SCNC: -2 MMOL/L (ref 3–11)
BASE EXCESS BLDCOV CALC-SCNC: 1.2 MMOL/L (ref 1–9)
BASOPHILS # BLD AUTO: 0.1 THOUSANDS/ÂΜL (ref 0–0.1)
BASOPHILS NFR BLD AUTO: 1 % (ref 0–1)
BLD GP AB SCN SERPL QL: NEGATIVE
EOSINOPHIL # BLD AUTO: 0.15 THOUSAND/ÂΜL (ref 0–0.61)
EOSINOPHIL NFR BLD AUTO: 1 % (ref 0–6)
ERYTHROCYTE [DISTWIDTH] IN BLOOD BY AUTOMATED COUNT: 13.6 % (ref 11.6–15.1)
HCO3 BLDCOA-SCNC: 24 MMOL/L (ref 17.3–27.3)
HCO3 BLDCOV-SCNC: 25 MMOL/L (ref 12.2–28.6)
HCT VFR BLD AUTO: 36.1 % (ref 34.8–46.1)
HGB BLD-MCNC: 11.4 G/DL (ref 11.5–15.4)
IMM GRANULOCYTES # BLD AUTO: 0.18 THOUSAND/UL (ref 0–0.2)
IMM GRANULOCYTES NFR BLD AUTO: 1 % (ref 0–2)
LYMPHOCYTES # BLD AUTO: 3.02 THOUSANDS/ÂΜL (ref 0.6–4.47)
LYMPHOCYTES NFR BLD AUTO: 16 % (ref 14–44)
MCH RBC QN AUTO: 28.9 PG (ref 26.8–34.3)
MCHC RBC AUTO-ENTMCNC: 31.6 G/DL (ref 31.4–37.4)
MCV RBC AUTO: 92 FL (ref 82–98)
MONOCYTES # BLD AUTO: 1.76 THOUSAND/ÂΜL (ref 0.17–1.22)
MONOCYTES NFR BLD AUTO: 9 % (ref 4–12)
NEUTROPHILS # BLD AUTO: 13.86 THOUSANDS/ÂΜL (ref 1.85–7.62)
NEUTS SEG NFR BLD AUTO: 72 % (ref 43–75)
NRBC BLD AUTO-RTO: 0 /100 WBCS
O2 CT VFR BLDCOA CALC: 10.5 ML/DL
OXYHGB MFR BLDCOA: 45 %
OXYHGB MFR BLDCOV: 67.6 %
PCO2 BLDCOA: 44.9 MM[HG] (ref 30–60)
PCO2 BLDCOV: 37.3 MM HG (ref 27–43)
PH BLDCOA: 7.34 [PH] (ref 7.23–7.43)
PH BLDCOV: 7.44 [PH] (ref 7.19–7.49)
PLATELET # BLD AUTO: 464 THOUSANDS/UL (ref 149–390)
PMV BLD AUTO: 10.6 FL (ref 8.9–12.7)
PO2 BLDCOA: 19.8 MM HG (ref 5–25)
PO2 BLDCOV: 26.1 MM HG (ref 15–45)
RBC # BLD AUTO: 3.94 MILLION/UL (ref 3.81–5.12)
RH BLD: POSITIVE
SAO2 % BLDCOV: 15.1 ML/DL
SPECIMEN EXPIRATION DATE: NORMAL
WBC # BLD AUTO: 19.07 THOUSAND/UL (ref 4.31–10.16)

## 2024-12-01 PROCEDURE — 3E033VJ INTRODUCTION OF OTHER HORMONE INTO PERIPHERAL VEIN, PERCUTANEOUS APPROACH: ICD-10-PCS | Performed by: OBSTETRICS & GYNECOLOGY

## 2024-12-01 PROCEDURE — 86901 BLOOD TYPING SEROLOGIC RH(D): CPT | Performed by: OBSTETRICS & GYNECOLOGY

## 2024-12-01 PROCEDURE — 84112 EVAL AMNIOTIC FLUID PROTEIN: CPT | Performed by: OBSTETRICS & GYNECOLOGY

## 2024-12-01 PROCEDURE — 93005 ELECTROCARDIOGRAM TRACING: CPT

## 2024-12-01 PROCEDURE — NC001 PR NO CHARGE: Performed by: OBSTETRICS & GYNECOLOGY

## 2024-12-01 PROCEDURE — 82805 BLOOD GASES W/O2 SATURATION: CPT | Performed by: OBSTETRICS & GYNECOLOGY

## 2024-12-01 PROCEDURE — 88307 TISSUE EXAM BY PATHOLOGIST: CPT | Performed by: PATHOLOGY

## 2024-12-01 PROCEDURE — 86900 BLOOD TYPING SEROLOGIC ABO: CPT | Performed by: OBSTETRICS & GYNECOLOGY

## 2024-12-01 PROCEDURE — 85025 COMPLETE CBC W/AUTO DIFF WBC: CPT | Performed by: OBSTETRICS & GYNECOLOGY

## 2024-12-01 PROCEDURE — 99213 OFFICE O/P EST LOW 20 MIN: CPT

## 2024-12-01 PROCEDURE — 86850 RBC ANTIBODY SCREEN: CPT | Performed by: OBSTETRICS & GYNECOLOGY

## 2024-12-01 PROCEDURE — 4A1HXCZ MONITORING OF PRODUCTS OF CONCEPTION, CARDIAC RATE, EXTERNAL APPROACH: ICD-10-PCS | Performed by: OBSTETRICS & GYNECOLOGY

## 2024-12-01 PROCEDURE — 59400 OBSTETRICAL CARE: CPT | Performed by: OBSTETRICS & GYNECOLOGY

## 2024-12-01 RX ORDER — OXYTOCIN/RINGER'S LACTATE 30/500 ML
1-30 PLASTIC BAG, INJECTION (ML) INTRAVENOUS
Status: DISCONTINUED | OUTPATIENT
Start: 2024-12-01 | End: 2024-12-01

## 2024-12-01 RX ORDER — LIDOCAINE HYDROCHLORIDE AND EPINEPHRINE 15; 5 MG/ML; UG/ML
INJECTION, SOLUTION EPIDURAL AS NEEDED
Status: DISCONTINUED | OUTPATIENT
Start: 2024-12-01 | End: 2024-12-01 | Stop reason: HOSPADM

## 2024-12-01 RX ORDER — DOCUSATE SODIUM 100 MG/1
100 CAPSULE, LIQUID FILLED ORAL 2 TIMES DAILY
Status: DISCONTINUED | OUTPATIENT
Start: 2024-12-01 | End: 2024-12-03 | Stop reason: HOSPADM

## 2024-12-01 RX ORDER — ROPIVACAINE HYDROCHLORIDE 2 MG/ML
INJECTION, SOLUTION EPIDURAL; INFILTRATION; PERINEURAL AS NEEDED
Status: DISCONTINUED | OUTPATIENT
Start: 2024-12-01 | End: 2024-12-01 | Stop reason: HOSPADM

## 2024-12-01 RX ORDER — IBUPROFEN 600 MG/1
600 TABLET, FILM COATED ORAL EVERY 6 HOURS PRN
Status: DISCONTINUED | OUTPATIENT
Start: 2024-12-01 | End: 2024-12-03 | Stop reason: HOSPADM

## 2024-12-01 RX ORDER — BENZOCAINE/MENTHOL 6 MG-10 MG
1 LOZENGE MUCOUS MEMBRANE DAILY PRN
Status: DISCONTINUED | OUTPATIENT
Start: 2024-12-01 | End: 2024-12-03 | Stop reason: HOSPADM

## 2024-12-01 RX ORDER — ONDANSETRON 2 MG/ML
4 INJECTION INTRAMUSCULAR; INTRAVENOUS EVERY 8 HOURS PRN
Status: DISCONTINUED | OUTPATIENT
Start: 2024-12-01 | End: 2024-12-03 | Stop reason: HOSPADM

## 2024-12-01 RX ORDER — ONDANSETRON 2 MG/ML
4 INJECTION INTRAMUSCULAR; INTRAVENOUS EVERY 6 HOURS PRN
Status: DISCONTINUED | OUTPATIENT
Start: 2024-12-01 | End: 2024-12-01

## 2024-12-01 RX ORDER — DIPHENHYDRAMINE HYDROCHLORIDE 50 MG/ML
25 INJECTION INTRAMUSCULAR; INTRAVENOUS EVERY 6 HOURS PRN
Status: DISCONTINUED | OUTPATIENT
Start: 2024-12-01 | End: 2024-12-03 | Stop reason: HOSPADM

## 2024-12-01 RX ORDER — FLUOXETINE 10 MG/1
10 CAPSULE ORAL DAILY
Status: DISCONTINUED | OUTPATIENT
Start: 2024-12-02 | End: 2024-12-03 | Stop reason: HOSPADM

## 2024-12-01 RX ORDER — OXYTOCIN/RINGER'S LACTATE 30/500 ML
250 PLASTIC BAG, INJECTION (ML) INTRAVENOUS ONCE
Status: DISCONTINUED | OUTPATIENT
Start: 2024-12-01 | End: 2024-12-03 | Stop reason: HOSPADM

## 2024-12-01 RX ORDER — LIDOCAINE HYDROCHLORIDE 10 MG/ML
INJECTION, SOLUTION EPIDURAL; INFILTRATION; INTRACAUDAL; PERINEURAL AS NEEDED
Status: DISCONTINUED | OUTPATIENT
Start: 2024-12-01 | End: 2024-12-01 | Stop reason: HOSPADM

## 2024-12-01 RX ORDER — BUPIVACAINE HYDROCHLORIDE 2.5 MG/ML
30 INJECTION, SOLUTION EPIDURAL; INFILTRATION; INTRACAUDAL ONCE AS NEEDED
Status: DISCONTINUED | OUTPATIENT
Start: 2024-12-01 | End: 2024-12-01

## 2024-12-01 RX ORDER — SODIUM CHLORIDE, SODIUM LACTATE, POTASSIUM CHLORIDE, CALCIUM CHLORIDE 600; 310; 30; 20 MG/100ML; MG/100ML; MG/100ML; MG/100ML
125 INJECTION, SOLUTION INTRAVENOUS CONTINUOUS
Status: DISCONTINUED | OUTPATIENT
Start: 2024-12-01 | End: 2024-12-01

## 2024-12-01 RX ORDER — CALCIUM CARBONATE 500 MG/1
1000 TABLET, CHEWABLE ORAL DAILY PRN
Status: DISCONTINUED | OUTPATIENT
Start: 2024-12-01 | End: 2024-12-03 | Stop reason: HOSPADM

## 2024-12-01 RX ORDER — FERROUS SULFATE 300 MG/5ML
300 LIQUID (ML) ORAL
Status: DISCONTINUED | OUTPATIENT
Start: 2024-12-02 | End: 2024-12-01

## 2024-12-01 RX ORDER — LEVOTHYROXINE SODIUM 75 UG/1
75 TABLET ORAL
Status: DISCONTINUED | OUTPATIENT
Start: 2024-12-02 | End: 2024-12-03 | Stop reason: HOSPADM

## 2024-12-01 RX ORDER — FERROUS SULFATE 325(65) MG
325 TABLET ORAL
Status: DISCONTINUED | OUTPATIENT
Start: 2024-12-02 | End: 2024-12-03 | Stop reason: HOSPADM

## 2024-12-01 RX ORDER — ACETAMINOPHEN 325 MG/1
650 TABLET ORAL EVERY 4 HOURS PRN
Status: DISCONTINUED | OUTPATIENT
Start: 2024-12-01 | End: 2024-12-03 | Stop reason: HOSPADM

## 2024-12-01 RX ORDER — NALBUPHINE HYDROCHLORIDE 10 MG/ML
10 INJECTION INTRAMUSCULAR; INTRAVENOUS; SUBCUTANEOUS EVERY 4 HOURS PRN
Status: DISCONTINUED | OUTPATIENT
Start: 2024-12-01 | End: 2024-12-01

## 2024-12-01 RX ADMIN — BENZOCAINE AND LEVOMENTHOL 1 APPLICATION: 200; 5 SPRAY TOPICAL at 21:05

## 2024-12-01 RX ADMIN — ONDANSETRON 4 MG: 2 INJECTION, SOLUTION INTRAMUSCULAR; INTRAVENOUS at 17:41

## 2024-12-01 RX ADMIN — ROPIVACAINE HYDROCHLORIDE 5 ML: 2 INJECTION, SOLUTION EPIDURAL; INFILTRATION at 13:40

## 2024-12-01 RX ADMIN — ROPIVACAINE HYDROCHLORIDE: 2 INJECTION, SOLUTION EPIDURAL; INFILTRATION at 14:00

## 2024-12-01 RX ADMIN — ROPIVACAINE HYDROCHLORIDE 10 ML: 2 INJECTION, SOLUTION EPIDURAL; INFILTRATION at 18:28

## 2024-12-01 RX ADMIN — SODIUM CHLORIDE, SODIUM LACTATE, POTASSIUM CHLORIDE, AND CALCIUM CHLORIDE 125 ML/HR: .6; .31; .03; .02 INJECTION, SOLUTION INTRAVENOUS at 16:15

## 2024-12-01 RX ADMIN — ROPIVACAINE HYDROCHLORIDE 3 ML: 2 INJECTION, SOLUTION EPIDURAL; INFILTRATION at 13:44

## 2024-12-01 RX ADMIN — SODIUM CHLORIDE, SODIUM LACTATE, POTASSIUM CHLORIDE, AND CALCIUM CHLORIDE 125 ML/HR: .6; .31; .03; .02 INJECTION, SOLUTION INTRAVENOUS at 13:51

## 2024-12-01 RX ADMIN — OXYTOCIN 2 MILLI-UNITS/MIN: 10 INJECTION INTRAVENOUS at 12:30

## 2024-12-01 RX ADMIN — LIDOCAINE HYDROCHLORIDE 3 ML: 10 INJECTION, SOLUTION EPIDURAL; INFILTRATION; INTRACAUDAL; PERINEURAL at 13:35

## 2024-12-01 RX ADMIN — WITCH HAZEL 1 PAD: 500 SOLUTION RECTAL; TOPICAL at 21:05

## 2024-12-01 RX ADMIN — SODIUM CHLORIDE, SODIUM LACTATE, POTASSIUM CHLORIDE, AND CALCIUM CHLORIDE 125 ML/HR: .6; .31; .03; .02 INJECTION, SOLUTION INTRAVENOUS at 12:11

## 2024-12-01 RX ADMIN — SODIUM CHLORIDE 2.5 MILLION UNITS: 9 INJECTION, SOLUTION INTRAVENOUS at 16:17

## 2024-12-01 RX ADMIN — SODIUM CHLORIDE 5 MILLION UNITS: 0.9 INJECTION, SOLUTION INTRAVENOUS at 12:11

## 2024-12-01 RX ADMIN — LIDOCAINE HYDROCHLORIDE 1 ML: 10 INJECTION, SOLUTION EPIDURAL; INFILTRATION; INTRACAUDAL; PERINEURAL at 13:36

## 2024-12-01 RX ADMIN — ROPIVACAINE HYDROCHLORIDE: 2 INJECTION, SOLUTION EPIDURAL; INFILTRATION at 19:20

## 2024-12-01 RX ADMIN — LIDOCAINE HYDROCHLORIDE,EPINEPHRINE BITARTRATE 3 ML: 15; .005 INJECTION, SOLUTION EPIDURAL; INFILTRATION; INTRACAUDAL; PERINEURAL at 13:38

## 2024-12-01 RX ADMIN — ACETAMINOPHEN 650 MG: 325 TABLET, FILM COATED ORAL at 18:30

## 2024-12-01 NOTE — OB LABOR/OXYTOCIN SAFETY PROGRESS
Labor Progress Note - China Scott 34 y.o. female MRN: [id number]    Unit/Bed#: -01 Encounter: [id number]    Dose (fredis-units/min) Oxytocin: 2 fredis-units/min  Contraction Frequency (minutes): irregular  Contraction Intensity: Mild  Uterine Activity Characteristics: Irregular  Cervical Dilation: 4-5        Cervical Effacement: 60  Fetal Station: -2  Baseline Rate (FHR): 130 bpm  Fetal Heart Rate (FHT): 155 BPM  FHR Category: 1               Vital Signs:   Vitals:    12/01/24 1248   BP: 129/71   Pulse: 88   Resp:    Temp:        Notes/comments:   Comfortable after epidural. Making good progress. Cont with pitocin      Zeyad Wong DO 12/1/2024 1:58 PM

## 2024-12-01 NOTE — PROGRESS NOTES
Patient complained of pressure on chest, Dr Gabriel notified, Dr Ana Beck notified vial signs stable as charted. Patient has history of panic attacks and has had this symptom in the past.

## 2024-12-01 NOTE — OB LABOR/OXYTOCIN SAFETY PROGRESS
Labor Progress Note - China Sctot 34 y.o. female MRN: [id number]    Unit/Bed#: -01 Encounter: [id number]    Dose (fredis-units/min) Oxytocin: 2 fredis-units/min  Contraction Frequency (minutes): 2-5  Contraction Intensity: Moderate  Uterine Activity Characteristics: Irregular  Cervical Dilation: 4-5        Cervical Effacement: 60  Fetal Station: -2  Baseline Rate (FHR): 125 bpm  Fetal Heart Rate (FHT): 155 BPM  FHR Category: 1               Vital Signs:   Vitals:    12/01/24 1521   BP:    Pulse:    Resp:    Temp: 98 °F (36.7 °C)   SpO2:        Notes/comments:   Exam deferred. Cont with augmentation      Zeyad Wong DO 12/1/2024 4:03 PM

## 2024-12-01 NOTE — OB LABOR/OXYTOCIN SAFETY PROGRESS
Labor Progress Note - China Scott 34 y.o. female MRN: [id number]    Unit/Bed#: -01 Encounter: [id number]    Dose (fredis-units/min) Oxytocin: 6 fredis-units/min  Contraction Frequency (minutes): 2-4  Contraction Intensity: Moderate  Uterine Activity Characteristics: Irregular  Cervical Dilation: 4-5        Cervical Effacement: 60  Fetal Station: -2  Baseline Rate (FHR): 130 bpm  Fetal Heart Rate (FHT): 155 BPM  FHR Category: 1               Vital Signs:   Vitals:    12/01/24 1640   BP: 115/59   Pulse: 87   Resp:    Temp:    SpO2:        Notes/comments:   Cervix unchanged, will go up pm pitocin more aggressively.       Zeyad Wong DO 12/1/2024 5:02 PM

## 2024-12-01 NOTE — ANESTHESIA PREPROCEDURE EVALUATION
Procedure:  LABOR ANALGESIA    Relevant Problems   ANESTHESIA (within normal limits)      ENDO   (+) Hypothyroidism complicating pregnancy, third trimester      HEMATOLOGY   (+) Iron deficiency anemia secondary to inadequate dietary iron intake      NEURO/PSYCH   (+) Depression affecting pregnancy in third trimester, antepartum   (+) Numbness and tingling     Labs:   Results from last 7 days   Lab Units 12/01/24  1212 11/28/24  1101   WBC Thousand/uL 19.07* 15.18*   HEMOGLOBIN g/dL 11.4* 10.1*   HEMATOCRIT % 36.1 33.1*   PLATELETS Thousands/uL 464* 345   SEGS PCT % 72 63   MONO PCT % 9 9   EOS PCT % 1 2     Results from last 7 days   Lab Units 11/28/24  1101   SODIUM mmol/L 133*   POTASSIUM mmol/L 3.8   CHLORIDE mmol/L 105   CO2 mmol/L 20*   ANION GAP mmol/L 8   BUN mg/dL 7   CREATININE mg/dL 0.55*   EGFR ml/min/1.73sq m 122   CALCIUM mg/dL 8.0*   GLUCOSE RANDOM mg/dL 78   ALT U/L 9   AST U/L 25   ALK PHOS U/L 137*   ALBUMIN g/dL 3.1*   TOTAL BILIRUBIN mg/dL 0.34       Physical Exam    Airway    Mallampati score: I  TM Distance: >3 FB  Neck ROM: full     Dental   No notable dental hx     Cardiovascular  Cardiovascular exam normal    Pulmonary  Pulmonary exam normal     Other Findings  post-pubertal.      Anesthesia Plan  ASA Score- 2     Anesthesia Type- epidural with ASA Monitors.         Additional Monitors:     Airway Plan:     Comment: Patient is requesting epidural for labor.  Patient seen and examined.  The risks/benefits of Epidural anesthesia discussed including risk of PDPH, partial or failed block, and rare emergencies including infection, nerve injury and total spinal anesthesia.  Anesthetic plan for potential c/s in event of emergency reviewed with patient.  .       Plan Factors-    Chart reviewed.   Existing labs reviewed. Patient summary reviewed.                  Induction-     Postoperative Plan-     Perioperative Resuscitation Plan - Level 1 - Full Code.       Informed Consent- Anesthetic plan and  risks discussed with patient.

## 2024-12-01 NOTE — TELEPHONE ENCOUNTER
"Reason for Disposition   Leakage of fluid from vagina  (Exception: Patient is uncertain, but thinks it might be urine incontinence.)    Answer Assessment - Initial Assessment Questions  1. ONSET: \"When did you notice the fluid coming out of your vagina?\"         12/1    2. CONTRACTIONS: \"Are you having any contractions?\" If Yes, ask: \"Describe the contractions that you are having.\" (e.g., duration, frequency, regularity, severity)      Denies    3. MARIALUISA: \"What date are you expecting to deliver?\"      12/30/24    4. PARITY: \"Have you had a baby before?\" If Yes, ask: \"How long did the labor last?\"      Yes    5. FETAL MOVEMENT: \"Has the baby's movement decreased or changed significantly from normal?\"      Good FM    6. OTHER SYMPTOMS: \"Do you have any other symptoms?\" (e.g., abdomen pain, fever, hand or face swelling, vaginal bleeding)      No    Protocols used: Pregnancy - Rupture of Membranes Suspected-Adult-AH    "

## 2024-12-01 NOTE — ANESTHESIA PROCEDURE NOTES
Epidural Block    Patient location during procedure: OB/L&D  Start time: 12/1/2024 1:38 PM  Reason for block: at surgeon's request and primary anesthetic  Staffing  Performed by: Damir Gabriel MD  Authorized by: Damir Gabriel MD    Preanesthetic Checklist  Completed: patient identified, IV checked, risks and benefits discussed, surgical consent, monitors and equipment checked, pre-op evaluation and timeout performed  Epidural  Patient position: sitting  Prep: ChloraPrep  Sedation Level: no sedation  Patient monitoring: frequent blood pressure checks and continuous pulse ox  Approach: midline  Location: lumbar,  Injection technique: MARIA ELENA air  Needle  Needle type: Tuohy   Needle gauge: 17 G  Needle insertion depth: 6 cm  Catheter type: end hole  Catheter size: 19 G  Catheter at skin depth: 11 cm  Catheter securement method: stabilization device  Test dose: negative  Assessment  Number of attempts: 1negative aspiration for CSF, negative aspiration for heme and no paresthesia on injection  patient tolerated the procedure well with no immediate complications

## 2024-12-01 NOTE — OB LABOR/OXYTOCIN SAFETY PROGRESS
Oxytocin Safety Progress Check Note - China Scott 34 y.o. female MRN: [id number]    Unit/Bed#: -01 Encounter: [id number]       Contraction Frequency (minutes): irregular  Contraction Intensity: Mild  Uterine Activity Characteristics: Irregular  Cervical Dilation: 3        Cervical Effacement: 60  Fetal Station: -2  Baseline Rate (FHR): 130 bpm  Fetal Heart Rate (FHT): 155 BPM  FHR Category: 1               Vital Signs:   Vitals:    12/01/24 0853   BP: 127/79   Pulse: 88   Resp: 18   Temp: 98 °F (36.7 °C)       Notes/comments:         Yudi Betancourt DO 12/1/2024 11:54 AM

## 2024-12-01 NOTE — H&P
OB H&P  China Donahuen  1990  LD TRIAGE /LD TRIAGE 1*          34 y.o. yo  at 35 weeks by us and lmp      Assessment:/Plan:     IUP at 35 weeks.   GBS positive  Penicillin asap  PPROM   Amnisure +, nitrazine +   Pitocin induction  Hypothyroid   Continue replacement  Hx PreE with SF   BP has been good this pregnancy, no signs preE now  Anemia   Oral replacement      Chief complaint:  SROM 730 AM    HPI:  Contractions:  yes  Fetal movement:  yes  Vaginal bleeding:   no  Leaking of fluid:  yes      Pregnancy Complications:  Patient Active Problem List   Diagnosis    Hypothyroidism complicating pregnancy, third trimester    34 weeks gestation of pregnancy    Depression affecting pregnancy in third trimester, antepartum    History of severe pre-eclampsia    Transfer @ 18 weeks    Iron deficiency anemia secondary to inadequate dietary iron intake    Numbness and tingling     uterine contractions, antepartum, third trimester    GBS bacteriuria     labor in third trimester    36 weeks gestation of pregnancy    Abdominal pain affecting pregnancy         Past Medical History:  Past Medical History:   Diagnosis Date    Anxiety     Cholelithiasis with acute cholecystitis 2024    History of cold sores     History of immune thrombocytopenia     Hydronephrosis of right kidney 2022    Migraines     without Aura    TB lung, latent 2011    Thyroid disease     Followed by PCP       Past Surgical History:  Past Surgical History:   Procedure Laterality Date    CHOLECYSTECTOMY      CHOLECYSTECTOMY LAPAROSCOPIC  2024    CYSTOSCOPY W/ URETERAL STENT PLACEMENT Right 2022    Removed 22    DILATION AND EVACUATION  08/10/2021    MAB    SPLENECTOMY  2010       Social Hx:  Social History     Socioeconomic History    Marital status: /Civil Union     Spouse name: Not on file    Number of children: Not on file    Years of education: Not on file    Highest education level: Not  on file   Occupational History    Not on file   Tobacco Use    Smoking status: Never     Passive exposure: Never    Smokeless tobacco: Never   Vaping Use    Vaping status: Never Used   Substance and Sexual Activity    Alcohol use: Not Currently    Drug use: Not Currently     Types: Marijuana     Comment: History of marijuana in college    Sexual activity: Yes     Partners: Male     Birth control/protection: None   Other Topics Concern    Not on file   Social History Narrative    Not on file     Social Drivers of Health     Financial Resource Strain: Not At Risk (6/29/2023)    Received from [facility]    Financial Resource Strain     In the last 12 months did you skip medications to save money?: No     In the last 12 months, was there a time when you needed to see a doctor but could not because of cost?: No   Food Insecurity: No Food Insecurity (11/30/2024)    Nursing - Inadequate Food Risk Classification     Worried About Running Out of Food in the Last Year: Never true     Ran Out of Food in the Last Year: Never true     Ran Out of Food in the Last Year: 1   Transportation Needs: No Transportation Needs (11/30/2024)    Nursing - Transportation Risk Classification     Lack of Transportation: Not on file     Lack of Transportation: 2   Physical Activity: Not on file   Stress: Not on file   Social Connections: Not At Risk (6/29/2023)    Received from [facility]    Social Connections     Do you often feel lonely?: No   Intimate Partner Violence: Unknown (11/30/2024)    Nursing IPS     Feels Physically and Emotionally Safe: Not on file     Physically Hurt by Someone: Not on file     Humiliated or Emotionally Abused by Someone: Not on file     Physically Hurt by Someone: 2     Hurt or Threatened by Someone: 2   Housing Stability: Unknown (11/30/2024)    Nursing: Inadequate Housing Risk Classification     Has Housing: Not on file     Worried About Losing Housing: Not on  "file     Unable to Get Utilities: Not on file     Unable to Pay for Housing in the Last Year: 2     Has Housin       Meds:  No current facility-administered medications on file prior to encounter.     Current Outpatient Medications on File Prior to Encounter   Medication Sig Dispense Refill    aspirin 81 mg chewable tablet Chew 162 mg daily      famotidine (PEPCID) 20 mg tablet Take 20 mg by mouth 2 (two) times a day      Ferrous Sulfate ER (Slow Fe) 45 MG TBCR Take by mouth      FLUoxetine (PROzac) 10 mg capsule Take 1 capsule (10 mg total) by mouth daily 30 capsule 6    levothyroxine 75 mcg tablet Take 75 mcg by mouth daily      Prenatal Vit-Fe Sulfate-FA-DHA (Prenatal Vitamin/Min +DHA) 27-0.8-200 MG CAPS Take by mouth      vitamin B-12 (VITAMIN B-12) 1,000 mcg tablet Take 1 tablet (1,000 mcg total) by mouth daily 30 tablet 1       Allergies:  Allergies   Allergen Reactions    Pollen Extract Other (See Comments)     Itchy eyes, runny nose       RoS/    Constitutional: Negative    CV: Negative    Pulm: Negative    GI: Negative    Urinary: Negative    Neuro: Negative    Musculoskeletal: Negative    Obstetric History:    G 3 P 1011  1  with preE with SF    Labs:  Strep Grp B PCR   Date Value Ref Range Status   2024 Positive (A) Negative Final     Comment:           Type & Screen:  ABO Grouping   Date Value Ref Range Status   2024 B  Final      Rh Factor   Date Value Ref Range Status   2024 Positive  Final    No results found for: \"ANTIBODYSCR\"    Specimen Expiration Date   Date Value Ref Range Status   [id number]  Final     HIV-1/HIV-2 AB   Date Value Ref Range Status   2024 Non-Reactive  Final     Hepatitis B Surface Ag   Date Value Ref Range Status   10/21/2024 Non-reactive Non-Reactive Final     RPR   Date Value Ref Range Status   10/09/2024 Non Reactive Non Reactive Final     No results found for: \"RUBELLAIGGQT\"  Glucose   Date Value Ref Range Status   10/09/2024 93 70 - " 139 mg/dL Final     Comment:     According to ADA, a glucose threshold of >139 mg/dL after 50-gram  load identifies approximately 80% of women with gestational  diabetes mellitus, while the sensitivity is further increased to  approximately 90% by a threshold of >129 mg/dL.               Physical Exam:      Vital signs: 127/79  88   16  a febrile     Alert, comfortable, no acute distress      Neuro:        Alert, appropriate affect, no gross deficits        Normal speech        CN2-12 intact and symmetric        DTR 3+ and symmetric        Muscle strength 5/5 and symmetric    Regular rate and rhythm    Clear to auscultation bilaterally    Abdomen soft, nontender, nondistended.    Fundus nontender, size consistent with dates      Cephalic  Cervix: 3 cm 60% -2  FHR: cat 1  CTXN: irreg  Membranes: srom clear  Pelvis adequate  EFW 6 lb

## 2024-12-01 NOTE — OB LABOR/OXYTOCIN SAFETY PROGRESS
Oxytocin Safety Progress Check Note - China Scott 34 y.o. female MRN: [id number]    Unit/Bed#: -01 Encounter: [id number]    Dose (fredis-units/min) Oxytocin: 8 fredis-units/min  Contraction Frequency (minutes): 2-4  Contraction Intensity: Moderate  Uterine Activity Characteristics: Irregular  Cervical Dilation: 8        Cervical Effacement: 90  Fetal Station: -2  Baseline Rate (FHR): 135 bpm  Fetal Heart Rate (FHT): 155 BPM  FHR Category: 1               Vital Signs:   Vitals:    12/01/24 1819   BP:    Pulse:    Resp:    Temp: 97.8 °F (36.6 °C)   SpO2:        Notes/comments:       Making progress  Uncomfortable, anesthesia to see patient    Yudi Betancourt DO 12/1/2024 6:36 PM

## 2024-12-02 LAB
BASOPHILS # BLD MANUAL: 0 THOUSAND/UL (ref 0–0.1)
BASOPHILS NFR MAR MANUAL: 0 % (ref 0–1)
EOSINOPHIL # BLD MANUAL: 0.75 THOUSAND/UL (ref 0–0.4)
EOSINOPHIL NFR BLD MANUAL: 3 % (ref 0–6)
ERYTHROCYTE [DISTWIDTH] IN BLOOD BY AUTOMATED COUNT: 13.8 % (ref 11.6–15.1)
HCT VFR BLD AUTO: 32.2 % (ref 34.8–46.1)
HGB BLD-MCNC: 10.1 G/DL (ref 11.5–15.4)
LYMPHOCYTES # BLD AUTO: 20 % (ref 14–44)
LYMPHOCYTES # BLD AUTO: 4.97 THOUSAND/UL (ref 0.6–4.47)
MCH RBC QN AUTO: 28.1 PG (ref 26.8–34.3)
MCHC RBC AUTO-ENTMCNC: 31.4 G/DL (ref 31.4–37.4)
MCV RBC AUTO: 90 FL (ref 82–98)
MONOCYTES # BLD AUTO: 1.24 THOUSAND/UL (ref 0–1.22)
MONOCYTES NFR BLD: 5 % (ref 4–12)
NEUTROPHILS # BLD MANUAL: 17.9 THOUSAND/UL (ref 1.85–7.62)
NEUTS SEG NFR BLD AUTO: 72 % (ref 43–75)
PLATELET # BLD AUTO: 448 THOUSANDS/UL (ref 149–390)
PLATELET BLD QL SMEAR: ABNORMAL
PMV BLD AUTO: 10.4 FL (ref 8.9–12.7)
POLYCHROMASIA BLD QL SMEAR: PRESENT
RBC # BLD AUTO: 3.59 MILLION/UL (ref 3.81–5.12)
RBC MORPH BLD: PRESENT
TREPONEMA PALLIDUM IGG+IGM AB [PRESENCE] IN SERUM OR PLASMA BY IMMUNOASSAY: NORMAL
WBC # BLD AUTO: 24.86 THOUSAND/UL (ref 4.31–10.16)

## 2024-12-02 PROCEDURE — 86780 TREPONEMA PALLIDUM: CPT | Performed by: OBSTETRICS & GYNECOLOGY

## 2024-12-02 PROCEDURE — 99024 POSTOP FOLLOW-UP VISIT: CPT | Performed by: OBSTETRICS & GYNECOLOGY

## 2024-12-02 PROCEDURE — 85007 BL SMEAR W/DIFF WBC COUNT: CPT | Performed by: OBSTETRICS & GYNECOLOGY

## 2024-12-02 PROCEDURE — 85027 COMPLETE CBC AUTOMATED: CPT | Performed by: OBSTETRICS & GYNECOLOGY

## 2024-12-02 RX ADMIN — IBUPROFEN 600 MG: 600 TABLET, FILM COATED ORAL at 00:09

## 2024-12-02 RX ADMIN — IBUPROFEN 600 MG: 600 TABLET, FILM COATED ORAL at 17:30

## 2024-12-02 RX ADMIN — BENZOCAINE AND LEVOMENTHOL 1 APPLICATION: 200; 5 SPRAY TOPICAL at 20:34

## 2024-12-02 RX ADMIN — DOCUSATE SODIUM 100 MG: 100 CAPSULE, LIQUID FILLED ORAL at 17:30

## 2024-12-02 RX ADMIN — WITCH HAZEL 1 PAD: 500 SOLUTION RECTAL; TOPICAL at 20:34

## 2024-12-02 RX ADMIN — ACETAMINOPHEN 650 MG: 325 TABLET, FILM COATED ORAL at 08:51

## 2024-12-02 RX ADMIN — FLUOXETINE HYDROCHLORIDE 10 MG: 10 CAPSULE ORAL at 08:19

## 2024-12-02 RX ADMIN — CALCIUM CARBONATE (ANTACID) CHEW TAB 500 MG 1000 MG: 500 CHEW TAB at 11:07

## 2024-12-02 RX ADMIN — FERROUS SULFATE TAB 325 MG (65 MG ELEMENTAL FE) 325 MG: 325 (65 FE) TAB at 08:20

## 2024-12-02 RX ADMIN — IBUPROFEN 600 MG: 600 TABLET, FILM COATED ORAL at 05:48

## 2024-12-02 RX ADMIN — ACETAMINOPHEN 650 MG: 325 TABLET, FILM COATED ORAL at 02:11

## 2024-12-02 RX ADMIN — IBUPROFEN 600 MG: 600 TABLET, FILM COATED ORAL at 11:59

## 2024-12-02 RX ADMIN — DOCUSATE SODIUM 100 MG: 100 CAPSULE, LIQUID FILLED ORAL at 08:18

## 2024-12-02 RX ADMIN — LEVOTHYROXINE SODIUM 75 MCG: 75 TABLET ORAL at 05:48

## 2024-12-02 RX ADMIN — PRENATAL VIT W/ FE FUMARATE-FA TAB 27-0.8 MG 1 TABLET: 27-0.8 TAB at 08:19

## 2024-12-02 NOTE — ANESTHESIA POSTPROCEDURE EVALUATION
Post-Op Assessment Note    CV Status:  Stable  Pain Score: 0    Pain management: adequate      Post-op block assessment: no complications   Mental Status:  Alert and awake   Hydration Status:  Euvolemic and stable   PONV Controlled:  None   Airway Patency:  Patent     Post Op Vitals Reviewed: Yes    No anethesia notable event occurred.    Staff: CRNA       Last Filed PACU Vitals:  Vitals Value Taken Time   Temp     Pulse 82 12/01/24 2051   /71 12/01/24 2051   Resp     SpO2     Vitals shown include unfiled device data.    Modified Nazario:  No data recorded

## 2024-12-02 NOTE — L&D DELIVERY NOTE
"DELIVERY NOTE  China Scott 34 y.o. female MRN: [id number]  Unit/Bed#: -01 Encounter: [id number]    Obstetrician:  Zeyad Wong DO    Pre-Delivery Diagnosis: Allen pregnancy in vertex presentation at 35w6d gestation.  PPROM  Hypothyroid in pregnancy  Anemia  GBS+    Post-Delivery Diagnosis: Same, delivered    Procedure: Spontaneous vaginal delivery    Delivery Date and Time:  12/1/24 1937    Umbilical Artery  Recent Labs     12/01/24 1939   PHCART 7.345   BECART -2.0*       Umbilical Vein  No results for input(s): \"PHCVEN\", \"BECVEN\" in the last 72 hours.    Apgars: Pending    Weight: 6#13oz           Complications: None    Description of Procedure:  Patient was found to be completely dilated and +3 station. She pushed for 6 minutes to spontaneously deliver a liveborn infant in OA position through clear fluid at 1937. The head and shoulders delivered easily, with the body easily delivering thereafter. The infant was placed on maternal abdomen. Cord clamping was delayed for 60 seconds, after which the cord was doubly clamped and cut. Routine cord gases and cord blood were collected. Pitocin was started for active management of the 3rd stage. Placenta delivered spontaneously and was noted to have a centrally-inserted 3-vessel cord. The placenta was sent to pathology. Bimanual exam was performed, and the fundus was noted to be firm. A thorough pelvic exam revealed no laceration. Excellent hemostasis was noted, with total QBL of 100mL. All needle, sponge, and instrument counts were noted to be correct. The patient tolerated the procedure well and was allowed to recover in labor and delivery room.     Zeyad Wong DO  12/1/2024 7:46 PM      "

## 2024-12-02 NOTE — PROGRESS NOTES
Progress Note - OB/GYN   Name: China Scott 34 y.o. female I MRN: [id number]  Unit/Bed#: -01 I Date of Admission: 2024   Date of Service: 2024 I Hospital Day: 1    Assessment & Plan  Spontaneous vaginal delivery  Doing well  Continue postpartum care   premature rupture of membranes      OB Post-Partum Progress Note  Subjective   Post delivery. Patient is doing well. Lochia WNL. Pain well controlled.     Pain: yes, cramping, improved with meds  Tolerating PO: yes  Voiding: yes  Ambulating: yes  Breastfeeding:  No  Chest pain: no  Shortness of breath: no  Leg pain: no  Lochia: WNL    Objective :  Temp:  [97.6 °F (36.4 °C)-98.9 °F (37.2 °C)] 97.6 °F (36.4 °C)  HR:  [] 76  BP: (103-143)/(56-97) 138/97  Resp:  [16-19] 18  SpO2:  [96 %-100 %] 99 %  O2 Device: None (Room air)    Physical Exam  Vitals and nursing note reviewed.   Constitutional:       General: She is not in acute distress.     Appearance: She is well-developed.   HENT:      Head: Normocephalic and atraumatic.   Eyes:      Conjunctiva/sclera: Conjunctivae normal.   Pulmonary:      Effort: Pulmonary effort is normal. No respiratory distress.   Abdominal:      Palpations: Abdomen is soft.      Tenderness: There is no abdominal tenderness.      Comments: Fundus firm and 1 below U   Musculoskeletal:         General: No swelling.      Cervical back: Neck supple.   Skin:     General: Skin is warm and dry.      Capillary Refill: Capillary refill takes less than 2 seconds.   Neurological:      Mental Status: She is alert.   Psychiatric:         Mood and Affect: Mood normal.         Lab Results: I have reviewed the following results:  Lab Results   Component Value Date    WBC 24.86 (H) 2024    HGB 10.1 (L) 2024    HCT 32.2 (L) 2024    MCV 90 2024     (H) 2024

## 2024-12-03 VITALS
TEMPERATURE: 97.7 F | SYSTOLIC BLOOD PRESSURE: 116 MMHG | DIASTOLIC BLOOD PRESSURE: 83 MMHG | BODY MASS INDEX: 33.38 KG/M2 | OXYGEN SATURATION: 100 % | HEIGHT: 60 IN | RESPIRATION RATE: 17 BRPM | WEIGHT: 170 LBS | HEART RATE: 61 BPM

## 2024-12-03 RX ORDER — IBUPROFEN 600 MG/1
600 TABLET, FILM COATED ORAL EVERY 6 HOURS PRN
Qty: 90 TABLET | Refills: 0 | Status: SHIPPED | OUTPATIENT
Start: 2024-12-03

## 2024-12-03 RX ADMIN — IBUPROFEN 600 MG: 600 TABLET, FILM COATED ORAL at 08:45

## 2024-12-03 RX ADMIN — DOCUSATE SODIUM 100 MG: 100 CAPSULE, LIQUID FILLED ORAL at 08:45

## 2024-12-03 RX ADMIN — PRENATAL VIT W/ FE FUMARATE-FA TAB 27-0.8 MG 1 TABLET: 27-0.8 TAB at 08:45

## 2024-12-03 RX ADMIN — FERROUS SULFATE TAB 325 MG (65 MG ELEMENTAL FE) 325 MG: 325 (65 FE) TAB at 08:46

## 2024-12-03 RX ADMIN — LEVOTHYROXINE SODIUM 75 MCG: 75 TABLET ORAL at 05:39

## 2024-12-03 RX ADMIN — BENZOCAINE AND LEVOMENTHOL 1 APPLICATION: 200; 5 SPRAY TOPICAL at 08:45

## 2024-12-03 RX ADMIN — IBUPROFEN 600 MG: 600 TABLET, FILM COATED ORAL at 03:27

## 2024-12-03 NOTE — DISCHARGE SUMMARY
ADMISSION  [facility]  Admission Date: 2024   Admitting Attending: Dr. Zeyad VARGAS DO   Admitting Diagnoses:   Patient Active Problem List   Diagnosis    Hypothyroidism complicating pregnancy, third trimester    34 weeks gestation of pregnancy    Depression affecting pregnancy in third trimester, antepartum    History of severe pre-eclampsia    Transfer @ 18 weeks    Iron deficiency anemia secondary to inadequate dietary iron intake    Numbness and tingling     uterine contractions, antepartum, third trimester    GBS bacteriuria     labor in third trimester    36 weeks gestation of pregnancy    Abdominal pain affecting pregnancy     premature rupture of membranes    Spontaneous vaginal delivery       DELIVERY  Delivery Method: Vaginal, Spontaneous   Delivery Date and Time: 2024 7:37 PM  Delivery Attending: Zeyad Wong     DISCHARGE  Discharge Date: 12/3/24  Discharge Attending: Dr. Zeyad VARGAS DO   Discharge Diagnosis:   Same, Delivered    Clinical course: Admission to Delivery  China Scott is a 34 y.o.  who was admitted at 36w1d for  .     Reason for induction:  .      Pt was in spontaneous labor and managed expectantly.       Delivery  Route of Delivery: Vaginal, Spontaneous       Anesthesia: Epidural,   QBL: Non-Surgical QBL (mL): 100      QBL:        Delivery: Vaginal, Spontaneous at 2024 7:37 PM   Laceration: Perineal: None Repaired?      Baby's Weight: 3100 g (6 lb 13.4 oz); 109.35    Apgar scores: 8  and 9  at 1 and 5 minutes, respectively    Clinical Course: Post-Delivery:  The post delivery course was unremarkable.    On the day of discharge, the patient was ambulating, voiding spontaneously, tolerating oral intake, and hemodynamically stable. She was able to reasonably perform all ADLs. She had appropriate bowel function. Pain was well-controlled. She was discharged home on postpartum/postop day # 2 without  complications . Patient was instructed to follow up with her OB as an outpatient and was given appropriate warnings to call her provider with problems or concerns.    Given her history of preeclampsia, in prior pregnancy, plan for repeat BP check in 1 week.    Pertinent lab findings included:   Blood type B+.     Last three Hgb values:  Lab Results   Component Value Date    HGB 10.1 (L) 12/02/2024    HGB 11.4 (L) 12/01/2024    HGB 10.1 (L) 11/28/2024        Problem-specific follow-up plans included the following:  Assessment & Plan  Spontaneous vaginal delivery  Doing well  Continue postpartum care  Hypothyroidism complicating pregnancy, third trimester  - continue home dose levothyroxine  History of severe pre-eclampsia  - no signs or symptoms of PEC now  - patient good for discharge home today  - Will need BP check in the office within the week given her history  Depression affecting pregnancy in third trimester, antepartum  - continue home dose Prozac    Discharge med list:  Contraception:       Medication List      START taking these medications     ibuprofen 600 mg tablet; Commonly known as: MOTRIN; Take 1 tablet (600   mg total) by mouth every 6 (six) hours as needed for moderate pain     CONTINUE taking these medications     FLUoxetine 10 mg capsule; Commonly known as: PROzac; Take 1 capsule (10   mg total) by mouth daily   levothyroxine 75 mcg tablet   Prenatal Vitamin/Min +DHA 27-0.8-200 MG Caps     STOP taking these medications     famotidine 20 mg tablet; Commonly known as: PEPCID   Slow Fe 45 MG Tbcr; Generic drug: Ferrous Sulfate ER       Condition at discharge:   good     Disposition:   Home    Planned Readmission:   No    Discharge Statement:  I have spent a total time of 15 minutes in caring for this patient on the day of the visit/encounter. >30 minutes of time was spent on: Impressions, Counseling / Coordination of care, Documenting in the medical record, Reviewing / ordering tests, medicine,  procedures  , and Communicating with other healthcare professionals .

## 2024-12-03 NOTE — ASSESSMENT & PLAN NOTE
- no signs or symptoms of PEC now  - patient good for discharge home today  - Will need BP check in the office within the week given her history

## 2024-12-03 NOTE — PROGRESS NOTES
Progress Note - OB/GYN  Post-Partum Physician Note   China Scott 34 y.o. female MRN: [id number]  Unit/Bed#:  206-01 Encounter: [id number]    Assessment:  34 y.o. year-old , postpartum day #2 s/p , doing well.    Assessment & Plan  Spontaneous vaginal delivery  Doing well  Continue postpartum care  History of severe pre-eclampsia  - no signs or symptoms of PEC now  - patient good for discharge home today  - Will need BP check in the office within the week given her history  Hypothyroidism complicating pregnancy, third trimester  - continue home dose levothyroxine  Depression affecting pregnancy in third trimester, antepartum  - continue home dose Prozac      _________________________________    Subjective:   No acute events overnight. Denies HA, visual changes, epigastric pain. Denies shortness of breath or chest pain. Ambulating and voiding without difficulty.  Good  urine output. Minimal lochia.      Objective:   Vitals:    24 1100 24 1500 24 2259 24 0737   BP: 126/77 131/87 136/63 116/83   BP Location: Right arm Right arm Left arm Right arm   Pulse: 67 68 70 61   Resp: 18 18 18 17   Temp: 97.5 °F (36.4 °C) 98 °F (36.7 °C) 98.2 °F (36.8 °C) 97.7 °F (36.5 °C)   TempSrc: Temporal Temporal Temporal Temporal   SpO2: 98% 99% 97% 100%   Weight:       Height:         No intake or output data in the 24 hours ending 24 0758    Physical Exam:  General: AOx3, NAD  Lungs: CTAB  CV: RRR  Abdomen: soft, fundus firm below umbilicus, nontender  Extremities: nontender without edema bilaterally      Labs/Tests:   Lab Results   Component Value Date/Time    HGB 10.1 (L) 2024 05:53 AM    HGB 11.4 (L) 2024 12:12 PM     (H) 2024 05:53 AM     (H) 2024 12:12 PM    WBC 24.86 (H) 2024 05:53 AM    WBC 19.07 (H) 2024 12:12 PM        Brief OB Lab review:  ABO Grouping   Date Value Ref Range Status   2024 B  Final      Rh Factor   Date Value Ref  "Range Status   12/01/2024 Positive  Final    No results found for: \"ANTIBODYSCR\"  No results found for: \"RUBM\"    MEDS:     Current Facility-Administered Medications:     acetaminophen (TYLENOL) tablet 650 mg, Q4H PRN    benzocaine-menthol-lanolin-aloe (DERMOPLAST) 20-0.5 % topical spray 1 Application, Q6H PRN    calcium carbonate (TUMS) chewable tablet 1,000 mg, Daily PRN    diphenhydrAMINE (BENADRYL) injection 25 mg, Q6H PRN    docusate sodium (COLACE) capsule 100 mg, BID    ferrous sulfate tablet 325 mg, Daily With Breakfast    FLUoxetine (PROzac) capsule 10 mg, Daily    hydrocortisone 1 % cream 1 Application, Daily PRN    ibuprofen (MOTRIN) tablet 600 mg, Q6H PRN    levothyroxine tablet 75 mcg, Early Morning    ondansetron (ZOFRAN) injection 4 mg, Q8H PRN    oxytocin (PITOCIN) 30 Units in lactated ringers 500 mL infusion, Once    prenatal multivitamin tablet 1 tablet, Daily    witch hazel-glycerin (TUCKS) topical pad 1 Pad, Q4H PRN  Invasive Devices       Peripheral Intravenous Line  Duration             Peripheral IV 12/01/24 Right;Ventral (anterior) Forearm 1 day                      Deborah Flynn MD  12/3/2024 7:58 AM             "

## 2024-12-03 NOTE — PLAN OF CARE
Problem: Knowledge Deficit  Goal: Verbalizes understanding of labor plan  Description: Assess patient/family/caregiver's baseline knowledge level and ability to understand information.  Provide education via patient/family/caregiver's preferred learning method at appropriate level of understanding.     1. Provide teaching at level of understanding.  2. Provide teaching via preferred learning method(s).  Outcome: Progressing     Problem: Labor & Delivery  Goal: Manages discomfort  Description: Assess and monitor for signs and symptoms of discomfort.  Assess patient's pain level regularly and per hospital policy.  Administer medications as ordered. Support use of nonpharmacological methods to help control pain such as distraction, imagery, relaxation, and application of heat and cold.  Collaborate with interdisciplinary team and patient to determine appropriate pain management plan.    1. Include patient in decisions related to comfort.  2. Offer non-pharmacological pain management interventions.  3. Report ineffective pain management to physician.  Outcome: Progressing  Goal: Patient vital signs are stable  Description: 1. Assess vital signs - vaginal delivery.  Outcome: Progressing     
  Problem: POSTPARTUM  Goal: Experiences normal postpartum course  Description: INTERVENTIONS:  - Monitor maternal vital signs  - Assess uterine involution and lochia  Outcome: Completed     
  Problem: POSTPARTUM  Goal: Experiences normal postpartum course  Description: INTERVENTIONS:  - Monitor maternal vital signs  - Assess uterine involution and lochia  Outcome: Progressing  Goal: Appropriate maternal -  bonding  Description: INTERVENTIONS:  - Identify family support  - Assess for appropriate maternal/infant bonding   -Encourage maternal/infant bonding opportunities  - Referral to  or  as needed  Outcome: Progressing  Goal: Establishment of infant feeding pattern  Description: INTERVENTIONS:  - Assess breast/bottle feeding  - Refer to lactation as needed  Outcome: Progressing    Outcome: Progressing     
  Problem: POSTPARTUM  Goal: Experiences normal postpartum course  Description: INTERVENTIONS:  - Monitor maternal vital signs  - Assess uterine involution and lochia  Outcome: Progressing  Goal: Appropriate maternal -  bonding  Description: INTERVENTIONS:  - Identify family support  - Assess for appropriate maternal/infant bonding   -Encourage maternal/infant bonding opportunities  - Referral to  or  as needed  Outcome: Progressing  Goal: Establishment of infant feeding pattern  Description: INTERVENTIONS:  - Assess breast/bottle feeding  - Refer to lactation as needed  Outcome: Progressing  Goal: Incision(s), wounds(s) or drain site(s) healing without S/S of infection  Description: INTERVENTIONS  - Assess and document dressing, incision, wound bed, drain sites and surrounding tissue  - Provide patient and family education    Outcome: Progressing    
1.71

## 2024-12-03 NOTE — DISCHARGE INSTRUCTIONS
"Patient Education     Preeclampsia   The Basics   Written by the doctors and editors at [facility]   What is preeclampsia? -- This is a dangerous condition some people get when they are pregnant. It usually happens during the second half of pregnancy (after 20 weeks). It can also happen during labor or soon after the baby is born.  People with preeclampsia have high blood pressure. They might also have too much protein in their urine, or problems with organs like the liver, kidneys, brain, eyes, or placenta. (The placenta is the organ that brings the baby nutrients and oxygen and carries away waste.) Plus, the baby might not grow well and be smaller than normal.  What are the symptoms of preeclampsia? -- Most people do not feel any different than usual. Preeclampsia usually does not cause symptoms unless it is severe. Signs and symptoms of severe preeclampsia include:   Bad headache   Changes in vision, like blurry vision, flashes of light, or spots   Belly pain, especially in the upper belly  If you have any of these symptoms, tell your doctor or nurse. You might not have preeclampsia, because these symptoms can also happen in normal pregnancies. But it's important that your doctor knows about them.  How might preeclampsia affect my baby? -- Preeclampsia can:   Slow the growth of the baby   Decrease the amount of amniotic fluid around the baby (this is the liquid that surrounds and protects the baby in the uterus) (figure 1)  Is there a test for preeclampsia? -- Yes. To test for preeclampsia, your doctor or nurse will:   Take your blood pressure   Check your urine for protein during pregnancy  They will also do blood tests to make sure that your organs are working as they should.  When your doctor or nurse tells you your blood pressure, they will say 2 numbers. For instance, your doctor or nurse might say that your blood pressure is \"140 over 90.\" To be diagnosed with preeclampsia, your top number must be 140 or " "higher, or your bottom number must be 90 or higher. Plus, you must have too much protein in your urine or certain problems with 1 or more of your organs.  It is possible to have high blood pressure (above 140/90) during pregnancy without having high protein in the urine or other problems. That is not preeclampsia. Still, if you develop high blood pressure, your doctor will watch you closely. You could develop preeclampsia or other problems related to high blood pressure.  How is preeclampsia treated? -- For preeclampsia that develops during pregnancy, the only cure is to give birth. Your doctor or nurse will talk with you about whether it is better for you to have your baby right away, or to wait. The best decision depends on how severe your preeclampsia is and how many weeks pregnant you are. While giving birth to your baby will cure your preeclampsia, it's also important to give the baby as much time as possible to grow and develop.  If your preeclampsia is not severe:   If you are less than 34 weeks pregnant, your doctor will probably suggest waiting.   If you are between 34 and 37 weeks pregnant, your doctor will talk to you about your options and help you decide what to do.   If you are more than 37 weeks pregnant, your doctor will probably suggest having your baby.  If the decision is to wait, the doctor will check you and your baby often for any problems. You might need to stay in the hospital until it is time to give birth.  When it is time to have your baby, you will probably get medicine to start contractions. This is called \"inducing labor.\" Most people can have a vaginal birth. But in some cases, the doctor will need to do surgery used to get the baby out of the uterus. This is called \" birth.\"  If your preeclampsia is severe, you will probably need to have your baby as soon as possible. You will also get medicine to lower your blood pressure, if it is very high. This is to keep you from having a " stroke.  Rarely, people with preeclampsia have seizures. Your doctor or nurse might give you medicine during labor to prevent this.  What can I do to prevent preeclampsia? -- You can't do anything to keep from getting preeclampsia. The most important thing you can do is to go to all of the appointments you have with your doctor, nurse, or midwife. That way, they can find out as soon as possible if your blood pressure goes up, or if you have too much protein in your urine or any other problems.  If you are at high risk for preeclampsia, your doctor might tell you to take low-dose aspirin during your second and third trimesters of pregnancy (after 12 weeks). You are at high risk if:   You had preeclampsia before, and your baby was born early.   You are pregnant with twins.   You have high blood pressure even when you are not pregnant.  There are also other conditions that increase your risk. Your doctor can tell you if you are at high risk.  Do not take aspirin or other medicines unless your doctor or nurse tells you that it's safe.  Can preeclampsia cause other health problems? -- If you had preeclampsia, you have a higher chance of getting high blood pressure and heart disease later in life. Tell your primary care doctor or nurse that you had preeclampsia. They can talk to you about how you can lower your chance of getting these health problems in the future. This might include not smoking, eating healthy foods, keeping a healthy weight, and being active.  When should I call the doctor? -- Call your doctor or nurse right away if you have any symptoms of severe preeclampsia, like:   Bad headache   Changes in vision   Belly pain   New shortness of breath  You should also call if you are pregnant and:   Have any bleeding from your vagina   Notice your baby moving less than usual   Think that you might be in labor  All topics are updated as new evidence becomes available and our peer review process is complete.  This  topic retrieved from [employer] on: Mar 28, 2024.  Topic 14871 Version 15.0  Release: 32.2.4 - C32.86  © 2024 UpToDate, Inc. and/or its affiliates. All rights reserved.  figure 1: Pregnancy terms     This drawing shows a baby inside the uterus late in pregnancy.  Graphic 38816 Version 7.0  Consumer Information Use and Disclaimer   Disclaimer: This generalized information is a limited summary of diagnosis, treatment, and/or medication information. It is not meant to be comprehensive and should be used as a tool to help the user understand and/or assess potential diagnostic and treatment options. It does NOT include all information about conditions, treatments, medications, side effects, or risks that may apply to a specific patient. It is not intended to be medical advice or a substitute for the medical advice, diagnosis, or treatment of a health care provider based on the health care provider's examination and assessment of a patient's specific and unique circumstances. Patients must speak with a health care provider for complete information about their health, medical questions, and treatment options, including any risks or benefits regarding use of medications. This information does not endorse any treatments or medications as safe, effective, or approved for treating a specific patient. UpToDate, Inc. and its affiliates disclaim any warranty or liability relating to this information or the use thereof.The use of this information is governed by the Terms of Use, available at https://www.[employer].com/en/know/clinical-effectiveness-terms. 2024© UpToDate, Inc. and its affiliates and/or licensors. All rights reserved.  Copyright   © 2024 UpToDate, Inc. and/or its affiliates. All rights reserved.  Patient Education     Vaginal Delivery Discharge Instructions   About this topic   A vaginal birth means that your baby was born through the vagina. How fast you get well will depend on many things. One is if you have had  a surgical cut in the area between your vaginal opening and your anus. A cut in this area is an episiotomy. Sometimes you tear naturally during childbirth. This can also affect your healing time. How long your labor was and how much rest you get can also impact your healing. Most of the time, your body takes 6 to 8 weeks to heal from childbirth.     What care is needed at home?   Ask your doctor what you need to do when you go home. Make sure you ask questions if you do not understand what you need to do.  Use a well-fitting bra for support.  Use sanitary pads. Ask your doctor when you may use tampons or douche.  Take a sitz bath 3 to 4 times each day. Sit in 2 to 3 inches (5 to 7.5 cm) of warm water in the tub for 10 to 15 minutes each time. Carefully wipe your bottom afterwards.  Place an ice pack or a bag of frozen peas wrapped in a towel over the painful part. Never put ice right on the skin. Do not leave the ice on more than 10 to 15 minutes at a time.  Take your drugs as ordered.  If breastfeeding, wash your breasts daily with water to keep them clean. Air dry nipples after each feeding.  You may feel sad, weepy, and overwhelmed. Your hormones are changing and you are sleeping less. This can affect your emotions. These feelings are normal. Be patient with yourself. If your sadness lasts more than 2 weeks, contact your doctor. Don’t wait until your postpartum visit.  What follow-up care is needed?   Your doctor may ask you to make visits to the office to check on your progress. Be sure to keep your visits. Ask your doctor if it is okay to be active. Also ask about having sex and birth control.  What drugs may be needed?   Your doctor may give you a drug for pain. Over-the-counter (OTC) pain drugs often work just fine. Ask your doctor what to take if you are breastfeeding.  Will physical activity be limited?   You may have to limit your activity. Talk to your doctor about the right amount of activity for you.  Ask your doctor when you may safely:  Drive  Have sex  Lift heavy objects  Climb stairs  Work out  Do not lift older children. Let older children climb into your lap.  What problems could happen?   Bleeding  Uterine infection  Tear of tissue around the vagina  Blood clots in your legs  Infection in your breasts  Depression  When do I need to call the doctor?   Signs of infection such as a fever of 100.4°F (38°C) or higher, chills, pain with passing urine, or wound that will not heal.  Sudden shortness of breath or a sudden onset of chest pain could be a sign that a blood clot has traveled to your lungs. Go to the ER right away.  Signs of wound infection such as swelling, redness, warmth around the wound; too much pain when touched; yellowish, greenish, or bloody discharge; foul smell coming from the cut site; cut site opens up.  Problems with pain that does not go away, or gets worse.  Swollen, hard, or painful breasts with a fever of 100.4°F or higher.  Feeling very sad or low mood.  Problems with your urine or bowel movements.  Sudden, large amounts of vaginal bleeding.  Helpful tips   Take a nap when your baby sleeps.  Do something that can help you relax like reading books or listening to music.  Make time for you and your partner to be alone and talk.  Make time for you and your partner to enjoy your baby.  Breastfeeding is good for the baby and for you.  Eat a well-balanced diet and drink plenty of fluids.  Teach Back: Helping You Understand   The Teach Back Method helps you understand the information we are giving you. After you talk with the staff, tell them in your own words what you learned. This helps to make sure the staff has described each thing clearly. It also helps to explain things that may have been confusing. Before going home, make sure you can do these:  I can tell you about my procedure.  I can tell you what may help ease my pain.  I can tell you what I will do if I have large amounts of  vaginal bleeding, swollen or painful breasts with a fever, or feel very sad or have a low mood.  Last Reviewed Date   2020-10-13  Consumer Information Use and Disclaimer   This generalized information is a limited summary of diagnosis, treatment, and/or medication information. It is not meant to be comprehensive and should be used as a tool to help the user understand and/or assess potential diagnostic and treatment options. It does NOT include all information about conditions, treatments, medications, side effects, or risks that may apply to a specific patient. It is not intended to be medical advice or a substitute for the medical advice, diagnosis, or treatment of a health care provider based on the health care provider's examination and assessment of a patient’s specific and unique circumstances. Patients must speak with a health care provider for complete information about their health, medical questions, and treatment options, including any risks or benefits regarding use of medications. This information does not endorse any treatments or medications as safe, effective, or approved for treating a specific patient. UpToDate, Inc. and its affiliates disclaim any warranty or liability relating to this information or the use thereof. The use of this information is governed by the Terms of Use, available at https://www.[employer].com/en/know/clinical-effectiveness-terms   Copyright   Copyright © 2024 UpToDate, Inc. and its affiliates and/or licensors. All rights reserved.

## 2024-12-04 PROCEDURE — 88307 TISSUE EXAM BY PATHOLOGIST: CPT | Performed by: PATHOLOGY

## 2024-12-06 ENCOUNTER — TELEPHONE (OUTPATIENT)
Dept: OBGYN CLINIC | Facility: CLINIC | Age: 34
End: 2024-12-06

## 2024-12-06 LAB
ATRIAL RATE: 81 BPM
P AXIS: 32 DEGREES
PR INTERVAL: 152 MS
QRS AXIS: 44 DEGREES
QRSD INTERVAL: 64 MS
QT INTERVAL: 376 MS
QTC INTERVAL: 437 MS
T WAVE AXIS: 21 DEGREES
VENTRICULAR RATE: 81 BPM

## 2024-12-06 PROCEDURE — 93010 ELECTROCARDIOGRAM REPORT: CPT | Performed by: INTERNAL MEDICINE

## 2024-12-06 NOTE — TELEPHONE ENCOUNTER
POSTPARTUM PHONE CALL ASSESSMENT placed, voicemail received, left message to call back     Date of Delivery: 24  Delivering Provider: Dr. Wong  Mode:   Delivery Notes/Complications:   Pt was in spontaneous labor and managed expectantly.    Do you still have bleeding/pain? If so, how much/how severe?    Regular BMs/Urination?   Breastfeeding/Formula/Both?   How are you doing emotionally?    If struggling, obtain a EPDS Score:   Do you have any other questions or concerns for us or your provider?   Have you scheduled the pediatrician appointment with pediatrician?   Do you have a postpartum visit scheduled? yes   Date scheduled: 24-BP check and 25-3wk PP Provider:Dr. Wong on 25

## 2024-12-11 NOTE — TELEPHONE ENCOUNTER
Second attempt POSTPARTUM PHONE CALL ASSESSMENT placed, voicemail received, left message to call back      Date of Delivery: 24  Delivering Provider: Dr. Wong  Mode:   Delivery Notes/Complications:   Pt was in spontaneous labor and managed expectantly.               Do you still have bleeding/pain? If so, how much/how severe?               Regular BMs/Urination?   Breastfeeding/Formula/Both?   How are you doing emotionally?               If struggling, obtain a EPDS Score:   Do you have any other questions or concerns for us or your provider?   Have you scheduled the pediatrician appointment with pediatrician?   Do you have a postpartum visit scheduled? yes              Date scheduled: 24-BP check and 25-3wk PP Provider:Dr. Wong on 25  **Pt did not show for BP check on 24-left a message to call back.

## 2025-01-02 ENCOUNTER — POSTPARTUM VISIT (OUTPATIENT)
Dept: OBGYN CLINIC | Facility: CLINIC | Age: 35
End: 2025-01-02

## 2025-01-02 VITALS
WEIGHT: 147 LBS | HEIGHT: 60 IN | DIASTOLIC BLOOD PRESSURE: 70 MMHG | SYSTOLIC BLOOD PRESSURE: 100 MMHG | BODY MASS INDEX: 28.86 KG/M2

## 2025-01-02 DIAGNOSIS — O99.342 DEPRESSION AFFECTING PREGNANCY IN SECOND TRIMESTER, ANTEPARTUM: ICD-10-CM

## 2025-01-02 DIAGNOSIS — F32.A DEPRESSION AFFECTING PREGNANCY IN SECOND TRIMESTER, ANTEPARTUM: ICD-10-CM

## 2025-01-02 PROCEDURE — 99024 POSTOP FOLLOW-UP VISIT: CPT | Performed by: OBSTETRICS & GYNECOLOGY

## 2025-01-02 RX ORDER — FLUOXETINE 10 MG/1
20 CAPSULE ORAL DAILY
Qty: 30 CAPSULE | Refills: 6 | Status: SHIPPED | OUTPATIENT
Start: 2025-01-02

## 2025-01-02 RX ORDER — CLONAZEPAM 0.5 MG/1
0.5 TABLET ORAL 2 TIMES DAILY
COMMUNITY

## 2025-01-02 NOTE — PROGRESS NOTES
[facility] - [place]  [address] [place] [address]    Assessment/Plan:  China is a 34 y.o. year old  who presents for postpartum visit.    Routine Postpartum Care  Normal postpartum exam  Contraception: IUD Mirena  Depression Screen: 10. Reports prozac is working, however still feels anxiety/depression. Denies SI/HI  Feeding: fomula  Cervical cancer screening Up to Date, next due  (3/2023 was last one).  Follow up in: 3 months for annual exam or as needed.  Assessment & Plan  Postpartum depression  Increased prozac to 20mg daily. Patient reports in the past it made her feel numb. May do every other day of 20mg depending on next follow up. Encouraged to see therapist. Information given on how to obtain one.        Subjective:     CC: Postpartum visit    China Scott is a 34 y.o. y.o. female  who presents for a postpartum visit.     She is 4 weeks postpartum following a  on 24 at 35.6 weeks. Postpartum course has been uncomplicated.     Bleeding no bleeding. Bowel function is normal. Bladder function is normal. Patient is not sexually active.     Postpartum Depression: High Risk (2025)    [place]  Depression Scale     Last EPDS Total Score: 10     Last EPDS Self Harm Result: Never       The following portions of the patient's history were reviewed and updated as appropriate: allergies, current medications, past family history, past medical history, obstetric history, gynecologic history, past social history, past surgical history and problem list.    Objective:  Ht 5' (1.524 m)   Wt 66.7 kg (147 lb)   LMP 2024 (Exact Date)   Breastfeeding No   BMI 28.71 kg/m²   Pregravid Weight/BMI: 55.3 kg (122 lb) (BMI 23.83)  Current Weight: 66.7 kg (147 lb)   Total Weight Gain: 21.8 kg (48 lb)   Pre- Vitals      Flowsheet Row Most Recent Value   Prenatal Assessment    Prenatal Vitals    Weight - Scale 66.7 kg (147 lb)   Urine Albumin/Glucose     Dilation/Effacement/Station    Vaginal Drainage    Edema                General Appearance: alert and oriented, in no acute distress.   Abdomen: Soft, non-tender, non-distended, no masses, no rebound or guarding.  Pelvic:       External genitalia: Normal appearance, no abnormal pigmentation, no lesions or masses. Normal Bartholin's and [place]'s. Obstetric laceration well-healed.       Urinary system: Urethral meatus normal, bladder non-tender.      Vaginal: normal mucosa without prolapse or lesions. Normal-appearing physiologic discharge.  Extremities: Normal range of motion.   Skin: normal, no rash or abnormalities  Neurologic: alert, oriented x3  Psychiatric: Appropriate affect, mood stable, cooperative with exam.        Zeyad Wong DO  1/2/2025 12:34 PM

## 2025-02-20 ENCOUNTER — PROCEDURE VISIT (OUTPATIENT)
Dept: OBGYN CLINIC | Facility: CLINIC | Age: 35
End: 2025-02-20
Payer: COMMERCIAL

## 2025-02-20 VITALS
BODY MASS INDEX: 27.48 KG/M2 | SYSTOLIC BLOOD PRESSURE: 104 MMHG | HEIGHT: 60 IN | DIASTOLIC BLOOD PRESSURE: 72 MMHG | WEIGHT: 140 LBS

## 2025-02-20 DIAGNOSIS — Z30.430 ENCOUNTER FOR IUD INSERTION: Primary | ICD-10-CM

## 2025-02-20 DIAGNOSIS — Z32.02 NEGATIVE PREGNANCY TEST: ICD-10-CM

## 2025-02-20 LAB — SL AMB POCT URINE HCG: NORMAL

## 2025-02-20 PROCEDURE — 58300 INSERT INTRAUTERINE DEVICE: CPT | Performed by: OBSTETRICS & GYNECOLOGY

## 2025-02-20 PROCEDURE — 81025 URINE PREGNANCY TEST: CPT | Performed by: OBSTETRICS & GYNECOLOGY

## 2025-02-20 NOTE — PROGRESS NOTES
IUD Procedure    Date/Time: 2/20/2025 1:45 PM    Performed by: Zeyad VARGAS DO  Authorized by: Zeyad VRAGAS DO    Other Assisting Provider: No    Verbal consent obtained?: Yes    Written consent obtained?: Yes    Risks and benefits: Risks, benefits and alternatives were discussed    Consent given by:  Patient  Time Out:     Time out: Immediately prior to the procedure a time out was called    Patient states understanding of procedure being performed: Yes    Patient's understanding of procedure matches consent: Yes    Procedure consent matches procedure scheduled: Yes    Required items: Required blood products, implants, devices and special equipment available    Patient identity confirmed:  Verbally with patient  Select procedure: IUD insertion    IUD Insertion:     Pelvic exam performed: yes      Negative GC/chlamydia test: no      Negative urine pregnancy test: yes      Cervix cleaned and prepped: yes      Speculum placed in vagina: yes      Tenaculum applied to cervix: yes      IUD inserted with no complications: yes      Strings trimmed: yes      Uterus sounded: yes      Uterus sound depth (cm):  8    IUD type:  1 each Levonorgestrel 20 MCG/DAY  Post-procedure:     Patient tolerated procedure well: yes      Patient will follow up after next period: yes    Insertion Comments:      Pt did not end up taking 20mg of prozac as only 10mg was filled. Will send in new rx of 20mg if patient interested. Will call back if she has side effects. Follow up at annual/string check

## (undated) DEVICE — PACK RFID GENERAL LAPAROSCOPY

## (undated) DEVICE — SANI-SERVE SLUSH DRAPE SUBSTIT

## (undated) DEVICE — PAD GROUND ELECTROSURGICAL W/CORD

## (undated) DEVICE — STERISTRIP 1/2INX4IN

## (undated) DEVICE — COVER TABLE 44X90 ST 22/CS

## (undated) DEVICE — APPLICATOR COTTON TIP 6IN MEDC

## (undated) DEVICE — TUBING SMOKE EVAC PENCIL COATED

## (undated) DEVICE — PAD POSITIONING XL W/ARM PROTECTORS

## (undated) DEVICE — SUTURE POLYSORB 0 VIOLET 1X30 GU-46

## (undated) DEVICE — APPLICATOR CHLORAPREP 26ML ORANGE TINT

## (undated) DEVICE — SYRINGE 10CC CONTROL LL

## (undated) DEVICE — TIPS SCISSOR MICROLINE LAP

## (undated) DEVICE — SLEEVE XCEL 5MM X 100MM LONG

## (undated) DEVICE — NEEDLE DISP HYPO 25GX1-1/2IN

## (undated) DEVICE — TROCAR XCEL BLADELESS 5MM X 100MM LONG

## (undated) DEVICE — TUBING INSUFFLATION PNEUMOSURE HEATED HIGH FLOW

## (undated) DEVICE — TOWEL SURGICAL W17XL27IN BLUE COTTON STANDARD PREWASHED DELI

## (undated) DEVICE — SOLN IRRIG STER WATER 1000ML

## (undated) DEVICE — TROCAR HASSON 12MM

## (undated) DEVICE — MANIFOLD SINGLE PORT NEPTUNE

## (undated) DEVICE — CORD LAPAROSCOPIC DISP STERILE

## (undated) DEVICE — DEFOGGER ENDOMATE

## (undated) DEVICE — POUCH STERI DRAPE INSTRUMENT LONG

## (undated) DEVICE — EVACUATOR PLUME-AWAY LAP SMOKE PKG

## (undated) DEVICE — SUTURE POLYSORB 4-0 UNDYED 1X30 P-12

## (undated) DEVICE — SYRINGE BULB IRRIGATION

## (undated) DEVICE — APPLIER ENDO CLIP III 5MM

## (undated) DEVICE — STRYKEFLOW 2 W/ DISP TIP

## (undated) DEVICE — SUTURE MAXON 0 GREEN 1X30 GS-22

## (undated) DEVICE — TUBING PLUM PORT ACTIVE SMOKE EVAC

## (undated) DEVICE — SPECIMN RETV DVC CATCH10 RELIACATCH 10MM

## (undated) DEVICE — GLOVE SURG PROTEXIS PF 7.0 2D72NS70X

## (undated) DEVICE — GLOVE SZ 7 LINER PROTEXIS PI BL